# Patient Record
Sex: FEMALE | Race: ASIAN | NOT HISPANIC OR LATINO | URBAN - METROPOLITAN AREA
[De-identification: names, ages, dates, MRNs, and addresses within clinical notes are randomized per-mention and may not be internally consistent; named-entity substitution may affect disease eponyms.]

---

## 2021-01-01 ENCOUNTER — INPATIENT (INPATIENT)
Facility: HOSPITAL | Age: 61
LOS: 13 days | End: 2022-01-08
Attending: INTERNAL MEDICINE | Admitting: STUDENT IN AN ORGANIZED HEALTH CARE EDUCATION/TRAINING PROGRAM
Payer: MEDICAID

## 2021-01-01 VITALS
DIASTOLIC BLOOD PRESSURE: 92 MMHG | SYSTOLIC BLOOD PRESSURE: 141 MMHG | TEMPERATURE: 98 F | HEART RATE: 104 BPM | OXYGEN SATURATION: 93 % | RESPIRATION RATE: 26 BRPM

## 2021-01-01 DIAGNOSIS — J96.01 ACUTE RESPIRATORY FAILURE WITH HYPOXIA: ICD-10-CM

## 2021-01-01 DIAGNOSIS — Z79.899 OTHER LONG TERM (CURRENT) DRUG THERAPY: ICD-10-CM

## 2021-01-01 DIAGNOSIS — E11.9 TYPE 2 DIABETES MELLITUS WITHOUT COMPLICATIONS: ICD-10-CM

## 2021-01-01 DIAGNOSIS — A80.9 ACUTE POLIOMYELITIS, UNSPECIFIED: ICD-10-CM

## 2021-01-01 DIAGNOSIS — U07.1 COVID-19: ICD-10-CM

## 2021-01-01 DIAGNOSIS — E78.5 HYPERLIPIDEMIA, UNSPECIFIED: ICD-10-CM

## 2021-01-01 DIAGNOSIS — R33.9 RETENTION OF URINE, UNSPECIFIED: ICD-10-CM

## 2021-01-01 DIAGNOSIS — I10 ESSENTIAL (PRIMARY) HYPERTENSION: ICD-10-CM

## 2021-01-01 DIAGNOSIS — S73.005A UNSPECIFIED DISLOCATION OF LEFT HIP, INITIAL ENCOUNTER: ICD-10-CM

## 2021-01-01 DIAGNOSIS — R09.89 OTHER SPECIFIED SYMPTOMS AND SIGNS INVOLVING THE CIRCULATORY AND RESPIRATORY SYSTEMS: ICD-10-CM

## 2021-01-01 DIAGNOSIS — E87.0 HYPEROSMOLALITY AND HYPERNATREMIA: ICD-10-CM

## 2021-01-01 DIAGNOSIS — Z29.9 ENCOUNTER FOR PROPHYLACTIC MEASURES, UNSPECIFIED: ICD-10-CM

## 2021-01-01 LAB
A1C WITH ESTIMATED AVERAGE GLUCOSE RESULT: 7.5 % — HIGH (ref 4–5.6)
ALBUMIN SERPL ELPH-MCNC: 2.5 G/DL — LOW (ref 3.3–5)
ALBUMIN SERPL ELPH-MCNC: 2.5 G/DL — LOW (ref 3.3–5)
ALBUMIN SERPL ELPH-MCNC: 3 G/DL — LOW (ref 3.3–5)
ALBUMIN SERPL ELPH-MCNC: 3.4 G/DL — SIGNIFICANT CHANGE UP (ref 3.3–5)
ALBUMIN SERPL ELPH-MCNC: 3.4 G/DL — SIGNIFICANT CHANGE UP (ref 3.3–5)
ALBUMIN SERPL ELPH-MCNC: 3.5 G/DL — SIGNIFICANT CHANGE UP (ref 3.3–5)
ALBUMIN SERPL ELPH-MCNC: 3.6 G/DL — SIGNIFICANT CHANGE UP (ref 3.3–5)
ALBUMIN SERPL ELPH-MCNC: 3.8 G/DL — SIGNIFICANT CHANGE UP (ref 3.3–5)
ALP SERPL-CCNC: 102 U/L — SIGNIFICANT CHANGE UP (ref 40–120)
ALP SERPL-CCNC: 103 U/L — SIGNIFICANT CHANGE UP (ref 40–120)
ALP SERPL-CCNC: 107 U/L — SIGNIFICANT CHANGE UP (ref 40–120)
ALP SERPL-CCNC: 111 U/L — SIGNIFICANT CHANGE UP (ref 40–120)
ALP SERPL-CCNC: 118 U/L — SIGNIFICANT CHANGE UP (ref 40–120)
ALP SERPL-CCNC: 122 U/L — HIGH (ref 40–120)
ALP SERPL-CCNC: 95 U/L — SIGNIFICANT CHANGE UP (ref 40–120)
ALP SERPL-CCNC: 96 U/L — SIGNIFICANT CHANGE UP (ref 40–120)
ALP SERPL-CCNC: 97 U/L — SIGNIFICANT CHANGE UP (ref 40–120)
ALP SERPL-CCNC: 97 U/L — SIGNIFICANT CHANGE UP (ref 40–120)
ALP SERPL-CCNC: 98 U/L — SIGNIFICANT CHANGE UP (ref 40–120)
ALT FLD-CCNC: 33 U/L — SIGNIFICANT CHANGE UP (ref 4–33)
ALT FLD-CCNC: 41 U/L — HIGH (ref 4–33)
ALT FLD-CCNC: 48 U/L — HIGH (ref 4–33)
ALT FLD-CCNC: 53 U/L — HIGH (ref 4–33)
ALT FLD-CCNC: 54 U/L — HIGH (ref 4–33)
ALT FLD-CCNC: 54 U/L — HIGH (ref 4–33)
ALT FLD-CCNC: 55 U/L — HIGH (ref 4–33)
ALT FLD-CCNC: 58 U/L — HIGH (ref 4–33)
ALT FLD-CCNC: 63 U/L — HIGH (ref 4–33)
ALT FLD-CCNC: 68 U/L — HIGH (ref 4–33)
ALT FLD-CCNC: 69 U/L — HIGH (ref 4–33)
ANION GAP SERPL CALC-SCNC: 10 MMOL/L — SIGNIFICANT CHANGE UP (ref 7–14)
ANION GAP SERPL CALC-SCNC: 11 MMOL/L — SIGNIFICANT CHANGE UP (ref 7–14)
ANION GAP SERPL CALC-SCNC: 13 MMOL/L — SIGNIFICANT CHANGE UP (ref 7–14)
ANION GAP SERPL CALC-SCNC: 14 MMOL/L — SIGNIFICANT CHANGE UP (ref 7–14)
ANION GAP SERPL CALC-SCNC: 5 MMOL/L — LOW (ref 7–14)
ANION GAP SERPL CALC-SCNC: 7 MMOL/L — SIGNIFICANT CHANGE UP (ref 7–14)
ANION GAP SERPL CALC-SCNC: 9 MMOL/L — SIGNIFICANT CHANGE UP (ref 7–14)
APPEARANCE UR: CLEAR — SIGNIFICANT CHANGE UP
APTT BLD: 37 SEC — HIGH (ref 27–36.3)
AST SERPL-CCNC: 102 U/L — HIGH (ref 4–32)
AST SERPL-CCNC: 103 U/L — HIGH (ref 4–32)
AST SERPL-CCNC: 118 U/L — HIGH (ref 4–32)
AST SERPL-CCNC: 125 U/L — HIGH (ref 4–32)
AST SERPL-CCNC: 42 U/L — HIGH (ref 4–32)
AST SERPL-CCNC: 53 U/L — HIGH (ref 4–32)
AST SERPL-CCNC: 58 U/L — HIGH (ref 4–32)
AST SERPL-CCNC: 76 U/L — HIGH (ref 4–32)
AST SERPL-CCNC: 82 U/L — HIGH (ref 4–32)
AST SERPL-CCNC: 84 U/L — HIGH (ref 4–32)
AST SERPL-CCNC: 97 U/L — HIGH (ref 4–32)
B PERT DNA SPEC QL NAA+PROBE: SIGNIFICANT CHANGE UP
B PERT+PARAPERT DNA PNL SPEC NAA+PROBE: SIGNIFICANT CHANGE UP
BACTERIA # UR AUTO: ABNORMAL
BACTERIA # UR AUTO: NEGATIVE — SIGNIFICANT CHANGE UP
BACTERIA # UR AUTO: NEGATIVE — SIGNIFICANT CHANGE UP
BASE EXCESS BLDV CALC-SCNC: 3 MMOL/L — SIGNIFICANT CHANGE UP (ref -2–3)
BASE EXCESS BLDV CALC-SCNC: 7.1 MMOL/L — HIGH (ref -2–3)
BASOPHILS # BLD AUTO: 0.02 K/UL — SIGNIFICANT CHANGE UP (ref 0–0.2)
BASOPHILS # BLD AUTO: 0.04 K/UL — SIGNIFICANT CHANGE UP (ref 0–0.2)
BASOPHILS # BLD AUTO: 0.04 K/UL — SIGNIFICANT CHANGE UP (ref 0–0.2)
BASOPHILS # BLD AUTO: 0.05 K/UL — SIGNIFICANT CHANGE UP (ref 0–0.2)
BASOPHILS NFR BLD AUTO: 0.1 % — SIGNIFICANT CHANGE UP (ref 0–2)
BASOPHILS NFR BLD AUTO: 0.1 % — SIGNIFICANT CHANGE UP (ref 0–2)
BASOPHILS NFR BLD AUTO: 0.2 % — SIGNIFICANT CHANGE UP (ref 0–2)
BASOPHILS NFR BLD AUTO: 0.2 % — SIGNIFICANT CHANGE UP (ref 0–2)
BILIRUB DIRECT SERPL-MCNC: 0.2 MG/DL — SIGNIFICANT CHANGE UP (ref 0–0.3)
BILIRUB DIRECT SERPL-MCNC: 0.3 MG/DL — SIGNIFICANT CHANGE UP (ref 0–0.3)
BILIRUB DIRECT SERPL-MCNC: 0.6 MG/DL — HIGH (ref 0–0.3)
BILIRUB DIRECT SERPL-MCNC: <0.2 MG/DL — SIGNIFICANT CHANGE UP (ref 0–0.3)
BILIRUB INDIRECT FLD-MCNC: 0.2 MG/DL — SIGNIFICANT CHANGE UP (ref 0–1)
BILIRUB INDIRECT FLD-MCNC: 0.3 MG/DL — SIGNIFICANT CHANGE UP (ref 0–1)
BILIRUB INDIRECT FLD-MCNC: 0.3 MG/DL — SIGNIFICANT CHANGE UP (ref 0–1)
BILIRUB INDIRECT FLD-MCNC: 0.4 MG/DL — SIGNIFICANT CHANGE UP (ref 0–1)
BILIRUB INDIRECT FLD-MCNC: 0.4 MG/DL — SIGNIFICANT CHANGE UP (ref 0–1)
BILIRUB INDIRECT FLD-MCNC: >0.3 MG/DL — SIGNIFICANT CHANGE UP (ref 0–1)
BILIRUB SERPL-MCNC: 0.4 MG/DL — SIGNIFICANT CHANGE UP (ref 0.2–1.2)
BILIRUB SERPL-MCNC: 0.4 MG/DL — SIGNIFICANT CHANGE UP (ref 0.2–1.2)
BILIRUB SERPL-MCNC: 0.5 MG/DL — SIGNIFICANT CHANGE UP (ref 0.2–1.2)
BILIRUB SERPL-MCNC: 0.5 MG/DL — SIGNIFICANT CHANGE UP (ref 0.2–1.2)
BILIRUB SERPL-MCNC: 0.6 MG/DL — SIGNIFICANT CHANGE UP (ref 0.2–1.2)
BILIRUB SERPL-MCNC: 0.6 MG/DL — SIGNIFICANT CHANGE UP (ref 0.2–1.2)
BILIRUB SERPL-MCNC: 0.7 MG/DL — SIGNIFICANT CHANGE UP (ref 0.2–1.2)
BILIRUB SERPL-MCNC: 0.8 MG/DL — SIGNIFICANT CHANGE UP (ref 0.2–1.2)
BILIRUB SERPL-MCNC: 0.9 MG/DL — SIGNIFICANT CHANGE UP (ref 0.2–1.2)
BILIRUB UR-MCNC: NEGATIVE — SIGNIFICANT CHANGE UP
BLOOD GAS ARTERIAL - LYTES,HGB,ICA,LACT RESULT: SIGNIFICANT CHANGE UP
BLOOD GAS ARTERIAL COMPREHENSIVE RESULT: SIGNIFICANT CHANGE UP
BLOOD GAS VENOUS COMPREHENSIVE RESULT: SIGNIFICANT CHANGE UP
BLOOD GAS VENOUS COMPREHENSIVE RESULT: SIGNIFICANT CHANGE UP
BORDETELLA PARAPERTUSSIS (RAPRVP): SIGNIFICANT CHANGE UP
BUN SERPL-MCNC: 25 MG/DL — HIGH (ref 7–23)
BUN SERPL-MCNC: 25 MG/DL — HIGH (ref 7–23)
BUN SERPL-MCNC: 27 MG/DL — HIGH (ref 7–23)
BUN SERPL-MCNC: 28 MG/DL — HIGH (ref 7–23)
BUN SERPL-MCNC: 31 MG/DL — HIGH (ref 7–23)
BUN SERPL-MCNC: 31 MG/DL — HIGH (ref 7–23)
BUN SERPL-MCNC: 37 MG/DL — HIGH (ref 7–23)
BUN SERPL-MCNC: 39 MG/DL — HIGH (ref 7–23)
BUN SERPL-MCNC: 42 MG/DL — HIGH (ref 7–23)
BUN SERPL-MCNC: 43 MG/DL — HIGH (ref 7–23)
BUN SERPL-MCNC: 44 MG/DL — HIGH (ref 7–23)
BUN SERPL-MCNC: 45 MG/DL — HIGH (ref 7–23)
C PNEUM DNA SPEC QL NAA+PROBE: SIGNIFICANT CHANGE UP
CALCIUM SERPL-MCNC: 7.7 MG/DL — LOW (ref 8.4–10.5)
CALCIUM SERPL-MCNC: 7.9 MG/DL — LOW (ref 8.4–10.5)
CALCIUM SERPL-MCNC: 7.9 MG/DL — LOW (ref 8.4–10.5)
CALCIUM SERPL-MCNC: 8.2 MG/DL — LOW (ref 8.4–10.5)
CALCIUM SERPL-MCNC: 8.2 MG/DL — LOW (ref 8.4–10.5)
CALCIUM SERPL-MCNC: 8.3 MG/DL — LOW (ref 8.4–10.5)
CALCIUM SERPL-MCNC: 8.3 MG/DL — LOW (ref 8.4–10.5)
CALCIUM SERPL-MCNC: 8.5 MG/DL — SIGNIFICANT CHANGE UP (ref 8.4–10.5)
CALCIUM SERPL-MCNC: 8.5 MG/DL — SIGNIFICANT CHANGE UP (ref 8.4–10.5)
CALCIUM SERPL-MCNC: 8.6 MG/DL — SIGNIFICANT CHANGE UP (ref 8.4–10.5)
CALCIUM SERPL-MCNC: 8.7 MG/DL — SIGNIFICANT CHANGE UP (ref 8.4–10.5)
CALCIUM SERPL-MCNC: 8.9 MG/DL — SIGNIFICANT CHANGE UP (ref 8.4–10.5)
CHLORIDE BLDV-SCNC: 115 MMOL/L — HIGH (ref 96–108)
CHLORIDE BLDV-SCNC: 116 MMOL/L — HIGH (ref 96–108)
CHLORIDE SERPL-SCNC: 113 MMOL/L — HIGH (ref 98–107)
CHLORIDE SERPL-SCNC: 113 MMOL/L — HIGH (ref 98–107)
CHLORIDE SERPL-SCNC: 114 MMOL/L — HIGH (ref 98–107)
CHLORIDE SERPL-SCNC: 115 MMOL/L — HIGH (ref 98–107)
CHLORIDE SERPL-SCNC: 116 MMOL/L — HIGH (ref 98–107)
CHLORIDE SERPL-SCNC: 120 MMOL/L — HIGH (ref 98–107)
CHLORIDE SERPL-SCNC: 120 MMOL/L — HIGH (ref 98–107)
CHOLEST SERPL-MCNC: 137 MG/DL — SIGNIFICANT CHANGE UP
CO2 BLDV-SCNC: 30.5 MMOL/L — HIGH (ref 22–26)
CO2 BLDV-SCNC: 35.4 MMOL/L — HIGH (ref 22–26)
CO2 SERPL-SCNC: 24 MMOL/L — SIGNIFICANT CHANGE UP (ref 22–31)
CO2 SERPL-SCNC: 25 MMOL/L — SIGNIFICANT CHANGE UP (ref 22–31)
CO2 SERPL-SCNC: 26 MMOL/L — SIGNIFICANT CHANGE UP (ref 22–31)
CO2 SERPL-SCNC: 27 MMOL/L — SIGNIFICANT CHANGE UP (ref 22–31)
CO2 SERPL-SCNC: 30 MMOL/L — SIGNIFICANT CHANGE UP (ref 22–31)
CO2 SERPL-SCNC: 31 MMOL/L — SIGNIFICANT CHANGE UP (ref 22–31)
COLOR SPEC: SIGNIFICANT CHANGE UP
COLOR SPEC: YELLOW — SIGNIFICANT CHANGE UP
CREAT SERPL-MCNC: 0.65 MG/DL — SIGNIFICANT CHANGE UP (ref 0.5–1.3)
CREAT SERPL-MCNC: 0.65 MG/DL — SIGNIFICANT CHANGE UP (ref 0.5–1.3)
CREAT SERPL-MCNC: 0.67 MG/DL — SIGNIFICANT CHANGE UP (ref 0.5–1.3)
CREAT SERPL-MCNC: 0.67 MG/DL — SIGNIFICANT CHANGE UP (ref 0.5–1.3)
CREAT SERPL-MCNC: 0.68 MG/DL — SIGNIFICANT CHANGE UP (ref 0.5–1.3)
CREAT SERPL-MCNC: 0.68 MG/DL — SIGNIFICANT CHANGE UP (ref 0.5–1.3)
CREAT SERPL-MCNC: 0.69 MG/DL — SIGNIFICANT CHANGE UP (ref 0.5–1.3)
CREAT SERPL-MCNC: 0.69 MG/DL — SIGNIFICANT CHANGE UP (ref 0.5–1.3)
CREAT SERPL-MCNC: 0.72 MG/DL — SIGNIFICANT CHANGE UP (ref 0.5–1.3)
CREAT SERPL-MCNC: 0.72 MG/DL — SIGNIFICANT CHANGE UP (ref 0.5–1.3)
CREAT SERPL-MCNC: 0.75 MG/DL — SIGNIFICANT CHANGE UP (ref 0.5–1.3)
CREAT SERPL-MCNC: 0.75 MG/DL — SIGNIFICANT CHANGE UP (ref 0.5–1.3)
CREAT SERPL-MCNC: 0.79 MG/DL — SIGNIFICANT CHANGE UP (ref 0.5–1.3)
CREAT SERPL-MCNC: 0.79 MG/DL — SIGNIFICANT CHANGE UP (ref 0.5–1.3)
CREAT SERPL-MCNC: 0.8 MG/DL — SIGNIFICANT CHANGE UP (ref 0.5–1.3)
CREAT SERPL-MCNC: 0.84 MG/DL — SIGNIFICANT CHANGE UP (ref 0.5–1.3)
CREAT SERPL-MCNC: 0.87 MG/DL — SIGNIFICANT CHANGE UP (ref 0.5–1.3)
CREAT SERPL-MCNC: 0.87 MG/DL — SIGNIFICANT CHANGE UP (ref 0.5–1.3)
CRP SERPL-MCNC: 119 MG/L — HIGH
CULTURE RESULTS: SIGNIFICANT CHANGE UP
CULTURE RESULTS: SIGNIFICANT CHANGE UP
D DIMER BLD IA.RAPID-MCNC: 618 NG/ML DDU — HIGH
D DIMER BLD IA.RAPID-MCNC: 807 NG/ML DDU — HIGH
DIFF PNL FLD: ABNORMAL
EOSINOPHIL # BLD AUTO: 0 K/UL — SIGNIFICANT CHANGE UP (ref 0–0.5)
EOSINOPHIL NFR BLD AUTO: 0 % — SIGNIFICANT CHANGE UP (ref 0–6)
EPI CELLS # UR: 2 /HPF — SIGNIFICANT CHANGE UP (ref 0–5)
EPI CELLS # UR: 3 /HPF — SIGNIFICANT CHANGE UP (ref 0–5)
EPI CELLS # UR: 4 /HPF — SIGNIFICANT CHANGE UP (ref 0–5)
ESTIMATED AVERAGE GLUCOSE: 169 — SIGNIFICANT CHANGE UP
FERRITIN SERPL-MCNC: 855 NG/ML — HIGH (ref 15–150)
FLUAV SUBTYP SPEC NAA+PROBE: SIGNIFICANT CHANGE UP
FLUBV RNA SPEC QL NAA+PROBE: SIGNIFICANT CHANGE UP
GAS PNL BLDV: 152 MMOL/L — HIGH (ref 136–145)
GAS PNL BLDV: 152 MMOL/L — HIGH (ref 136–145)
GAS PNL BLDV: SIGNIFICANT CHANGE UP
GLUCOSE BLDC GLUCOMTR-MCNC: 149 MG/DL — HIGH (ref 70–99)
GLUCOSE BLDC GLUCOMTR-MCNC: 169 MG/DL — HIGH (ref 70–99)
GLUCOSE BLDC GLUCOMTR-MCNC: 185 MG/DL — HIGH (ref 70–99)
GLUCOSE BLDC GLUCOMTR-MCNC: 191 MG/DL — HIGH (ref 70–99)
GLUCOSE BLDC GLUCOMTR-MCNC: 194 MG/DL — HIGH (ref 70–99)
GLUCOSE BLDC GLUCOMTR-MCNC: 194 MG/DL — HIGH (ref 70–99)
GLUCOSE BLDC GLUCOMTR-MCNC: 198 MG/DL — HIGH (ref 70–99)
GLUCOSE BLDC GLUCOMTR-MCNC: 207 MG/DL — HIGH (ref 70–99)
GLUCOSE BLDC GLUCOMTR-MCNC: 215 MG/DL — HIGH (ref 70–99)
GLUCOSE BLDC GLUCOMTR-MCNC: 223 MG/DL — HIGH (ref 70–99)
GLUCOSE BLDC GLUCOMTR-MCNC: 288 MG/DL — HIGH (ref 70–99)
GLUCOSE BLDC GLUCOMTR-MCNC: 289 MG/DL — HIGH (ref 70–99)
GLUCOSE BLDC GLUCOMTR-MCNC: 303 MG/DL — HIGH (ref 70–99)
GLUCOSE BLDC GLUCOMTR-MCNC: 315 MG/DL — HIGH (ref 70–99)
GLUCOSE BLDC GLUCOMTR-MCNC: 321 MG/DL — HIGH (ref 70–99)
GLUCOSE BLDC GLUCOMTR-MCNC: 326 MG/DL — HIGH (ref 70–99)
GLUCOSE BLDC GLUCOMTR-MCNC: 331 MG/DL — HIGH (ref 70–99)
GLUCOSE BLDC GLUCOMTR-MCNC: 335 MG/DL — HIGH (ref 70–99)
GLUCOSE BLDC GLUCOMTR-MCNC: 337 MG/DL — HIGH (ref 70–99)
GLUCOSE BLDC GLUCOMTR-MCNC: 350 MG/DL — HIGH (ref 70–99)
GLUCOSE BLDC GLUCOMTR-MCNC: 373 MG/DL — HIGH (ref 70–99)
GLUCOSE BLDC GLUCOMTR-MCNC: 379 MG/DL — HIGH (ref 70–99)
GLUCOSE BLDC GLUCOMTR-MCNC: 389 MG/DL — HIGH (ref 70–99)
GLUCOSE BLDC GLUCOMTR-MCNC: 404 MG/DL — HIGH (ref 70–99)
GLUCOSE BLDC GLUCOMTR-MCNC: 406 MG/DL — HIGH (ref 70–99)
GLUCOSE BLDC GLUCOMTR-MCNC: 408 MG/DL — HIGH (ref 70–99)
GLUCOSE BLDC GLUCOMTR-MCNC: 484 MG/DL — CRITICAL HIGH (ref 70–99)
GLUCOSE BLDV-MCNC: 177 MG/DL — HIGH (ref 70–99)
GLUCOSE BLDV-MCNC: 223 MG/DL — HIGH (ref 70–99)
GLUCOSE SERPL-MCNC: 175 MG/DL — HIGH (ref 70–99)
GLUCOSE SERPL-MCNC: 179 MG/DL — HIGH (ref 70–99)
GLUCOSE SERPL-MCNC: 221 MG/DL — HIGH (ref 70–99)
GLUCOSE SERPL-MCNC: 227 MG/DL — HIGH (ref 70–99)
GLUCOSE SERPL-MCNC: 251 MG/DL — HIGH (ref 70–99)
GLUCOSE SERPL-MCNC: 253 MG/DL — HIGH (ref 70–99)
GLUCOSE SERPL-MCNC: 332 MG/DL — HIGH (ref 70–99)
GLUCOSE SERPL-MCNC: 346 MG/DL — HIGH (ref 70–99)
GLUCOSE SERPL-MCNC: 365 MG/DL — HIGH (ref 70–99)
GLUCOSE SERPL-MCNC: 374 MG/DL — HIGH (ref 70–99)
GLUCOSE SERPL-MCNC: 410 MG/DL — HIGH (ref 70–99)
GLUCOSE SERPL-MCNC: 591 MG/DL — CRITICAL HIGH (ref 70–99)
GLUCOSE UR QL: ABNORMAL
GLUCOSE UR QL: NEGATIVE — SIGNIFICANT CHANGE UP
HADV DNA SPEC QL NAA+PROBE: SIGNIFICANT CHANGE UP
HCO3 BLDV-SCNC: 29 MMOL/L — SIGNIFICANT CHANGE UP (ref 22–29)
HCO3 BLDV-SCNC: 34 MMOL/L — HIGH (ref 22–29)
HCOV 229E RNA SPEC QL NAA+PROBE: SIGNIFICANT CHANGE UP
HCOV HKU1 RNA SPEC QL NAA+PROBE: SIGNIFICANT CHANGE UP
HCOV NL63 RNA SPEC QL NAA+PROBE: SIGNIFICANT CHANGE UP
HCOV OC43 RNA SPEC QL NAA+PROBE: SIGNIFICANT CHANGE UP
HCT VFR BLD CALC: 29 % — LOW (ref 34.5–45)
HCT VFR BLD CALC: 30.6 % — LOW (ref 34.5–45)
HCT VFR BLD CALC: 33.1 % — LOW (ref 34.5–45)
HCT VFR BLD CALC: 33.4 % — LOW (ref 34.5–45)
HCT VFR BLD CALC: 37.1 % — SIGNIFICANT CHANGE UP (ref 34.5–45)
HCT VFR BLD CALC: 38.9 % — SIGNIFICANT CHANGE UP (ref 34.5–45)
HCT VFR BLD CALC: 39.5 % — SIGNIFICANT CHANGE UP (ref 34.5–45)
HCT VFR BLD CALC: 39.6 % — SIGNIFICANT CHANGE UP (ref 34.5–45)
HCT VFR BLDA CALC: 35 % — SIGNIFICANT CHANGE UP (ref 34.5–46.5)
HCT VFR BLDA CALC: 39 % — SIGNIFICANT CHANGE UP (ref 34.5–46.5)
HCV AB S/CO SERPL IA: 0.14 S/CO — SIGNIFICANT CHANGE UP (ref 0–0.99)
HCV AB SERPL-IMP: SIGNIFICANT CHANGE UP
HDLC SERPL-MCNC: 42 MG/DL — LOW
HGB BLD CALC-MCNC: 11.5 G/DL — SIGNIFICANT CHANGE UP (ref 11.5–15.5)
HGB BLD CALC-MCNC: 13.1 G/DL — SIGNIFICANT CHANGE UP (ref 11.5–15.5)
HGB BLD-MCNC: 10.4 G/DL — LOW (ref 11.5–15.5)
HGB BLD-MCNC: 10.5 G/DL — LOW (ref 11.5–15.5)
HGB BLD-MCNC: 11.6 G/DL — SIGNIFICANT CHANGE UP (ref 11.5–15.5)
HGB BLD-MCNC: 12.4 G/DL — SIGNIFICANT CHANGE UP (ref 11.5–15.5)
HGB BLD-MCNC: 12.5 G/DL — SIGNIFICANT CHANGE UP (ref 11.5–15.5)
HGB BLD-MCNC: 12.6 G/DL — SIGNIFICANT CHANGE UP (ref 11.5–15.5)
HGB BLD-MCNC: 9.2 G/DL — LOW (ref 11.5–15.5)
HGB BLD-MCNC: 9.9 G/DL — LOW (ref 11.5–15.5)
HMPV RNA SPEC QL NAA+PROBE: SIGNIFICANT CHANGE UP
HPIV1 RNA SPEC QL NAA+PROBE: SIGNIFICANT CHANGE UP
HPIV2 RNA SPEC QL NAA+PROBE: SIGNIFICANT CHANGE UP
HPIV3 RNA SPEC QL NAA+PROBE: SIGNIFICANT CHANGE UP
HPIV4 RNA SPEC QL NAA+PROBE: SIGNIFICANT CHANGE UP
HYALINE CASTS # UR AUTO: 14 /LPF — HIGH (ref 0–7)
HYALINE CASTS # UR AUTO: 2 /LPF — SIGNIFICANT CHANGE UP (ref 0–7)
HYALINE CASTS # UR AUTO: 4 /LPF — SIGNIFICANT CHANGE UP (ref 0–7)
IANC: 19.57 K/UL — HIGH (ref 1.5–8.5)
IANC: 22.07 K/UL — HIGH (ref 1.5–8.5)
IANC: 24.12 K/UL — HIGH (ref 1.5–8.5)
IANC: 26.08 K/UL — HIGH (ref 1.5–8.5)
IMM GRANULOCYTES NFR BLD AUTO: 1 % — SIGNIFICANT CHANGE UP (ref 0–1.5)
IMM GRANULOCYTES NFR BLD AUTO: 1.1 % — SIGNIFICANT CHANGE UP (ref 0–1.5)
IMM GRANULOCYTES NFR BLD AUTO: 1.1 % — SIGNIFICANT CHANGE UP (ref 0–1.5)
IMM GRANULOCYTES NFR BLD AUTO: 1.6 % — HIGH (ref 0–1.5)
INR BLD: 0.99 RATIO — SIGNIFICANT CHANGE UP (ref 0.88–1.16)
INR BLD: 1.04 RATIO — SIGNIFICANT CHANGE UP (ref 0.88–1.16)
INR BLD: 1.25 RATIO — HIGH (ref 0.88–1.16)
INR BLD: 1.28 RATIO — HIGH (ref 0.88–1.16)
INR BLD: 1.34 RATIO — HIGH (ref 0.88–1.16)
KETONES UR-MCNC: NEGATIVE — SIGNIFICANT CHANGE UP
LACTATE BLDV-MCNC: 1.9 MMOL/L — SIGNIFICANT CHANGE UP (ref 0.5–2)
LACTATE BLDV-MCNC: 2.7 MMOL/L — HIGH (ref 0.5–2)
LEGIONELLA AG UR QL: NEGATIVE — SIGNIFICANT CHANGE UP
LEUKOCYTE ESTERASE UR-ACNC: ABNORMAL
LEUKOCYTE ESTERASE UR-ACNC: NEGATIVE — SIGNIFICANT CHANGE UP
LIPID PNL WITH DIRECT LDL SERPL: 66 MG/DL — SIGNIFICANT CHANGE UP
LYMPHOCYTES # BLD AUTO: 0.64 K/UL — LOW (ref 1–3.3)
LYMPHOCYTES # BLD AUTO: 0.75 K/UL — LOW (ref 1–3.3)
LYMPHOCYTES # BLD AUTO: 0.99 K/UL — LOW (ref 1–3.3)
LYMPHOCYTES # BLD AUTO: 1.14 K/UL — SIGNIFICANT CHANGE UP (ref 1–3.3)
LYMPHOCYTES # BLD AUTO: 2.3 % — LOW (ref 13–44)
LYMPHOCYTES # BLD AUTO: 3.5 % — LOW (ref 13–44)
LYMPHOCYTES # BLD AUTO: 4 % — LOW (ref 13–44)
LYMPHOCYTES # BLD AUTO: 4.3 % — LOW (ref 13–44)
M PNEUMO DNA SPEC QL NAA+PROBE: SIGNIFICANT CHANGE UP
MAGNESIUM SERPL-MCNC: 2.4 MG/DL — SIGNIFICANT CHANGE UP (ref 1.6–2.6)
MAGNESIUM SERPL-MCNC: 2.5 MG/DL — SIGNIFICANT CHANGE UP (ref 1.6–2.6)
MAGNESIUM SERPL-MCNC: 2.6 MG/DL — SIGNIFICANT CHANGE UP (ref 1.6–2.6)
MAGNESIUM SERPL-MCNC: 2.7 MG/DL — HIGH (ref 1.6–2.6)
MAGNESIUM SERPL-MCNC: 2.7 MG/DL — HIGH (ref 1.6–2.6)
MAGNESIUM SERPL-MCNC: 2.8 MG/DL — HIGH (ref 1.6–2.6)
MAGNESIUM SERPL-MCNC: 2.8 MG/DL — HIGH (ref 1.6–2.6)
MCHC RBC-ENTMCNC: 27.6 PG — SIGNIFICANT CHANGE UP (ref 27–34)
MCHC RBC-ENTMCNC: 27.8 PG — SIGNIFICANT CHANGE UP (ref 27–34)
MCHC RBC-ENTMCNC: 27.8 PG — SIGNIFICANT CHANGE UP (ref 27–34)
MCHC RBC-ENTMCNC: 28 PG — SIGNIFICANT CHANGE UP (ref 27–34)
MCHC RBC-ENTMCNC: 28 PG — SIGNIFICANT CHANGE UP (ref 27–34)
MCHC RBC-ENTMCNC: 28.1 PG — SIGNIFICANT CHANGE UP (ref 27–34)
MCHC RBC-ENTMCNC: 28.9 PG — SIGNIFICANT CHANGE UP (ref 27–34)
MCHC RBC-ENTMCNC: 29.8 PG — SIGNIFICANT CHANGE UP (ref 27–34)
MCHC RBC-ENTMCNC: 31.3 GM/DL — LOW (ref 32–36)
MCHC RBC-ENTMCNC: 31.4 GM/DL — LOW (ref 32–36)
MCHC RBC-ENTMCNC: 31.7 GM/DL — LOW (ref 32–36)
MCHC RBC-ENTMCNC: 31.8 GM/DL — LOW (ref 32–36)
MCHC RBC-ENTMCNC: 32.1 GM/DL — SIGNIFICANT CHANGE UP (ref 32–36)
MCHC RBC-ENTMCNC: 32.4 GM/DL — SIGNIFICANT CHANGE UP (ref 32–36)
MCV RBC AUTO: 86.4 FL — SIGNIFICANT CHANGE UP (ref 80–100)
MCV RBC AUTO: 87.7 FL — SIGNIFICANT CHANGE UP (ref 80–100)
MCV RBC AUTO: 88.4 FL — SIGNIFICANT CHANGE UP (ref 80–100)
MCV RBC AUTO: 89 FL — SIGNIFICANT CHANGE UP (ref 80–100)
MCV RBC AUTO: 89 FL — SIGNIFICANT CHANGE UP (ref 80–100)
MCV RBC AUTO: 89.2 FL — SIGNIFICANT CHANGE UP (ref 80–100)
MCV RBC AUTO: 89.5 FL — SIGNIFICANT CHANGE UP (ref 80–100)
MCV RBC AUTO: 93.9 FL — SIGNIFICANT CHANGE UP (ref 80–100)
MONOCYTES # BLD AUTO: 0.71 K/UL — SIGNIFICANT CHANGE UP (ref 0–0.9)
MONOCYTES # BLD AUTO: 0.73 K/UL — SIGNIFICANT CHANGE UP (ref 0–0.9)
MONOCYTES # BLD AUTO: 0.98 K/UL — HIGH (ref 0–0.9)
MONOCYTES # BLD AUTO: 1.12 K/UL — HIGH (ref 0–0.9)
MONOCYTES NFR BLD AUTO: 2.5 % — SIGNIFICANT CHANGE UP (ref 2–14)
MONOCYTES NFR BLD AUTO: 3.4 % — SIGNIFICANT CHANGE UP (ref 2–14)
MONOCYTES NFR BLD AUTO: 3.7 % — SIGNIFICANT CHANGE UP (ref 2–14)
MONOCYTES NFR BLD AUTO: 4.6 % — SIGNIFICANT CHANGE UP (ref 2–14)
MRSA PCR RESULT.: SIGNIFICANT CHANGE UP
MRSA PCR RESULT.: SIGNIFICANT CHANGE UP
NEUTROPHILS # BLD AUTO: 19.57 K/UL — HIGH (ref 1.8–7.4)
NEUTROPHILS # BLD AUTO: 22.07 K/UL — HIGH (ref 1.8–7.4)
NEUTROPHILS # BLD AUTO: 24.12 K/UL — HIGH (ref 1.8–7.4)
NEUTROPHILS # BLD AUTO: 26.08 K/UL — HIGH (ref 1.8–7.4)
NEUTROPHILS NFR BLD AUTO: 90.1 % — HIGH (ref 43–77)
NEUTROPHILS NFR BLD AUTO: 90.8 % — HIGH (ref 43–77)
NEUTROPHILS NFR BLD AUTO: 91.9 % — HIGH (ref 43–77)
NEUTROPHILS NFR BLD AUTO: 93.5 % — HIGH (ref 43–77)
NITRITE UR-MCNC: NEGATIVE — SIGNIFICANT CHANGE UP
NON HDL CHOLESTEROL: 95 MG/DL — SIGNIFICANT CHANGE UP
NRBC # BLD: 0 /100 WBCS — SIGNIFICANT CHANGE UP
NRBC # FLD: 0 K/UL — SIGNIFICANT CHANGE UP
NT-PROBNP SERPL-SCNC: 36 PG/ML — SIGNIFICANT CHANGE UP
PCO2 BLDV: 49 MMHG — HIGH (ref 39–42)
PCO2 BLDV: 57 MMHG — HIGH (ref 39–42)
PH BLDV: 7.38 — SIGNIFICANT CHANGE UP (ref 7.32–7.43)
PH BLDV: 7.38 — SIGNIFICANT CHANGE UP (ref 7.32–7.43)
PH UR: 6 — SIGNIFICANT CHANGE UP (ref 5–8)
PH UR: 6.5 — SIGNIFICANT CHANGE UP (ref 5–8)
PHOSPHATE SERPL-MCNC: 1.4 MG/DL — LOW (ref 2.5–4.5)
PHOSPHATE SERPL-MCNC: 2.3 MG/DL — LOW (ref 2.5–4.5)
PHOSPHATE SERPL-MCNC: 2.6 MG/DL — SIGNIFICANT CHANGE UP (ref 2.5–4.5)
PHOSPHATE SERPL-MCNC: 3.3 MG/DL — SIGNIFICANT CHANGE UP (ref 2.5–4.5)
PHOSPHATE SERPL-MCNC: 3.5 MG/DL — SIGNIFICANT CHANGE UP (ref 2.5–4.5)
PHOSPHATE SERPL-MCNC: 3.6 MG/DL — SIGNIFICANT CHANGE UP (ref 2.5–4.5)
PHOSPHATE SERPL-MCNC: 3.8 MG/DL — SIGNIFICANT CHANGE UP (ref 2.5–4.5)
PHOSPHATE SERPL-MCNC: 4 MG/DL — SIGNIFICANT CHANGE UP (ref 2.5–4.5)
PHOSPHATE SERPL-MCNC: 4.3 MG/DL — SIGNIFICANT CHANGE UP (ref 2.5–4.5)
PLATELET # BLD AUTO: 132 K/UL — LOW (ref 150–400)
PLATELET # BLD AUTO: 173 K/UL — SIGNIFICANT CHANGE UP (ref 150–400)
PLATELET # BLD AUTO: 190 K/UL — SIGNIFICANT CHANGE UP (ref 150–400)
PLATELET # BLD AUTO: 212 K/UL — SIGNIFICANT CHANGE UP (ref 150–400)
PLATELET # BLD AUTO: 247 K/UL — SIGNIFICANT CHANGE UP (ref 150–400)
PLATELET # BLD AUTO: 271 K/UL — SIGNIFICANT CHANGE UP (ref 150–400)
PLATELET # BLD AUTO: 303 K/UL — SIGNIFICANT CHANGE UP (ref 150–400)
PLATELET # BLD AUTO: 324 K/UL — SIGNIFICANT CHANGE UP (ref 150–400)
PO2 BLDV: 37 MMHG — SIGNIFICANT CHANGE UP
PO2 BLDV: 47 MMHG — SIGNIFICANT CHANGE UP
POTASSIUM BLDV-SCNC: 4.3 MMOL/L — SIGNIFICANT CHANGE UP (ref 3.5–5.1)
POTASSIUM BLDV-SCNC: 4.4 MMOL/L — SIGNIFICANT CHANGE UP (ref 3.5–5.1)
POTASSIUM SERPL-MCNC: 3.6 MMOL/L — SIGNIFICANT CHANGE UP (ref 3.5–5.3)
POTASSIUM SERPL-MCNC: 3.7 MMOL/L — SIGNIFICANT CHANGE UP (ref 3.5–5.3)
POTASSIUM SERPL-MCNC: 3.7 MMOL/L — SIGNIFICANT CHANGE UP (ref 3.5–5.3)
POTASSIUM SERPL-MCNC: 4 MMOL/L — SIGNIFICANT CHANGE UP (ref 3.5–5.3)
POTASSIUM SERPL-MCNC: 4.1 MMOL/L — SIGNIFICANT CHANGE UP (ref 3.5–5.3)
POTASSIUM SERPL-MCNC: 4.2 MMOL/L — SIGNIFICANT CHANGE UP (ref 3.5–5.3)
POTASSIUM SERPL-MCNC: 4.2 MMOL/L — SIGNIFICANT CHANGE UP (ref 3.5–5.3)
POTASSIUM SERPL-MCNC: 4.5 MMOL/L — SIGNIFICANT CHANGE UP (ref 3.5–5.3)
POTASSIUM SERPL-MCNC: 4.6 MMOL/L — SIGNIFICANT CHANGE UP (ref 3.5–5.3)
POTASSIUM SERPL-MCNC: 4.6 MMOL/L — SIGNIFICANT CHANGE UP (ref 3.5–5.3)
POTASSIUM SERPL-MCNC: 4.9 MMOL/L — SIGNIFICANT CHANGE UP (ref 3.5–5.3)
POTASSIUM SERPL-MCNC: 5.5 MMOL/L — HIGH (ref 3.5–5.3)
POTASSIUM SERPL-SCNC: 3.6 MMOL/L — SIGNIFICANT CHANGE UP (ref 3.5–5.3)
POTASSIUM SERPL-SCNC: 3.7 MMOL/L — SIGNIFICANT CHANGE UP (ref 3.5–5.3)
POTASSIUM SERPL-SCNC: 3.7 MMOL/L — SIGNIFICANT CHANGE UP (ref 3.5–5.3)
POTASSIUM SERPL-SCNC: 4 MMOL/L — SIGNIFICANT CHANGE UP (ref 3.5–5.3)
POTASSIUM SERPL-SCNC: 4.1 MMOL/L — SIGNIFICANT CHANGE UP (ref 3.5–5.3)
POTASSIUM SERPL-SCNC: 4.2 MMOL/L — SIGNIFICANT CHANGE UP (ref 3.5–5.3)
POTASSIUM SERPL-SCNC: 4.2 MMOL/L — SIGNIFICANT CHANGE UP (ref 3.5–5.3)
POTASSIUM SERPL-SCNC: 4.5 MMOL/L — SIGNIFICANT CHANGE UP (ref 3.5–5.3)
POTASSIUM SERPL-SCNC: 4.6 MMOL/L — SIGNIFICANT CHANGE UP (ref 3.5–5.3)
POTASSIUM SERPL-SCNC: 4.6 MMOL/L — SIGNIFICANT CHANGE UP (ref 3.5–5.3)
POTASSIUM SERPL-SCNC: 4.9 MMOL/L — SIGNIFICANT CHANGE UP (ref 3.5–5.3)
POTASSIUM SERPL-SCNC: 5.5 MMOL/L — HIGH (ref 3.5–5.3)
PROCALCITONIN SERPL-MCNC: 0.18 NG/ML — HIGH (ref 0.02–0.1)
PROCALCITONIN SERPL-MCNC: 0.26 NG/ML — HIGH (ref 0.02–0.1)
PROT SERPL-MCNC: 5.5 G/DL — LOW (ref 6–8.3)
PROT SERPL-MCNC: 5.5 G/DL — LOW (ref 6–8.3)
PROT SERPL-MCNC: 5.9 G/DL — LOW (ref 6–8.3)
PROT SERPL-MCNC: 6.1 G/DL — SIGNIFICANT CHANGE UP (ref 6–8.3)
PROT SERPL-MCNC: 6.4 G/DL — SIGNIFICANT CHANGE UP (ref 6–8.3)
PROT SERPL-MCNC: 6.6 G/DL — SIGNIFICANT CHANGE UP (ref 6–8.3)
PROT SERPL-MCNC: 6.7 G/DL — SIGNIFICANT CHANGE UP (ref 6–8.3)
PROT SERPL-MCNC: 6.7 G/DL — SIGNIFICANT CHANGE UP (ref 6–8.3)
PROT SERPL-MCNC: 6.9 G/DL — SIGNIFICANT CHANGE UP (ref 6–8.3)
PROT SERPL-MCNC: 7 G/DL — SIGNIFICANT CHANGE UP (ref 6–8.3)
PROT SERPL-MCNC: 7.3 G/DL — SIGNIFICANT CHANGE UP (ref 6–8.3)
PROT UR-MCNC: ABNORMAL
PROTHROM AB SERPL-ACNC: 11.3 SEC — SIGNIFICANT CHANGE UP (ref 10.6–13.6)
PROTHROM AB SERPL-ACNC: 11.9 SEC — SIGNIFICANT CHANGE UP (ref 10.6–13.6)
PROTHROM AB SERPL-ACNC: 14.2 SEC — HIGH (ref 10.6–13.6)
PROTHROM AB SERPL-ACNC: 14.5 SEC — HIGH (ref 10.6–13.6)
PROTHROM AB SERPL-ACNC: 15.1 SEC — HIGH (ref 10.6–13.6)
RAPID RVP RESULT: DETECTED
RBC # BLD: 3.09 M/UL — LOW (ref 3.8–5.2)
RBC # BLD: 3.42 M/UL — LOW (ref 3.8–5.2)
RBC # BLD: 3.72 M/UL — LOW (ref 3.8–5.2)
RBC # BLD: 3.81 M/UL — SIGNIFICANT CHANGE UP (ref 3.8–5.2)
RBC # BLD: 4.17 M/UL — SIGNIFICANT CHANGE UP (ref 3.8–5.2)
RBC # BLD: 4.43 M/UL — SIGNIFICANT CHANGE UP (ref 3.8–5.2)
RBC # BLD: 4.48 M/UL — SIGNIFICANT CHANGE UP (ref 3.8–5.2)
RBC # BLD: 4.5 M/UL — SIGNIFICANT CHANGE UP (ref 3.8–5.2)
RBC # FLD: 13.1 % — SIGNIFICANT CHANGE UP (ref 10.3–14.5)
RBC # FLD: 13.2 % — SIGNIFICANT CHANGE UP (ref 10.3–14.5)
RBC # FLD: 13.3 % — SIGNIFICANT CHANGE UP (ref 10.3–14.5)
RBC # FLD: 13.5 % — SIGNIFICANT CHANGE UP (ref 10.3–14.5)
RBC CASTS # UR COMP ASSIST: 2 /HPF — SIGNIFICANT CHANGE UP (ref 0–4)
RBC CASTS # UR COMP ASSIST: 3 /HPF — SIGNIFICANT CHANGE UP (ref 0–4)
RBC CASTS # UR COMP ASSIST: 4 /HPF — SIGNIFICANT CHANGE UP (ref 0–4)
RSV RNA SPEC QL NAA+PROBE: SIGNIFICANT CHANGE UP
RV+EV RNA SPEC QL NAA+PROBE: SIGNIFICANT CHANGE UP
S AUREUS DNA NOSE QL NAA+PROBE: DETECTED
S AUREUS DNA NOSE QL NAA+PROBE: DETECTED
SAO2 % BLDV: 59 % — SIGNIFICANT CHANGE UP
SAO2 % BLDV: 74.3 % — SIGNIFICANT CHANGE UP
SARS-COV-2 RNA SPEC QL NAA+PROBE: DETECTED
SODIUM SERPL-SCNC: 149 MMOL/L — HIGH (ref 135–145)
SODIUM SERPL-SCNC: 150 MMOL/L — HIGH (ref 135–145)
SODIUM SERPL-SCNC: 153 MMOL/L — HIGH (ref 135–145)
SODIUM SERPL-SCNC: 154 MMOL/L — HIGH (ref 135–145)
SODIUM SERPL-SCNC: 155 MMOL/L — HIGH (ref 135–145)
SP GR SPEC: 1.02 — SIGNIFICANT CHANGE UP (ref 1–1.05)
SP GR SPEC: 1.05 — SIGNIFICANT CHANGE UP (ref 1–1.05)
SPECIMEN SOURCE: SIGNIFICANT CHANGE UP
SPECIMEN SOURCE: SIGNIFICANT CHANGE UP
TRIGL SERPL-MCNC: 145 MG/DL — SIGNIFICANT CHANGE UP
TROPONIN T, HIGH SENSITIVITY RESULT: 9 NG/L — SIGNIFICANT CHANGE UP
UROBILINOGEN FLD QL: SIGNIFICANT CHANGE UP
WBC # BLD: 21.31 K/UL — HIGH (ref 3.8–10.5)
WBC # BLD: 23.59 K/UL — HIGH (ref 3.8–10.5)
WBC # BLD: 24.49 K/UL — HIGH (ref 3.8–10.5)
WBC # BLD: 25.47 K/UL — HIGH (ref 3.8–10.5)
WBC # BLD: 26.55 K/UL — HIGH (ref 3.8–10.5)
WBC # BLD: 27.93 K/UL — HIGH (ref 3.8–10.5)
WBC # BLD: 6.71 K/UL — SIGNIFICANT CHANGE UP (ref 3.8–10.5)
WBC # BLD: 8 K/UL — SIGNIFICANT CHANGE UP (ref 3.8–10.5)
WBC # FLD AUTO: 21.31 K/UL — HIGH (ref 3.8–10.5)
WBC # FLD AUTO: 23.59 K/UL — HIGH (ref 3.8–10.5)
WBC # FLD AUTO: 24.49 K/UL — HIGH (ref 3.8–10.5)
WBC # FLD AUTO: 25.47 K/UL — HIGH (ref 3.8–10.5)
WBC # FLD AUTO: 26.55 K/UL — HIGH (ref 3.8–10.5)
WBC # FLD AUTO: 27.93 K/UL — HIGH (ref 3.8–10.5)
WBC # FLD AUTO: 6.71 K/UL — SIGNIFICANT CHANGE UP (ref 3.8–10.5)
WBC # FLD AUTO: 8 K/UL — SIGNIFICANT CHANGE UP (ref 3.8–10.5)
WBC UR QL: 1 /HPF — SIGNIFICANT CHANGE UP (ref 0–5)
WBC UR QL: 2 /HPF — SIGNIFICANT CHANGE UP (ref 0–5)
WBC UR QL: 7 /HPF — HIGH (ref 0–5)

## 2021-01-01 PROCEDURE — 73552 X-RAY EXAM OF FEMUR 2/>: CPT | Mod: 26,50

## 2021-01-01 PROCEDURE — 71275 CT ANGIOGRAPHY CHEST: CPT | Mod: 26,MA

## 2021-01-01 PROCEDURE — 99497 ADVNCD CARE PLAN 30 MIN: CPT

## 2021-01-01 PROCEDURE — 99233 SBSQ HOSP IP/OBS HIGH 50: CPT

## 2021-01-01 PROCEDURE — 99255 IP/OBS CONSLTJ NEW/EST HI 80: CPT

## 2021-01-01 PROCEDURE — 99285 EMERGENCY DEPT VISIT HI MDM: CPT | Mod: 25

## 2021-01-01 PROCEDURE — 71045 X-RAY EXAM CHEST 1 VIEW: CPT | Mod: 26

## 2021-01-01 PROCEDURE — 99291 CRITICAL CARE FIRST HOUR: CPT

## 2021-01-01 PROCEDURE — 93970 EXTREMITY STUDY: CPT | Mod: 26

## 2021-01-01 PROCEDURE — 72192 CT PELVIS W/O DYE: CPT | Mod: 26

## 2021-01-01 PROCEDURE — 76770 US EXAM ABDO BACK WALL COMP: CPT | Mod: 26

## 2021-01-01 PROCEDURE — 99358 PROLONG SERVICE W/O CONTACT: CPT

## 2021-01-01 PROCEDURE — 99223 1ST HOSP IP/OBS HIGH 75: CPT

## 2021-01-01 PROCEDURE — 73522 X-RAY EXAM HIPS BI 3-4 VIEWS: CPT | Mod: 26

## 2021-01-01 PROCEDURE — 99221 1ST HOSP IP/OBS SF/LOW 40: CPT

## 2021-01-01 PROCEDURE — 93010 ELECTROCARDIOGRAM REPORT: CPT

## 2021-01-01 RX ORDER — HUMAN INSULIN 100 [IU]/ML
5 INJECTION, SUSPENSION SUBCUTANEOUS ONCE
Refills: 0 | Status: COMPLETED | OUTPATIENT
Start: 2021-01-01 | End: 2021-01-01

## 2021-01-01 RX ORDER — REMDESIVIR 5 MG/ML
200 INJECTION INTRAVENOUS EVERY 24 HOURS
Refills: 0 | Status: COMPLETED | OUTPATIENT
Start: 2021-01-01 | End: 2021-01-01

## 2021-01-01 RX ORDER — NOREPINEPHRINE BITARTRATE/D5W 8 MG/250ML
0.05 PLASTIC BAG, INJECTION (ML) INTRAVENOUS
Qty: 8 | Refills: 0 | Status: DISCONTINUED | OUTPATIENT
Start: 2021-01-01 | End: 2022-01-01

## 2021-01-01 RX ORDER — SODIUM CHLORIDE 9 MG/ML
1000 INJECTION, SOLUTION INTRAVENOUS
Refills: 0 | Status: DISCONTINUED | OUTPATIENT
Start: 2021-01-01 | End: 2021-01-01

## 2021-01-01 RX ORDER — DEXTROSE 50 % IN WATER 50 %
25 SYRINGE (ML) INTRAVENOUS ONCE
Refills: 0 | Status: DISCONTINUED | OUTPATIENT
Start: 2021-01-01 | End: 2022-01-01

## 2021-01-01 RX ORDER — FENTANYL CITRATE 50 UG/ML
0.5 INJECTION INTRAVENOUS
Qty: 5000 | Refills: 0 | Status: DISCONTINUED | OUTPATIENT
Start: 2021-01-01 | End: 2021-01-01

## 2021-01-01 RX ORDER — PROPOFOL 10 MG/ML
30 INJECTION, EMULSION INTRAVENOUS
Qty: 1000 | Refills: 0 | Status: DISCONTINUED | OUTPATIENT
Start: 2021-01-01 | End: 2021-01-01

## 2021-01-01 RX ORDER — SODIUM CHLORIDE 9 MG/ML
1000 INJECTION, SOLUTION INTRAVENOUS
Refills: 0 | Status: DISCONTINUED | OUTPATIENT
Start: 2021-01-01 | End: 2022-01-01

## 2021-01-01 RX ORDER — HUMAN INSULIN 100 [IU]/ML
9 INJECTION, SUSPENSION SUBCUTANEOUS EVERY 6 HOURS
Refills: 0 | Status: DISCONTINUED | OUTPATIENT
Start: 2021-01-01 | End: 2021-01-01

## 2021-01-01 RX ORDER — SENNA PLUS 8.6 MG/1
10 TABLET ORAL DAILY
Refills: 0 | Status: DISCONTINUED | OUTPATIENT
Start: 2021-01-01 | End: 2022-01-01

## 2021-01-01 RX ORDER — POTASSIUM CHLORIDE 20 MEQ
40 PACKET (EA) ORAL ONCE
Refills: 0 | Status: COMPLETED | OUTPATIENT
Start: 2021-01-01 | End: 2021-01-01

## 2021-01-01 RX ORDER — CEFTRIAXONE 500 MG/1
INJECTION, POWDER, FOR SOLUTION INTRAMUSCULAR; INTRAVENOUS
Refills: 0 | Status: DISCONTINUED | OUTPATIENT
Start: 2021-01-01 | End: 2021-01-01

## 2021-01-01 RX ORDER — MUPIROCIN 20 MG/G
1 OINTMENT TOPICAL
Refills: 0 | Status: COMPLETED | OUTPATIENT
Start: 2021-01-01 | End: 2022-01-01

## 2021-01-01 RX ORDER — ENOXAPARIN SODIUM 100 MG/ML
40 INJECTION SUBCUTANEOUS EVERY 24 HOURS
Refills: 0 | Status: DISCONTINUED | OUTPATIENT
Start: 2021-01-01 | End: 2021-01-01

## 2021-01-01 RX ORDER — POTASSIUM PHOSPHATE, MONOBASIC POTASSIUM PHOSPHATE, DIBASIC 236; 224 MG/ML; MG/ML
15 INJECTION, SOLUTION INTRAVENOUS ONCE
Refills: 0 | Status: COMPLETED | OUTPATIENT
Start: 2021-01-01 | End: 2021-01-01

## 2021-01-01 RX ORDER — HUMAN INSULIN 100 [IU]/ML
5 INJECTION, SUSPENSION SUBCUTANEOUS ONCE
Refills: 0 | Status: DISCONTINUED | OUTPATIENT
Start: 2021-01-01 | End: 2021-01-01

## 2021-01-01 RX ORDER — DEXTROSE 50 % IN WATER 50 %
15 SYRINGE (ML) INTRAVENOUS ONCE
Refills: 0 | Status: DISCONTINUED | OUTPATIENT
Start: 2021-01-01 | End: 2022-01-01

## 2021-01-01 RX ORDER — REMDESIVIR 5 MG/ML
INJECTION INTRAVENOUS
Refills: 0 | Status: DISCONTINUED | OUTPATIENT
Start: 2021-01-01 | End: 2021-01-01

## 2021-01-01 RX ORDER — GLUCAGON INJECTION, SOLUTION 0.5 MG/.1ML
1 INJECTION, SOLUTION SUBCUTANEOUS ONCE
Refills: 0 | Status: DISCONTINUED | OUTPATIENT
Start: 2021-01-01 | End: 2022-01-01

## 2021-01-01 RX ORDER — LIDOCAINE 4 G/100G
1 CREAM TOPICAL DAILY
Refills: 0 | Status: DISCONTINUED | OUTPATIENT
Start: 2021-01-01 | End: 2022-01-01

## 2021-01-01 RX ORDER — AZITHROMYCIN 500 MG/1
500 TABLET, FILM COATED ORAL ONCE
Refills: 0 | Status: COMPLETED | OUTPATIENT
Start: 2021-01-01 | End: 2021-01-01

## 2021-01-01 RX ORDER — DEXTROSE 50 % IN WATER 50 %
12.5 SYRINGE (ML) INTRAVENOUS ONCE
Refills: 0 | Status: DISCONTINUED | OUTPATIENT
Start: 2021-01-01 | End: 2021-01-01

## 2021-01-01 RX ORDER — INSULIN LISPRO 100/ML
VIAL (ML) SUBCUTANEOUS EVERY 6 HOURS
Refills: 0 | Status: DISCONTINUED | OUTPATIENT
Start: 2021-01-01 | End: 2022-01-01

## 2021-01-01 RX ORDER — HUMAN INSULIN 100 [IU]/ML
8 INJECTION, SUSPENSION SUBCUTANEOUS ONCE
Refills: 0 | Status: COMPLETED | OUTPATIENT
Start: 2021-01-01 | End: 2021-01-01

## 2021-01-01 RX ORDER — MELOXICAM 15 MG/1
1 TABLET ORAL
Qty: 0 | Refills: 0 | DISCHARGE

## 2021-01-01 RX ORDER — CEFTRIAXONE 500 MG/1
1000 INJECTION, POWDER, FOR SOLUTION INTRAMUSCULAR; INTRAVENOUS EVERY 24 HOURS
Refills: 0 | Status: DISCONTINUED | OUTPATIENT
Start: 2021-01-01 | End: 2021-01-01

## 2021-01-01 RX ORDER — PIPERACILLIN AND TAZOBACTAM 4; .5 G/20ML; G/20ML
3.38 INJECTION, POWDER, LYOPHILIZED, FOR SOLUTION INTRAVENOUS ONCE
Refills: 0 | Status: COMPLETED | OUTPATIENT
Start: 2021-01-01 | End: 2021-01-01

## 2021-01-01 RX ORDER — CHLORHEXIDINE GLUCONATE 213 G/1000ML
1 SOLUTION TOPICAL
Qty: 30 | Refills: 0
Start: 2021-01-01 | End: 2022-01-24

## 2021-01-01 RX ORDER — HUMAN INSULIN 100 [IU]/ML
14 INJECTION, SUSPENSION SUBCUTANEOUS EVERY 6 HOURS
Refills: 0 | Status: DISCONTINUED | OUTPATIENT
Start: 2021-01-01 | End: 2021-01-01

## 2021-01-01 RX ORDER — ACETAMINOPHEN 500 MG
650 TABLET ORAL EVERY 6 HOURS
Refills: 0 | Status: DISCONTINUED | OUTPATIENT
Start: 2021-01-01 | End: 2021-01-01

## 2021-01-01 RX ORDER — REMDESIVIR 5 MG/ML
100 INJECTION INTRAVENOUS EVERY 24 HOURS
Refills: 0 | Status: DISCONTINUED | OUTPATIENT
Start: 2021-01-01 | End: 2021-01-01

## 2021-01-01 RX ORDER — SODIUM,POTASSIUM PHOSPHATES 278-250MG
1 POWDER IN PACKET (EA) ORAL ONCE
Refills: 0 | Status: COMPLETED | OUTPATIENT
Start: 2021-01-01 | End: 2021-01-01

## 2021-01-01 RX ORDER — CHLORHEXIDINE GLUCONATE 213 G/1000ML
1 SOLUTION TOPICAL
Refills: 0 | Status: DISCONTINUED | OUTPATIENT
Start: 2021-01-01 | End: 2022-01-01

## 2021-01-01 RX ORDER — INSULIN LISPRO 100/ML
VIAL (ML) SUBCUTANEOUS EVERY 6 HOURS
Refills: 0 | Status: DISCONTINUED | OUTPATIENT
Start: 2021-01-01 | End: 2021-01-01

## 2021-01-01 RX ORDER — ATORVASTATIN CALCIUM 80 MG/1
1 TABLET, FILM COATED ORAL
Qty: 0 | Refills: 0 | DISCHARGE

## 2021-01-01 RX ORDER — ACETAMINOPHEN 500 MG
650 TABLET ORAL EVERY 4 HOURS
Refills: 0 | Status: DISCONTINUED | OUTPATIENT
Start: 2021-01-01 | End: 2021-01-01

## 2021-01-01 RX ORDER — HUMAN INSULIN 100 [IU]/ML
30 INJECTION, SUSPENSION SUBCUTANEOUS EVERY 6 HOURS
Refills: 0 | Status: DISCONTINUED | OUTPATIENT
Start: 2021-01-01 | End: 2022-01-01

## 2021-01-01 RX ORDER — HUMAN INSULIN 100 [IU]/ML
4 INJECTION, SUSPENSION SUBCUTANEOUS EVERY 8 HOURS
Refills: 0 | Status: DISCONTINUED | OUTPATIENT
Start: 2021-01-01 | End: 2021-01-01

## 2021-01-01 RX ORDER — ENOXAPARIN SODIUM 100 MG/ML
40 INJECTION SUBCUTANEOUS EVERY 12 HOURS
Refills: 0 | Status: DISCONTINUED | OUTPATIENT
Start: 2021-01-01 | End: 2021-01-01

## 2021-01-01 RX ORDER — CEFTRIAXONE 500 MG/1
1000 INJECTION, POWDER, FOR SOLUTION INTRAMUSCULAR; INTRAVENOUS ONCE
Refills: 0 | Status: COMPLETED | OUTPATIENT
Start: 2021-01-01 | End: 2021-01-01

## 2021-01-01 RX ORDER — DEXAMETHASONE 0.5 MG/5ML
6 ELIXIR ORAL DAILY
Refills: 0 | Status: COMPLETED | OUTPATIENT
Start: 2021-01-01 | End: 2022-01-01

## 2021-01-01 RX ORDER — AZITHROMYCIN 500 MG/1
500 TABLET, FILM COATED ORAL EVERY 24 HOURS
Refills: 0 | Status: DISCONTINUED | OUTPATIENT
Start: 2021-01-01 | End: 2021-01-01

## 2021-01-01 RX ORDER — ENOXAPARIN SODIUM 100 MG/ML
40 INJECTION SUBCUTANEOUS DAILY
Refills: 0 | Status: DISCONTINUED | OUTPATIENT
Start: 2021-01-01 | End: 2021-01-01

## 2021-01-01 RX ORDER — FENTANYL CITRATE 50 UG/ML
2 INJECTION INTRAVENOUS
Qty: 2500 | Refills: 0 | Status: DISCONTINUED | OUTPATIENT
Start: 2021-01-01 | End: 2022-01-01

## 2021-01-01 RX ORDER — ENOXAPARIN SODIUM 100 MG/ML
40 INJECTION SUBCUTANEOUS EVERY 24 HOURS
Refills: 0 | Status: DISCONTINUED | OUTPATIENT
Start: 2021-01-01 | End: 2022-01-01

## 2021-01-01 RX ORDER — DEXTROSE 50 % IN WATER 50 %
15 SYRINGE (ML) INTRAVENOUS ONCE
Refills: 0 | Status: DISCONTINUED | OUTPATIENT
Start: 2021-01-01 | End: 2021-01-01

## 2021-01-01 RX ORDER — ALBUTEROL 90 UG/1
2 AEROSOL, METERED ORAL
Qty: 0 | Refills: 0 | DISCHARGE

## 2021-01-01 RX ORDER — ALBUTEROL 90 UG/1
2 AEROSOL, METERED ORAL EVERY 4 HOURS
Refills: 0 | Status: DISCONTINUED | OUTPATIENT
Start: 2021-01-01 | End: 2022-01-01

## 2021-01-01 RX ORDER — METFORMIN HYDROCHLORIDE 850 MG/1
1 TABLET ORAL
Qty: 0 | Refills: 0 | DISCHARGE

## 2021-01-01 RX ORDER — ALBUMIN HUMAN 25 %
250 VIAL (ML) INTRAVENOUS ONCE
Refills: 0 | Status: COMPLETED | OUTPATIENT
Start: 2021-01-01 | End: 2021-01-01

## 2021-01-01 RX ORDER — PROPOFOL 10 MG/ML
30 INJECTION, EMULSION INTRAVENOUS
Qty: 1000 | Refills: 0 | Status: DISCONTINUED | OUTPATIENT
Start: 2021-01-01 | End: 2022-01-01

## 2021-01-01 RX ORDER — MIDAZOLAM HYDROCHLORIDE 1 MG/ML
0.02 INJECTION, SOLUTION INTRAMUSCULAR; INTRAVENOUS
Qty: 100 | Refills: 0 | Status: DISCONTINUED | OUTPATIENT
Start: 2021-01-01 | End: 2022-01-01

## 2021-01-01 RX ORDER — AZITHROMYCIN 500 MG/1
TABLET, FILM COATED ORAL
Refills: 0 | Status: DISCONTINUED | OUTPATIENT
Start: 2021-01-01 | End: 2021-01-01

## 2021-01-01 RX ORDER — ONDANSETRON 8 MG/1
4 TABLET, FILM COATED ORAL EVERY 8 HOURS
Refills: 0 | Status: DISCONTINUED | OUTPATIENT
Start: 2021-01-01 | End: 2021-01-01

## 2021-01-01 RX ORDER — PIPERACILLIN AND TAZOBACTAM 4; .5 G/20ML; G/20ML
3.38 INJECTION, POWDER, LYOPHILIZED, FOR SOLUTION INTRAVENOUS EVERY 8 HOURS
Refills: 0 | Status: DISCONTINUED | OUTPATIENT
Start: 2021-01-01 | End: 2022-01-01

## 2021-01-01 RX ORDER — DEXTROSE 50 % IN WATER 50 %
25 SYRINGE (ML) INTRAVENOUS ONCE
Refills: 0 | Status: DISCONTINUED | OUTPATIENT
Start: 2021-01-01 | End: 2021-01-01

## 2021-01-01 RX ORDER — HUMAN INSULIN 100 [IU]/ML
4 INJECTION, SUSPENSION SUBCUTANEOUS EVERY 6 HOURS
Refills: 0 | Status: DISCONTINUED | OUTPATIENT
Start: 2021-01-01 | End: 2021-01-01

## 2021-01-01 RX ORDER — ACETAMINOPHEN 500 MG
650 TABLET ORAL EVERY 6 HOURS
Refills: 0 | Status: DISCONTINUED | OUTPATIENT
Start: 2021-01-01 | End: 2022-01-01

## 2021-01-01 RX ORDER — DEXTROSE 50 % IN WATER 50 %
12.5 SYRINGE (ML) INTRAVENOUS ONCE
Refills: 0 | Status: DISCONTINUED | OUTPATIENT
Start: 2021-01-01 | End: 2022-01-01

## 2021-01-01 RX ORDER — CHLORHEXIDINE GLUCONATE 213 G/1000ML
15 SOLUTION TOPICAL EVERY 12 HOURS
Refills: 0 | Status: DISCONTINUED | OUTPATIENT
Start: 2021-01-01 | End: 2022-01-01

## 2021-01-01 RX ORDER — HUMAN INSULIN 100 [IU]/ML
22 INJECTION, SUSPENSION SUBCUTANEOUS EVERY 6 HOURS
Refills: 0 | Status: DISCONTINUED | OUTPATIENT
Start: 2021-01-01 | End: 2021-01-01

## 2021-01-01 RX ORDER — DEXAMETHASONE 0.5 MG/5ML
6 ELIXIR ORAL ONCE
Refills: 0 | Status: COMPLETED | OUTPATIENT
Start: 2021-01-01 | End: 2021-01-01

## 2021-01-01 RX ORDER — CHLORHEXIDINE GLUCONATE 213 G/1000ML
1 SOLUTION TOPICAL DAILY
Refills: 0 | Status: DISCONTINUED | OUTPATIENT
Start: 2021-01-01 | End: 2022-01-01

## 2021-01-01 RX ORDER — POLYETHYLENE GLYCOL 3350 17 G/17G
17 POWDER, FOR SOLUTION ORAL DAILY
Refills: 0 | Status: DISCONTINUED | OUTPATIENT
Start: 2021-01-01 | End: 2022-01-01

## 2021-01-01 RX ADMIN — POTASSIUM PHOSPHATE, MONOBASIC POTASSIUM PHOSPHATE, DIBASIC 62.5 MILLIMOLE(S): 236; 224 INJECTION, SOLUTION INTRAVENOUS at 18:10

## 2021-01-01 RX ADMIN — Medication 3.92 MICROGRAM(S)/KG/MIN: at 20:03

## 2021-01-01 RX ADMIN — FENTANYL CITRATE 8.36 MICROGRAM(S)/KG/HR: 50 INJECTION INTRAVENOUS at 08:05

## 2021-01-01 RX ADMIN — MIDAZOLAM HYDROCHLORIDE 0.84 MG/KG/HR: 1 INJECTION, SOLUTION INTRAMUSCULAR; INTRAVENOUS at 07:19

## 2021-01-01 RX ADMIN — Medication 1 APPLICATION(S): at 18:25

## 2021-01-01 RX ADMIN — Medication 12: at 17:58

## 2021-01-01 RX ADMIN — Medication 1 APPLICATION(S): at 17:59

## 2021-01-01 RX ADMIN — LIDOCAINE 1 PATCH: 4 CREAM TOPICAL at 23:24

## 2021-01-01 RX ADMIN — CHLORHEXIDINE GLUCONATE 1 APPLICATION(S): 213 SOLUTION TOPICAL at 11:07

## 2021-01-01 RX ADMIN — REMDESIVIR 500 MILLIGRAM(S): 5 INJECTION INTRAVENOUS at 11:08

## 2021-01-01 RX ADMIN — MIDAZOLAM HYDROCHLORIDE 0.84 MG/KG/HR: 1 INJECTION, SOLUTION INTRAMUSCULAR; INTRAVENOUS at 20:03

## 2021-01-01 RX ADMIN — HUMAN INSULIN 8 UNIT(S): 100 INJECTION, SUSPENSION SUBCUTANEOUS at 17:46

## 2021-01-01 RX ADMIN — MIDAZOLAM HYDROCHLORIDE 0.84 MG/KG/HR: 1 INJECTION, SOLUTION INTRAMUSCULAR; INTRAVENOUS at 11:33

## 2021-01-01 RX ADMIN — AZITHROMYCIN 255 MILLIGRAM(S): 500 TABLET, FILM COATED ORAL at 14:36

## 2021-01-01 RX ADMIN — MIDAZOLAM HYDROCHLORIDE 0.84 MG/KG/HR: 1 INJECTION, SOLUTION INTRAMUSCULAR; INTRAVENOUS at 08:31

## 2021-01-01 RX ADMIN — FENTANYL CITRATE 8.36 MICROGRAM(S)/KG/HR: 50 INJECTION INTRAVENOUS at 21:05

## 2021-01-01 RX ADMIN — Medication 6 MILLIGRAM(S): at 06:05

## 2021-01-01 RX ADMIN — LIDOCAINE 1 PATCH: 4 CREAM TOPICAL at 00:53

## 2021-01-01 RX ADMIN — Medication 500 MILLILITER(S): at 22:14

## 2021-01-01 RX ADMIN — HUMAN INSULIN 22 UNIT(S): 100 INJECTION, SUSPENSION SUBCUTANEOUS at 06:55

## 2021-01-01 RX ADMIN — ENOXAPARIN SODIUM 40 MILLIGRAM(S): 100 INJECTION SUBCUTANEOUS at 17:22

## 2021-01-01 RX ADMIN — LIDOCAINE 1 PATCH: 4 CREAM TOPICAL at 11:09

## 2021-01-01 RX ADMIN — MIDAZOLAM HYDROCHLORIDE 0.84 MG/KG/HR: 1 INJECTION, SOLUTION INTRAMUSCULAR; INTRAVENOUS at 20:48

## 2021-01-01 RX ADMIN — CHLORHEXIDINE GLUCONATE 1 APPLICATION(S): 213 SOLUTION TOPICAL at 11:56

## 2021-01-01 RX ADMIN — Medication 1 PACKET(S): at 14:51

## 2021-01-01 RX ADMIN — Medication 6 MILLIGRAM(S): at 05:09

## 2021-01-01 RX ADMIN — ENOXAPARIN SODIUM 40 MILLIGRAM(S): 100 INJECTION SUBCUTANEOUS at 18:08

## 2021-01-01 RX ADMIN — FENTANYL CITRATE 2.09 MICROGRAM(S)/KG/HR: 50 INJECTION INTRAVENOUS at 01:38

## 2021-01-01 RX ADMIN — FENTANYL CITRATE 8.36 MICROGRAM(S)/KG/HR: 50 INJECTION INTRAVENOUS at 20:47

## 2021-01-01 RX ADMIN — MIDAZOLAM HYDROCHLORIDE 0.84 MG/KG/HR: 1 INJECTION, SOLUTION INTRAMUSCULAR; INTRAVENOUS at 23:31

## 2021-01-01 RX ADMIN — Medication 8: at 06:55

## 2021-01-01 RX ADMIN — HUMAN INSULIN 30 UNIT(S): 100 INJECTION, SUSPENSION SUBCUTANEOUS at 17:35

## 2021-01-01 RX ADMIN — ENOXAPARIN SODIUM 40 MILLIGRAM(S): 100 INJECTION SUBCUTANEOUS at 17:10

## 2021-01-01 RX ADMIN — CHLORHEXIDINE GLUCONATE 15 MILLILITER(S): 213 SOLUTION TOPICAL at 17:13

## 2021-01-01 RX ADMIN — Medication 3.92 MICROGRAM(S)/KG/MIN: at 20:47

## 2021-01-01 RX ADMIN — MUPIROCIN 1 APPLICATION(S): 20 OINTMENT TOPICAL at 17:58

## 2021-01-01 RX ADMIN — HUMAN INSULIN 9 UNIT(S): 100 INJECTION, SUSPENSION SUBCUTANEOUS at 05:13

## 2021-01-01 RX ADMIN — LIDOCAINE 1 PATCH: 4 CREAM TOPICAL at 19:40

## 2021-01-01 RX ADMIN — Medication 4: at 12:58

## 2021-01-01 RX ADMIN — SODIUM CHLORIDE 100 MILLILITER(S): 9 INJECTION, SOLUTION INTRAVENOUS at 16:13

## 2021-01-01 RX ADMIN — Medication 3.92 MICROGRAM(S)/KG/MIN: at 08:04

## 2021-01-01 RX ADMIN — CEFTRIAXONE 100 MILLIGRAM(S): 500 INJECTION, POWDER, FOR SOLUTION INTRAMUSCULAR; INTRAVENOUS at 12:54

## 2021-01-01 RX ADMIN — AZITHROMYCIN 255 MILLIGRAM(S): 500 TABLET, FILM COATED ORAL at 11:33

## 2021-01-01 RX ADMIN — MUPIROCIN 1 APPLICATION(S): 20 OINTMENT TOPICAL at 06:59

## 2021-01-01 RX ADMIN — ENOXAPARIN SODIUM 40 MILLIGRAM(S): 100 INJECTION SUBCUTANEOUS at 05:36

## 2021-01-01 RX ADMIN — CHLORHEXIDINE GLUCONATE 1 APPLICATION(S): 213 SOLUTION TOPICAL at 06:06

## 2021-01-01 RX ADMIN — CHLORHEXIDINE GLUCONATE 15 MILLILITER(S): 213 SOLUTION TOPICAL at 17:09

## 2021-01-01 RX ADMIN — SENNA PLUS 10 MILLILITER(S): 8.6 TABLET ORAL at 11:23

## 2021-01-01 RX ADMIN — ENOXAPARIN SODIUM 40 MILLIGRAM(S): 100 INJECTION SUBCUTANEOUS at 18:10

## 2021-01-01 RX ADMIN — SODIUM CHLORIDE 100 MILLILITER(S): 9 INJECTION, SOLUTION INTRAVENOUS at 22:15

## 2021-01-01 RX ADMIN — ENOXAPARIN SODIUM 40 MILLIGRAM(S): 100 INJECTION SUBCUTANEOUS at 17:34

## 2021-01-01 RX ADMIN — HUMAN INSULIN 4 UNIT(S): 100 INJECTION, SUSPENSION SUBCUTANEOUS at 11:29

## 2021-01-01 RX ADMIN — POLYETHYLENE GLYCOL 3350 17 GRAM(S): 17 POWDER, FOR SOLUTION ORAL at 11:57

## 2021-01-01 RX ADMIN — FENTANYL CITRATE 8.36 MICROGRAM(S)/KG/HR: 50 INJECTION INTRAVENOUS at 11:33

## 2021-01-01 RX ADMIN — Medication 3.92 MICROGRAM(S)/KG/MIN: at 23:31

## 2021-01-01 RX ADMIN — Medication 650 MILLIGRAM(S): at 10:03

## 2021-01-01 RX ADMIN — MIDAZOLAM HYDROCHLORIDE 0.84 MG/KG/HR: 1 INJECTION, SOLUTION INTRAMUSCULAR; INTRAVENOUS at 21:05

## 2021-01-01 RX ADMIN — SODIUM CHLORIDE 75 MILLILITER(S): 9 INJECTION, SOLUTION INTRAVENOUS at 19:08

## 2021-01-01 RX ADMIN — Medication 3.92 MICROGRAM(S)/KG/MIN: at 11:33

## 2021-01-01 RX ADMIN — Medication 2: at 12:56

## 2021-01-01 RX ADMIN — CHLORHEXIDINE GLUCONATE 15 MILLILITER(S): 213 SOLUTION TOPICAL at 06:08

## 2021-01-01 RX ADMIN — MUPIROCIN 1 APPLICATION(S): 20 OINTMENT TOPICAL at 05:16

## 2021-01-01 RX ADMIN — FENTANYL CITRATE 8.36 MICROGRAM(S)/KG/HR: 50 INJECTION INTRAVENOUS at 07:19

## 2021-01-01 RX ADMIN — Medication 650 MILLIGRAM(S): at 11:00

## 2021-01-01 RX ADMIN — CHLORHEXIDINE GLUCONATE 15 MILLILITER(S): 213 SOLUTION TOPICAL at 06:58

## 2021-01-01 RX ADMIN — MUPIROCIN 1 APPLICATION(S): 20 OINTMENT TOPICAL at 17:34

## 2021-01-01 RX ADMIN — HUMAN INSULIN 14 UNIT(S): 100 INJECTION, SUSPENSION SUBCUTANEOUS at 11:55

## 2021-01-01 RX ADMIN — Medication 1 APPLICATION(S): at 17:58

## 2021-01-01 RX ADMIN — HUMAN INSULIN 5 UNIT(S): 100 INJECTION, SUSPENSION SUBCUTANEOUS at 18:00

## 2021-01-01 RX ADMIN — Medication 6 MILLIGRAM(S): at 05:55

## 2021-01-01 RX ADMIN — Medication 6 MILLIGRAM(S): at 05:36

## 2021-01-01 RX ADMIN — PROPOFOL 7.52 MICROGRAM(S)/KG/MIN: 10 INJECTION, EMULSION INTRAVENOUS at 20:47

## 2021-01-01 RX ADMIN — Medication 650 MILLIGRAM(S): at 00:00

## 2021-01-01 RX ADMIN — PIPERACILLIN AND TAZOBACTAM 25 GRAM(S): 4; .5 INJECTION, POWDER, LYOPHILIZED, FOR SOLUTION INTRAVENOUS at 10:15

## 2021-01-01 RX ADMIN — CHLORHEXIDINE GLUCONATE 1 APPLICATION(S): 213 SOLUTION TOPICAL at 11:09

## 2021-01-01 RX ADMIN — PIPERACILLIN AND TAZOBACTAM 25 GRAM(S): 4; .5 INJECTION, POWDER, LYOPHILIZED, FOR SOLUTION INTRAVENOUS at 02:14

## 2021-01-01 RX ADMIN — FENTANYL CITRATE 8.36 MICROGRAM(S)/KG/HR: 50 INJECTION INTRAVENOUS at 23:31

## 2021-01-01 RX ADMIN — MUPIROCIN 1 APPLICATION(S): 20 OINTMENT TOPICAL at 17:12

## 2021-01-01 RX ADMIN — FENTANYL CITRATE 2.09 MICROGRAM(S)/KG/HR: 50 INJECTION INTRAVENOUS at 08:32

## 2021-01-01 RX ADMIN — HUMAN INSULIN 4 UNIT(S): 100 INJECTION, SUSPENSION SUBCUTANEOUS at 17:59

## 2021-01-01 RX ADMIN — CEFTRIAXONE 100 MILLIGRAM(S): 500 INJECTION, POWDER, FOR SOLUTION INTRAMUSCULAR; INTRAVENOUS at 17:32

## 2021-01-01 RX ADMIN — LIDOCAINE 1 PATCH: 4 CREAM TOPICAL at 23:16

## 2021-01-01 RX ADMIN — Medication 1: at 17:22

## 2021-01-01 RX ADMIN — Medication 8: at 05:12

## 2021-01-01 RX ADMIN — Medication 12: at 17:14

## 2021-01-01 RX ADMIN — HUMAN INSULIN 14 UNIT(S): 100 INJECTION, SUSPENSION SUBCUTANEOUS at 17:13

## 2021-01-01 RX ADMIN — Medication 6: at 11:24

## 2021-01-01 RX ADMIN — SODIUM CHLORIDE 75 MILLILITER(S): 9 INJECTION, SOLUTION INTRAVENOUS at 01:43

## 2021-01-01 RX ADMIN — Medication 1: at 06:16

## 2021-01-01 RX ADMIN — LIDOCAINE 1 PATCH: 4 CREAM TOPICAL at 11:30

## 2021-01-01 RX ADMIN — CHLORHEXIDINE GLUCONATE 1 APPLICATION(S): 213 SOLUTION TOPICAL at 08:35

## 2021-01-01 RX ADMIN — Medication 10: at 12:30

## 2021-01-01 RX ADMIN — REMDESIVIR 500 MILLIGRAM(S): 5 INJECTION INTRAVENOUS at 11:07

## 2021-01-01 RX ADMIN — Medication 10: at 23:42

## 2021-01-01 RX ADMIN — CEFTRIAXONE 100 MILLIGRAM(S): 500 INJECTION, POWDER, FOR SOLUTION INTRAMUSCULAR; INTRAVENOUS at 14:36

## 2021-01-01 RX ADMIN — HUMAN INSULIN 22 UNIT(S): 100 INJECTION, SUSPENSION SUBCUTANEOUS at 12:30

## 2021-01-01 RX ADMIN — Medication 10: at 11:55

## 2021-01-01 RX ADMIN — PROPOFOL 7.52 MICROGRAM(S)/KG/MIN: 10 INJECTION, EMULSION INTRAVENOUS at 11:57

## 2021-01-01 RX ADMIN — Medication 1 APPLICATION(S): at 06:59

## 2021-01-01 RX ADMIN — CHLORHEXIDINE GLUCONATE 15 MILLILITER(S): 213 SOLUTION TOPICAL at 06:05

## 2021-01-01 RX ADMIN — FENTANYL CITRATE 8.36 MICROGRAM(S)/KG/HR: 50 INJECTION INTRAVENOUS at 20:03

## 2021-01-01 RX ADMIN — POLYETHYLENE GLYCOL 3350 17 GRAM(S): 17 POWDER, FOR SOLUTION ORAL at 11:23

## 2021-01-01 RX ADMIN — Medication 3.92 MICROGRAM(S)/KG/MIN: at 07:19

## 2021-01-01 RX ADMIN — Medication 6 MILLIGRAM(S): at 06:59

## 2021-01-01 RX ADMIN — Medication 650 MILLIGRAM(S): at 13:25

## 2021-01-01 RX ADMIN — Medication 1: at 01:37

## 2021-01-01 RX ADMIN — LIDOCAINE 1 PATCH: 4 CREAM TOPICAL at 21:30

## 2021-01-01 RX ADMIN — CHLORHEXIDINE GLUCONATE 1 APPLICATION(S): 213 SOLUTION TOPICAL at 12:42

## 2021-01-01 RX ADMIN — PROPOFOL 7.52 MICROGRAM(S)/KG/MIN: 10 INJECTION, EMULSION INTRAVENOUS at 08:32

## 2021-01-01 RX ADMIN — Medication 1 APPLICATION(S): at 17:12

## 2021-01-01 RX ADMIN — SENNA PLUS 10 MILLILITER(S): 8.6 TABLET ORAL at 11:56

## 2021-01-01 RX ADMIN — SODIUM CHLORIDE 100 MILLILITER(S): 9 INJECTION, SOLUTION INTRAVENOUS at 08:49

## 2021-01-01 RX ADMIN — Medication 1: at 23:52

## 2021-01-01 RX ADMIN — PIPERACILLIN AND TAZOBACTAM 25 GRAM(S): 4; .5 INJECTION, POWDER, LYOPHILIZED, FOR SOLUTION INTRAVENOUS at 17:34

## 2021-01-01 RX ADMIN — HUMAN INSULIN 4 UNIT(S): 100 INJECTION, SUSPENSION SUBCUTANEOUS at 07:37

## 2021-01-01 RX ADMIN — LIDOCAINE 1 PATCH: 4 CREAM TOPICAL at 23:36

## 2021-01-01 RX ADMIN — HUMAN INSULIN 22 UNIT(S): 100 INJECTION, SUSPENSION SUBCUTANEOUS at 23:28

## 2021-01-01 RX ADMIN — Medication 8: at 17:36

## 2021-01-01 RX ADMIN — Medication 1 APPLICATION(S): at 06:37

## 2021-01-01 RX ADMIN — LIDOCAINE 1 PATCH: 4 CREAM TOPICAL at 11:15

## 2021-01-01 RX ADMIN — Medication 12: at 11:29

## 2021-01-01 RX ADMIN — CHLORHEXIDINE GLUCONATE 1 APPLICATION(S): 213 SOLUTION TOPICAL at 06:37

## 2021-01-01 RX ADMIN — Medication 1 APPLICATION(S): at 05:16

## 2021-01-01 RX ADMIN — Medication 1 TABLET(S): at 12:22

## 2021-01-01 RX ADMIN — MUPIROCIN 1 APPLICATION(S): 20 OINTMENT TOPICAL at 06:06

## 2021-01-01 RX ADMIN — SODIUM CHLORIDE 100 MILLILITER(S): 9 INJECTION, SOLUTION INTRAVENOUS at 06:16

## 2021-01-01 RX ADMIN — MIDAZOLAM HYDROCHLORIDE 0.84 MG/KG/HR: 1 INJECTION, SOLUTION INTRAMUSCULAR; INTRAVENOUS at 08:04

## 2021-01-01 RX ADMIN — CHLORHEXIDINE GLUCONATE 15 MILLILITER(S): 213 SOLUTION TOPICAL at 17:34

## 2021-01-01 RX ADMIN — Medication 40 MILLIEQUIVALENT(S): at 17:10

## 2021-01-01 RX ADMIN — Medication 6 MILLIGRAM(S): at 19:08

## 2021-01-01 RX ADMIN — CHLORHEXIDINE GLUCONATE 1 APPLICATION(S): 213 SOLUTION TOPICAL at 12:00

## 2021-01-01 RX ADMIN — Medication 650 MILLIGRAM(S): at 11:56

## 2021-01-01 RX ADMIN — Medication 3.92 MICROGRAM(S)/KG/MIN: at 21:05

## 2021-01-01 RX ADMIN — MUPIROCIN 1 APPLICATION(S): 20 OINTMENT TOPICAL at 17:09

## 2021-01-01 RX ADMIN — PROPOFOL 7.52 MICROGRAM(S)/KG/MIN: 10 INJECTION, EMULSION INTRAVENOUS at 08:35

## 2021-01-01 RX ADMIN — Medication 3: at 00:29

## 2021-01-01 RX ADMIN — CHLORHEXIDINE GLUCONATE 15 MILLILITER(S): 213 SOLUTION TOPICAL at 18:05

## 2021-01-01 RX ADMIN — Medication 4: at 06:43

## 2021-01-01 RX ADMIN — PIPERACILLIN AND TAZOBACTAM 200 GRAM(S): 4; .5 INJECTION, POWDER, LYOPHILIZED, FOR SOLUTION INTRAVENOUS at 10:03

## 2021-01-01 RX ADMIN — Medication 1 TABLET(S): at 12:32

## 2021-01-01 RX ADMIN — PROPOFOL 7.52 MICROGRAM(S)/KG/MIN: 10 INJECTION, EMULSION INTRAVENOUS at 21:05

## 2021-01-01 RX ADMIN — Medication 4: at 17:23

## 2021-01-01 RX ADMIN — MUPIROCIN 1 APPLICATION(S): 20 OINTMENT TOPICAL at 06:08

## 2021-01-01 RX ADMIN — Medication 6 MILLIGRAM(S): at 06:36

## 2021-01-01 RX ADMIN — Medication 4: at 06:09

## 2021-01-01 RX ADMIN — PIPERACILLIN AND TAZOBACTAM 25 GRAM(S): 4; .5 INJECTION, POWDER, LYOPHILIZED, FOR SOLUTION INTRAVENOUS at 17:12

## 2021-01-01 RX ADMIN — CHLORHEXIDINE GLUCONATE 15 MILLILITER(S): 213 SOLUTION TOPICAL at 05:54

## 2021-01-01 RX ADMIN — Medication 2: at 18:07

## 2021-01-01 RX ADMIN — Medication 12: at 23:28

## 2021-01-01 RX ADMIN — HUMAN INSULIN 9 UNIT(S): 100 INJECTION, SUSPENSION SUBCUTANEOUS at 23:42

## 2021-01-01 RX ADMIN — Medication 3.92 MICROGRAM(S)/KG/MIN: at 08:31

## 2021-01-01 RX ADMIN — Medication 2: at 06:35

## 2021-01-01 RX ADMIN — Medication 650 MILLIGRAM(S): at 00:17

## 2021-01-01 RX ADMIN — FENTANYL CITRATE 8.36 MICROGRAM(S)/KG/HR: 50 INJECTION INTRAVENOUS at 08:31

## 2021-01-01 RX ADMIN — ENOXAPARIN SODIUM 40 MILLIGRAM(S): 100 INJECTION SUBCUTANEOUS at 17:11

## 2021-12-25 NOTE — ED ADULT NURSE NOTE - OBJECTIVE STATEMENT
Break coverage RN: 60y/o female non-verbal, non-ambulatory at baseline c/o nonproductive cough x1 week. Pt family at bedside states pt crawls around at home and fell on her L hip c/o pain. Pt found hypoxic to 80s on room air. Pt placed on NRB, O2 improved. Vital signs as noted. 20g in RAC; labs collected and sent as per MD order. Pt placed on cardiac monitor.

## 2021-12-25 NOTE — ED PROVIDER NOTE - PHYSICAL EXAMINATION
PHYSICAL EXAM:  GENERAL: NAD, thin appearing, contracted female  HEAD: Atraumatic, Normocephalic  EYES: +cataracts, PERRL, conjunctiva and sclera clear  ENMT: Dry mucous membranes  NECK: Supple, No JVD, Normal Thyroid  CHEST/LUNG: Course breath sounds b/l  HEART: Regular rate and rhythm; S1 and S2;  ABDOMEN: Soft, Nontender, Nondistended  SKIN: No rashes or lesions  MSK: thin 2/2 atrophy b/l   NEURO: Alert, non verbal, ANDINO, follows commands, non focal

## 2021-12-25 NOTE — ED PROVIDER NOTE - ATTENDING CONTRIBUTION TO CARE
Attending Statement: I have personally seen and examined this patient. I have fully participated in the care of this patient. I have reviewed all pertinent clinical information, including history physical exam, plan and the Resident's note and agree except as noted  61 F with PMHx polio (bedbound), HTN, HLD, T2DM from home w brother at bedside co coughing, decrease po intake and left hip swelling for one week. Pt nonverbal at baseline, unable to give hx. Brother states she has had nonproductive cough x one week. Decrease po intake kraig the last two days. no vomiting or diarrhea. no fever.  Per family pt "crawls  around the house" "uses the bathroom and dresses herself" ?fall while crawling at home. Family noticed swelling of the left thigh. pt is not vaccinated for covid.   Vital signs noted. pt sitting up, hypoxic pulse ox  69-77RA good wave form, placed on NRB pulse ox increased to 94-96 +tachycardic +tachypneic min inspiratory effort, ronchi noted. soft no grimace palpation of abdomen. no ecchymosis noted on back/abdomen/bl arms.  stable pelvis, pt grimace to touch of bl hips. atrophy bl , knees held in flexed position, unable to straighten lower ext. no lac or abrasion noted  plan labs, ua, cxr, pelvis xray, left hip/femur xray, tele monitor, ct chest r/o PE, tba

## 2021-12-25 NOTE — ED PROVIDER NOTE - OBJECTIVE STATEMENT
Patient is 61 F with PMHx polio (bedbound), HTN, HLD, T2DM presents with left hip pain and non-productive cough for 1 week. Patient crawls around home, non verbal, able to do ADLs per family independently. Patient reportedly slipped while crawling in home and fell onto left hip. Patient also reportedly having non-productive cough with increased work of breathing for 1 week. In EMS, hypoxic to 93% on 3L NC. When off O2 in room, hypoxic to 70s, placed on NRB. Patient is 61 F with PMHx polio (bedbound), HTN, HLD, T2DM presents with left hip pain and non-productive cough for 1 week. Patient crawls around home, non verbal, able to do ADLs per family independently. Patient reportedly slipped while crawling in home and fell onto left hip. Patient also reportedly having non-productive cough with increased work of breathing for 1 week. In EMS, hypoxic to 93% on 3L NC. When off O2 in room, hypoxic to 70s, placed on NRB. Not vaccinated against COVID. Patient is 61 F with PMHx polio (bedbound), HTN, HLD, T2DM presents with left hip pain and non-productive cough for 1 week. Patient crawls around home, non verbal, able to do ADLs per family independently. Patient reportedly slipped while crawling in home and fell onto left hip. Patient also reportedly having non-productive cough with increased work of breathing for 1 week. In EMS, hypoxic to 93% on 3L NC. When off O2 in room, hypoxic to 70s, placed on NRB. Not vaccinated against COVID. FULL CODE

## 2021-12-25 NOTE — ED ADULT TRIAGE NOTE - CHIEF COMPLAINT QUOTE
hx of polio pt is usually not ambulatory and is bed ridden and non verbal normally ,currently as per brother pt can usually communicate better for 4 days

## 2021-12-25 NOTE — ED ADULT NURSE REASSESSMENT NOTE - NS ED NURSE REASSESS COMMENT FT1
Pt at baseline mental status, responsive to voice with purposeful movements, nonverbal.    Pt requiring NRB to maintain spo2 94% and greater.   Respirations even and unlabored.  IV site unremarkable.    IVF started and decadron given.    Rectal temp as charted.    Pt sterile straight cath for urine.    Awaiting inpatient bed.

## 2021-12-25 NOTE — CONSULT NOTE ADULT - ATTENDING COMMENTS
Agree with above. 61F w/ hx of htn, hld, dm, polio, non-verbal, baseline contactures w/ crawling, presents w/ L hip pain and SOB. Imaging shows a chronically dislocated L hip. No acute findings.    No orthopaedic surgical intervention. WBAT.  FU PRN.

## 2021-12-25 NOTE — ED PROVIDER NOTE - PROGRESS NOTE DETAILS
pulse ox improved while on NRB. pending labs/ cxr, covid, ct angio chest to be done. will need admission. sign out to night team COVID pos, given decadron, admit to medicine on tele XR showing superior L hip dislocation with questionable R hip dislocation. Ortho consulted. Discussion about possible in ED reduction but high risk for procedural dislocation given hypoxic resp failure. To be seen by ortho LYDIA:  Patient signed out to me by Dr. Cueva pending CTA and x-rays.  Patient found to be covid-19 positive, on nrb mask due to hypoxemic respiratory failure with sats 100% but desaturations to high 70s on room air.  No increased work of breathing requiring NIPPV.   Hip and pelvis x-rays showing left sided superior hip dislocation, possible right sided hip dislocation as well.  Per sign out, patient with possible fall onto left hip one day ago.  Orthopedic surgery consulted given dislocation findings, who recommend procedural sedation with attempted reduction in ED.  Given patient's hypoxemic respiratory failure, risk of sedation in ED outweigh benefits as patient may experience worsening respiratory failure, hypoxemia, possible death.  Will admit patient to medicine service.

## 2021-12-25 NOTE — PATIENT PROFILE ADULT - FALL HARM RISK - HARM RISK INTERVENTIONS

## 2021-12-25 NOTE — ED ADULT NURSE NOTE - NSIMPLEMENTINTERV_GEN_ALL_ED
Implemented All Fall Risk Interventions:  [place] to call system. Call bell, personal items and telephone within reach. Instruct patient to call for assistance. Room bathroom lighting operational. Non-slip footwear when patient is off stretcher. Physically safe environment: no spills, clutter or unnecessary equipment. Stretcher in lowest position, wheels locked, appropriate side rails in place. Provide visual cue, wrist band, yellow gown, etc. Monitor gait and stability. Monitor for mental status changes and reorient to person, place, and time. Review medications for side effects contributing to fall risk. Reinforce activity limits and safety measures with patient and family.

## 2021-12-25 NOTE — PATIENT PROFILE ADULT - NSPROHMSYMPCOND_GEN_A_NUR
KATIA sheikh    Was told in report that the patient crawls around at home and has a history of falls/neurologic

## 2021-12-25 NOTE — ED PROVIDER NOTE - CLINICAL SUMMARY MEDICAL DECISION MAKING FREE TEXT BOX
61 F with PMHx polio (bedbound), HTN, HLD, T2DM presents with left hip pain and non-productive cough for 1 week. In EMS, hypoxic to 93% on 3L NC. When off O2 in room, hypoxic to 70s, placed on NRB. DDx COVID, PE, PNA. Also DDx hip fracture vs dislocation. Currently on NRB. CBC, CMP, RVP, VBG, trop, BNP, EKG, XR of pelvis/hip/fracture, Will reassess. Pain control.

## 2021-12-25 NOTE — ED PROVIDER NOTE - NSICDXPASTMEDICALHX_GEN_ALL_CORE_FT
PAST MEDICAL HISTORY:  HTN (hypertension)     Hyperlipidemia     Polio     Type 2 diabetes mellitus

## 2021-12-26 NOTE — H&P ADULT - PROBLEM SELECTOR PLAN 10
- DVT PPx w/ Lovenox 40mg, SQ, daily - DVT PPx w/ Lovenox 1mg/kg BID given her elevated d-dimer and COVID19+ results

## 2021-12-26 NOTE — H&P ADULT - RS GEN PE MLT RESP DETAILS PC
normal/airway patent/breath sounds equal/good air movement/respirations non-labored/clear to auscultation bilaterally/no chest wall tenderness/no intercostal retractions/no rales/no rhonchi/no wheezes airway patent/breath sounds equal/good air movement/respirations non-labored/no intercostal retractions/rales

## 2021-12-26 NOTE — PHYSICAL THERAPY INITIAL EVALUATION ADULT - ADDITIONAL COMMENTS
patient is nonverbal, unable to provide prior level of function. Given hip flexion & knee flexion contractures, patient likely wheelchair bound or bedbound. Patient appears at functional baseline

## 2021-12-26 NOTE — H&P ADULT - MUSCULOSKELETAL COMMENTS
Contracted, clubfoot deformity of right foot, TTP over lateral left hip, hip joint visible enlarged, unable to assess ROM d/t contractures

## 2021-12-26 NOTE — PROGRESS NOTE ADULT - SUBJECTIVE AND OBJECTIVE BOX
Patient is a 61y old  Female who presents with a chief complaint of Covid (26 Dec 2021 02:28)      SUBJECTIVE / OVERNIGHT EVENTS:    MEDICATIONS  (STANDING):  chlorhexidine 2% Cloths 1 Application(s) Topical daily  dexAMETHasone  Injectable 6 milliGRAM(s) IV Push daily  dextrose 40% Gel 15 Gram(s) Oral once  dextrose 5%. 1000 milliLiter(s) (50 mL/Hr) IV Continuous <Continuous>  dextrose 5%. 1000 milliLiter(s) (100 mL/Hr) IV Continuous <Continuous>  dextrose 50% Injectable 25 Gram(s) IV Push once  dextrose 50% Injectable 12.5 Gram(s) IV Push once  dextrose 50% Injectable 25 Gram(s) IV Push once  enoxaparin Injectable 40 milliGRAM(s) SubCutaneous every 12 hours  glucagon  Injectable 1 milliGRAM(s) IntraMuscular once  insulin lispro (ADMELOG) corrective regimen sliding scale   SubCutaneous every 6 hours  remdesivir  IVPB   IV Intermittent   remdesivir  IVPB 200 milliGRAM(s) IV Intermittent every 24 hours  sodium chloride 0.45%. 1000 milliLiter(s) (100 mL/Hr) IV Continuous <Continuous>    MEDICATIONS  (PRN):  acetaminophen  Suppository .. 650 milliGRAM(s) Rectal every 4 hours PRN Temp greater or equal to 38.5C (101.3F)  ALBUTerol    90 MICROgram(s) HFA Inhaler 2 Puff(s) Inhalation every 4 hours PRN Shortness of Breath and/or Wheezing  ondansetron Injectable 4 milliGRAM(s) IV Push every 8 hours PRN Nausea and/or Vomiting      Vital Signs Last 24 Hrs  T(C): 36.9 (26 Dec 2021 05:00), Max: 38 (25 Dec 2021 18:44)  T(F): 98.4 (26 Dec 2021 05:00), Max: 100.4 (25 Dec 2021 18:44)  HR: 82 (26 Dec 2021 05:00) (82 - 105)  BP: 131/87 (26 Dec 2021 05:00) (131/87 - 152/92)  BP(mean): --  RR: 18 (26 Dec 2021 05:00) (16 - 26)  SpO2: 95% (26 Dec 2021 05:00) (92% - 99%)  CAPILLARY BLOOD GLUCOSE      POCT Blood Glucose.: 321 mg/dL (26 Dec 2021 06:42)  POCT Blood Glucose.: 289 mg/dL (26 Dec 2021 02:22)  POCT Blood Glucose.: 191 mg/dL (25 Dec 2021 20:36)    I&O's Summary      PHYSICAL EXAM:  GENERAL: NAD, well-developed  HEAD:  Atraumatic, Normocephalic  EYES: EOMI, PERRLA, conjunctiva and sclera clear  NECK: Supple, No JVD  CHEST/LUNG: Clear to auscultation bilaterally; No wheeze  HEART: Regular rate and rhythm; No murmurs, rubs, or gallops  ABDOMEN: Soft, Nontender, Nondistended; Bowel sounds present  EXTREMITIES:  2+ Peripheral Pulses, No clubbing, cyanosis, or edema  PSYCH: AAOx3  NEUROLOGY: non-focal  SKIN: No rashes or lesions    LABS:                        11.6   6.71  )-----------( 212      ( 26 Dec 2021 04:09 )             37.1     12-26    x   |  x   |  x   ----------------------------<  x   x    |  x   |  0.87    Ca    8.6      26 Dec 2021 04:09  Phos  3.6     12-25  Mg     2.40     12-25    TPro  6.7  /  Alb  3.5  /  TBili  0.5  /  DBili  0.2  /  AST  76<H>  /  ALT  55<H>  /  AlkPhos  96  12-26    PT/INR - ( 26 Dec 2021 07:17 )   PT: 11.3 sec;   INR: 0.99 ratio               Urinalysis Basic - ( 25 Dec 2021 18:48 )    Color: Yellow / Appearance: Clear / S.046 / pH: x  Gluc: x / Ketone: Negative  / Bili: Negative / Urobili: <2 mg/dL   Blood: x / Protein: 30 mg/dL / Nitrite: Negative   Leuk Esterase: Small / RBC: 3 /HPF / WBC 7 /HPF   Sq Epi: x / Non Sq Epi: 3 /HPF / Bacteria: Occasional        RADIOLOGY & ADDITIONAL TESTS:    Imaging Personally Reviewed:    Consultant(s) Notes Reviewed:      Care Discussed with Consultants/Other Providers:   Patient is a 61y old  Female who presents with a chief complaint of Covid (26 Dec 2021 02:28)      SUBJECTIVE / OVERNIGHT EVENTS: patient seen and examined by bedside, no acute distress noted        MEDICATIONS  (STANDING):  chlorhexidine 2% Cloths 1 Application(s) Topical daily  dexAMETHasone  Injectable 6 milliGRAM(s) IV Push daily  dextrose 40% Gel 15 Gram(s) Oral once  dextrose 5%. 1000 milliLiter(s) (50 mL/Hr) IV Continuous <Continuous>  dextrose 5%. 1000 milliLiter(s) (100 mL/Hr) IV Continuous <Continuous>  dextrose 50% Injectable 25 Gram(s) IV Push once  dextrose 50% Injectable 12.5 Gram(s) IV Push once  dextrose 50% Injectable 25 Gram(s) IV Push once  enoxaparin Injectable 40 milliGRAM(s) SubCutaneous every 12 hours  glucagon  Injectable 1 milliGRAM(s) IntraMuscular once  insulin lispro (ADMELOG) corrective regimen sliding scale   SubCutaneous every 6 hours  remdesivir  IVPB   IV Intermittent   remdesivir  IVPB 200 milliGRAM(s) IV Intermittent every 24 hours  sodium chloride 0.45%. 1000 milliLiter(s) (100 mL/Hr) IV Continuous <Continuous>    MEDICATIONS  (PRN):  acetaminophen  Suppository .. 650 milliGRAM(s) Rectal every 4 hours PRN Temp greater or equal to 38.5C (101.3F)  ALBUTerol    90 MICROgram(s) HFA Inhaler 2 Puff(s) Inhalation every 4 hours PRN Shortness of Breath and/or Wheezing  ondansetron Injectable 4 milliGRAM(s) IV Push every 8 hours PRN Nausea and/or Vomiting      Vital Signs Last 24 Hrs  T(C): 36.9 (26 Dec 2021 05:00), Max: 38 (25 Dec 2021 18:44)  T(F): 98.4 (26 Dec 2021 05:00), Max: 100.4 (25 Dec 2021 18:44)  HR: 82 (26 Dec 2021 05:00) (82 - 105)  BP: 131/87 (26 Dec 2021 05:00) (131/87 - 152/92)  BP(mean): --  RR: 18 (26 Dec 2021 05:00) (16 - 26)  SpO2: 95% (26 Dec 2021 05:00) (92% - 99%)  CAPILLARY BLOOD GLUCOSE      POCT Blood Glucose.: 321 mg/dL (26 Dec 2021 06:42)  POCT Blood Glucose.: 289 mg/dL (26 Dec 2021 02:22)  POCT Blood Glucose.: 191 mg/dL (25 Dec 2021 20:36)    I&O's Summary      PHYSICAL EXAM:  GENERAL: NAD, cachectic   HEAD:  Atraumatic, Normocephalic  CHEST/LUNG: scattered rales b/L  No wheeze  HEART: Regular rate and rhythm; No murmurs, rubs, or gallops  ABDOMEN: Soft, Nontender, Nondistended; Bowel sounds present  EXTREMITIES:  contracted LE   PSYCH: calm   NEUROLOGY: non-verbal      LABS:                        11.6   6.71  )-----------( 212      ( 26 Dec 2021 04:09 )             37.1         x   |  x   |  x   ----------------------------<  x   x    |  x   |  0.87    Ca    8.6      26 Dec 2021 04:09  Phos  3.6       Mg     2.40         TPro  6.7  /  Alb  3.5  /  TBili  0.5  /  DBili  0.2  /  AST  76<H>  /  ALT  55<H>  /  AlkPhos  96      PT/INR - ( 26 Dec 2021 07:17 )   PT: 11.3 sec;   INR: 0.99 ratio               Urinalysis Basic - ( 25 Dec 2021 18:48 )    Color: Yellow / Appearance: Clear / S.046 / pH: x  Gluc: x / Ketone: Negative  / Bili: Negative / Urobili: <2 mg/dL   Blood: x / Protein: 30 mg/dL / Nitrite: Negative   Leuk Esterase: Small / RBC: 3 /HPF / WBC 7 /HPF   Sq Epi: x / Non Sq Epi: 3 /HPF / Bacteria: Occasional        RADIOLOGY & ADDITIONAL TESTS:    Imaging Personally Reviewed:    Consultant(s) Notes Reviewed:  ortho     Care Discussed with Consultants/Other Providers:

## 2021-12-26 NOTE — H&P ADULT - BACK EXAM
cannot attain upright position/ROM limited No spinal TTP noted on exam/cannot attain upright position/ROM limited

## 2021-12-26 NOTE — PROGRESS NOTE ADULT - PROBLEM SELECTOR PLAN 4
- Na 155, has trended to 153 while on D5+1/2 NS @ 75 cc/hr  - Likely 2/2 poor PO intake  - Monitor Na, avoid correction of >8 in 24 hours    Addendum:  Patient becoming more hyperglycemic, will change fluids to 1/2NS at 75 cc/hr for 10 hrs, monitor Na - Na 155, has trended to 153 while on D5+1/2 NS @ 75 cc/hr  - Likely 2/2 poor PO intake  - Monitor Na, avoid correction of >8 in 24 hours

## 2021-12-26 NOTE — H&P ADULT - NSHPPHYSICALEXAM_GEN_ALL_CORE
Vital Signs Last 24 Hrs  T(C): 36.9 (26 Dec 2021 05:00), Max: 38 (25 Dec 2021 18:44)  T(F): 98.4 (26 Dec 2021 05:00), Max: 100.4 (25 Dec 2021 18:44)  HR: 82 (26 Dec 2021 05:00) (82 - 105)  BP: 131/87 (26 Dec 2021 05:00) (131/87 - 152/92)  BP(mean): --  RR: 18 (26 Dec 2021 05:00) (16 - 26)  SpO2: 95% (26 Dec 2021 05:00) (92% - 99%)

## 2021-12-26 NOTE — H&P ADULT - NSHPSOURCEINFOTX_GEN_ALL_CORE
Emergency contact is brother - Elizabeth Kushal: [phone] - phone number goes straight to voicemail, unable to reach

## 2021-12-26 NOTE — PHYSICAL THERAPY INITIAL EVALUATION ADULT - RANGE OF MOTION EXAMINATION, REHAB EVAL
+hip flexion and knee flexion contractures/bilateral upper extremity ROM was WFL (within functional limits)

## 2021-12-26 NOTE — H&P ADULT - HISTORY OF PRESENT ILLNESS
60 y/o F, PMH of Polio (bedbound at baseline w/ chronic contractures), HTN, HLD, Type 2 DM (unknown if on insulin), presented to ED w/ c/o left hip pain and dry cough x 1 week.  Pt is non-verbal, unable to provide history of events surrounding her symptoms, attempted to pt's brother, Elizabeth Fatima @ [phone] for collateral information regarding the pt.  The phone goes straight to voicemail.  Chart checked, no medication lists, diet info, or supplemental historical documents are provided.  No medication rx's listed in outpatient medication record or in Surescripts.    All documentation here on will be referenced from ED provider note obtained during pt's ED visit.  Per ED note, pt's family reports she does her ADLs independently, pt reportedly slipped while crawling around the home causing a fall onto her left hip.  Pt also had non-productive cough and increased work of breathing for one week.   60 y/o F, PMH of Polio (bedbound at baseline w/ chronic contractures), HTN, HLD, Type 2 DM (unknown if on insulin), presented to ED w/ c/o left hip pain and dry cough x 1 week.  Pt is non-verbal, unable to provide history of events surrounding her symptoms, attempted to pt's brother, Elizabeth Fatima @ [phone] for collateral information regarding the pt.  The phone goes straight to voicemail.  Chart checked, no medication lists, diet info, or supplemental historical documents are provided.  No medication rx's listed in outpatient medication record or in Surescripts.    All documentation here on will be referenced from ED provider note obtained during pt's ED visit.  Per ED note, pt's family reports she does her ADLs independently, pt reportedly slipped while crawling around the home causing a fall onto her left hip.  Pt also had non-productive cough and increased work of breathing for one week.  Noted to be satting 93% on 3L NC in ED while in EMS area, was hypoxic to 70s, and placed on NRB in ED.  She is not vaccinated against Covid.  End of referenced documentation.    Pt noted to have L hip dislocation on X-Ray in ED w/ possible R hip dislocation, ortho was called by ED.  Ortho called writer stating this is a surgical emergency requiring urgent CT scan of the bony pelvis, expedited CT scan of the bony pelvis.  Prelim results d/w ortho, they said there is no indication for acute surgical intervention at this time as the dislocations are likely chronic based on the findings. 60 y/o F, PMH of Polio (bedbound at baseline w/ chronic contractures), HTN, HLD, Type 2 DM (unknown if on insulin), presented to ED w/ c/o left hip pain and dry cough x 1 week.  Pt is non-verbal, unable to provide history of events surrounding her symptoms, attempted to pt's brother, Elizabeth Fatima @ [phone] for collateral information regarding the pt.  The phone goes straight to voicemail.  Chart checked, no medication lists, diet info, or supplemental historical documents are provided.  No medication rx's listed in outpatient medication record or in Surescripts.    All documentation here on will be referenced from ED provider note obtained during pt's ED visit.  Per ED note, pt's family reports she does her ADLs independently, pt reportedly slipped while crawling around the home causing a fall onto her left hip.  Pt also had non-productive cough and increased work of breathing for one week.  Noted to be satting 93% on 3L NC in ED while in EMS area, was hypoxic to 70s, and placed on NRB in ED.  She is not vaccinated against Covid.  End of referenced documentation.    Pt noted to have L hip dislocation on X-Ray in ED w/ possible R hip dislocation, ortho was called by ED.  Ortho called writer stating this is a surgical emergency requiring urgent CT scan of the bony pelvis, expedited CT scan of the bony pelvis.  Prelim results d/w ortho, they said there is no indication for acute surgical intervention at this time as the dislocations are likely chronic based on the findings.    While in ED, her vitals were T 97.8 (Tmax 100.4 rectal), P 104, /92, RR 26, O2 sat 93% NC 3L. Her lab work showed hypoernatremia with elevated BUN/Cr ratio, her lactate was wnl, her UA showed some pyuria/hematuria with occ bacteria, and her RVP was positive for COVID19. Her CTA Chest showed no PE but showed patchy airspace opacities within all lobes of the lungs. Her CT Pelvis showed a chronic deformity of the L femoral head/acetabulum and pseudoarthrosis of the R lesser troch and acetabulum. She was given dexamethasone 6mg IVP and was admitted to medicine.

## 2021-12-26 NOTE — H&P ADULT - PROBLEM SELECTOR PLAN 4
- Na 155, has trended to 153 while on D5+1/2 NS @ 75 cc/hr  - Likely 2/2 poor PO intake  - Monitor Na, avoid correction of >8 in 24 hours - Na 155, has trended to 153 while on D5+1/2 NS @ 75 cc/hr  - Likely 2/2 poor PO intake  - Monitor Na, avoid correction of >8 in 24 hours    Addendum:  Patient becoming more hyperglycemic, will change fluids to 1/2NS at 75 cc/hr for 10 hrs, monitor Na

## 2021-12-26 NOTE — H&P ADULT - MOTOR
moving b/l arms without significant limitation but unable to move legs, suspect likely chronic 2/2 her polio

## 2021-12-26 NOTE — H&P ADULT - PROBLEM SELECTOR PLAN 3
- Likely chronic per CT scan and Orthopedic recs given h/o polio and chronic contractures  - No acute surgical intervention is needed per d/w Orthopedics team, recs appreciated  - WBAT BLE  - Pain control  - Need collateral information on events surrounding pt's hip pain at home  - PT Eval  - Social Work needed - Likely chronic per CT scan and Orthopedic recs given h/o polio and chronic contractures  - No acute surgical intervention is needed per d/w Orthopedics team, recs appreciated  - WBAT BLE  - Pain control as needed  - Need collateral information on events surrounding pt's hip pain at home  - PT Eval  - Social Work needed

## 2021-12-26 NOTE — H&P ADULT - PROBLEM SELECTOR PLAN 8
- Hold oral DM meds while inpatient   - A1C = 7.5%  - ISS - on q6h while NPO  - monitor FS  - Will need diabetic diet once recommendations determined from [facility]

## 2021-12-26 NOTE — PROGRESS NOTE ADULT - PROBLEM SELECTOR PLAN 1
- Pt currently requiring supplemental oxygen via NRB, titrated down to 6L NC overnight, now satting 95%  - c/w Dexamethasone and Remdesivir   - Inflammatory markers q72hrs   - Monitor SP02 on continuous pulse ox  - CTA Chest completed, negative for PE  - Check US venous duplex b/l LE given her elevated d-dimer  - D-Dimer > 2x ULN, will place on Lovenox 1mg/kg BID as per COVID protocol  - Has mild transaminitis, likely 2/2 COVID, would trend LFTs for now  - Prone positioning, Incentive spirometry and Chest PT PRN. - Pt currently requiring supplemental oxygen via NRB, titrated down to 6L NC overnight, now satting 95% on 6l NC   - c/w Dexamethasone and Remdesivir   - Inflammatory markers q72hrs   - Monitor SP02 on continuous pulse ox  - CTA Chest completed, negative for PE  - Check US venous duplex b/l LE given her elevated d-dimer  - D-Dimer > 2x ULN, will place on Lovenox 1mg/kg BID as per COVID protocol  - Has mild transaminitis, likely 2/2 COVID, would trend LFTs for now  - Prone positioning, Incentive spirometry and Chest PT PRN.

## 2021-12-26 NOTE — PROGRESS NOTE ADULT - ASSESSMENT
62 y/o F, PMH of Polio (bedbound at baseline w/ chronic contractures), HTN, HLD, Type 2 DM (unknown if on insulin), presented to ED w/ c/o left hip pain and dry cough x 1 week, found to be Covid+ and have L hip dislocation for which ortho was consulted.

## 2021-12-26 NOTE — H&P ADULT - PROBLEM SELECTOR PLAN 9
- Email med rec, unknown what medications pt takes and unable to reach family for collateral
Kem Cabezas

## 2021-12-26 NOTE — H&P ADULT - ASSESSMENT
60 y/o F, PMH of Polio (bedbound at baseline w/ chronic contractures), HTN, HLD, Type 2 DM (unknown if on insulin), presented to ED w/ c/o left hip pain and dry cough x 1 week, found to be Covid+ and have L hip dislocation for which ortho was consulted.

## 2021-12-26 NOTE — H&P ADULT - PROBLEM SELECTOR PLAN 1
- Pt currently requiring supplemental oxygen via NRB, titrated down to 6L NC overnight, now satting 95%  - c/w Dexamethasone and Remdesivir   - Inflammatory markers q72hrs   - Monitor SP02 on continuous pulse ox  - CTA Chest completed, negative for PE  - Prone positioning, Incentive spirometry and Chest PT PRN. - Pt currently requiring supplemental oxygen via NRB, titrated down to 6L NC overnight, now satting 95%  - c/w Dexamethasone and Remdesivir   - Inflammatory markers q72hrs   - Monitor SP02 on continuous pulse ox  - CTA Chest completed, negative for PE  - Check US venous duplex b/l LE given her elevated d-dimer  - D-Dimer > 2x ULN, will place on Lovenox 1mg/kg BID as per COVID protocol  - Has mild transaminitis, likely 2/2 COVID, would trend LFTs for now  - Prone positioning, Incentive spirometry and Chest PT PRN.

## 2021-12-26 NOTE — H&P ADULT - PROBLEM SELECTOR PLAN 6
- Med rec needed, no medication h/o provided in ED, no paperwork left by family  - monitor BP   - NPO pending S+S

## 2021-12-26 NOTE — CHART NOTE - NSCHARTNOTEFT_GEN_A_CORE
Attempted several times to reach patient's emergency contact Elizabeth Fatima, patient's brother. There was no answer and call went directly to general voice mail. Called patient's home pharmacy [facility] at [phone], in an attempt to obtain patient's medication history, but there was no answer. Attending physician updated, will continue to try and reach family.

## 2021-12-26 NOTE — PHYSICAL THERAPY INITIAL EVALUATION ADULT - PERTINENT HX OF CURRENT PROBLEM, REHAB EVAL
patient is a 61 year old female admitted for medical management of COVID. Xrays in ED found evidence of bilateral hip dislocations, as per Orthopedics note- no surgical intervention given dislocations are likely chronic considering patient's PMH of aguilar

## 2021-12-27 NOTE — PROGRESS NOTE ADULT - SUBJECTIVE AND OBJECTIVE BOX
Hospitalist Progress Note  Denise Joya MD Pager # [phone]    OVERNIGHT EVENTS: JESUS    SUBJECTIVE / INTERVAL HPI: Patient seen and examined at bedside. Pt is non-verbal 2/2 medical conditions. She appears uncomfortable and is writhing in bed. She cannot communicate what is bothering her. ROS not obtained.     VITAL SIGNS:  Vital Signs Last 24 Hrs  T(C): 36.5 (27 Dec 2021 12:44), Max: 36.5 (27 Dec 2021 12:44)  T(F): 97.7 (27 Dec 2021 12:44), Max: 97.7 (27 Dec 2021 12:44)  HR: 98 (27 Dec 2021 12:44) (74 - 98)  BP: 150/82 (27 Dec 2021 12:54) (140/74 - 157/92)  BP(mean): --  RR: 16 (27 Dec 2021 12:54) (16 - 18)  SpO2: 97% (27 Dec 2021 12:54) (95% - 100%)    PHYSICAL EXAM:    General: WDWN female - uncomfortable appearing.   HEENT: NC/AT; PERRL, anicteric sclera; MMM  Neck: supple  Cardiovascular: +S1/S2; RRR  Respiratory: Tachypneic with diffuse bilateral rhonchi in all lung fields. Not in respiratory distress.   Gastrointestinal: soft, NT/ND; +BSx4  Extremities: WWP; no edema, clubbing or cyanosis. LE muscle wasting and contracted 2/2 polio.  Vascular: 2+ radial, DP/PT pulses B/L  Neurological: AAOx3; no focal deficits    MEDICATIONS:  MEDICATIONS  (STANDING):  azithromycin  IVPB      azithromycin  IVPB 500 milliGRAM(s) IV Intermittent once  cefTRIAXone   IVPB 1000 milliGRAM(s) IV Intermittent once  cefTRIAXone   IVPB      chlorhexidine 2% Cloths 1 Application(s) Topical daily  dexAMETHasone  Injectable 6 milliGRAM(s) IV Push daily  dextrose 40% Gel 15 Gram(s) Oral once  dextrose 5%. 1000 milliLiter(s) (50 mL/Hr) IV Continuous <Continuous>  dextrose 5%. 1000 milliLiter(s) (100 mL/Hr) IV Continuous <Continuous>  dextrose 50% Injectable 25 Gram(s) IV Push once  dextrose 50% Injectable 12.5 Gram(s) IV Push once  dextrose 50% Injectable 25 Gram(s) IV Push once  enoxaparin Injectable 40 milliGRAM(s) SubCutaneous every 12 hours  glucagon  Injectable 1 milliGRAM(s) IntraMuscular once  insulin lispro (ADMELOG) corrective regimen sliding scale   SubCutaneous every 6 hours  lidocaine   4% Patch 1 Patch Transdermal daily  mupirocin 2% Ointment 1 Application(s) Topical two times a day  remdesivir  IVPB 100 milliGRAM(s) IV Intermittent every 24 hours  remdesivir  IVPB   IV Intermittent   sodium chloride 0.45%. 1000 milliLiter(s) (100 mL/Hr) IV Continuous <Continuous>    MEDICATIONS  (PRN):  acetaminophen  Suppository .. 650 milliGRAM(s) Rectal every 6 hours PRN Temp greater or equal to 38.5C (101.3F), Mild Pain (1 - 3), Moderate Pain (4 - 6), Severe Pain (7 - 10)  ALBUTerol    90 MICROgram(s) HFA Inhaler 2 Puff(s) Inhalation every 4 hours PRN Shortness of Breath and/or Wheezing  ondansetron Injectable 4 milliGRAM(s) IV Push every 8 hours PRN Nausea and/or Vomiting      ALLERGIES:  Allergies    No Known Allergies    Intolerances        LABS:                        11.6   6.71  )-----------( 212      ( 26 Dec 2021 04:09 )             37.1     12-    153<H>  |  113<H>  |  37<H>  ----------------------------<  175<H>  3.7   |  27  |  0.65    Ca    8.5      27 Dec 2021 07:23  Phos  3.6     12-  Mg     2.40     12-    TPro  7.0  /  Alb  3.5  /  TBili  0.6  /  DBili  0.2  /  AST  84<H>  /  ALT  58<H>  /  AlkPhos  107  12-    PT/INR - ( 27 Dec 2021 07:20 )   PT: 11.9 sec;   INR: 1.04 ratio           Urinalysis Basic - ( 25 Dec 2021 18:48 )    Color: Yellow / Appearance: Clear / S.046 / pH: x  Gluc: x / Ketone: Negative  / Bili: Negative / Urobili: <2 mg/dL   Blood: x / Protein: 30 mg/dL / Nitrite: Negative   Leuk Esterase: Small / RBC: 3 /HPF / WBC 7 /HPF   Sq Epi: x / Non Sq Epi: 3 /HPF / Bacteria: Occasional      CAPILLARY BLOOD GLUCOSE      POCT Blood Glucose.: 215 mg/dL (27 Dec 2021 12:26)      RADIOLOGY & ADDITIONAL TESTS: Reviewed.    ASSESSMENT:    PLAN:

## 2021-12-27 NOTE — CHART NOTE - NSCHARTNOTEFT_GEN_A_CORE
1630-Patient awake, nonverbal at baseline, slightly restless, with O2 on via 6 L NC. Continuous pulse ox in place, O2 sat dropped into 70's. Patient repositioned with pulse Ox readjusted, with slight improvement. O2 NC changed to 100% NRB, with improved O2 sat 88-91%, within minutes O2 sat back into the low 80's. Patient placed on hiflow nasal O2 with FIo2 100%, with little improvement, O2 100% NRB also in place, with O2 sat slowly improving, back up into the high 80's. Stat chest XR ordered and VBG obtained. Patient placed on tele with heart rate 130-150, patient decompensating with O2 sat back into the 70's, now lethargic. Attending Physician Dr Joya notified. Rapid Response called. Brother Elizabeth Fatima, emergency contact  called and notified, unable to determine on patient's GOC. Further discussion with MAR, with decision to keep patient a full code at this time. Patient will be intubated on Vent.

## 2021-12-27 NOTE — PROGRESS NOTE ADULT - PROBLEM SELECTOR PLAN 9
- Hold oral DM meds while inpatient   - A1C = 7.5%  - ISS - on q6h while NPO  - monitor FS  - Will need diabetic diet once recommendations determined from S+S
- Email med rec, unknown what medications pt takes and unable to reach family for collateral

## 2021-12-27 NOTE — CHART NOTE - NSCHARTNOTEFT_GEN_A_CORE
MICU Transfer Accept Note    Transfer from: Floors  Transfer to:  MICU      HOSPITAL COURSE:    62 y/o F, PMH of Polio (bedbound at baseline w/ chronic contractures), HTN, HLD, Type 2 DM (unknown if on insulin), presented to ED w/ c/o left hip pain and dry cough x 1 week.  Pt is non-verbal, unable to provide history of events surrounding her symptoms, attempted to pt's brother, Elizabeth Fatima @ [phone] for collateral information regarding the pt.  The phone goes straight to voicemail.  Chart checked, no medication lists, diet info, or supplemental historical documents are provided.  No medication rx's listed in outpatient medication record or in Surescripts.    All documentation here on will be referenced from ED provider note obtained during pt's ED visit.  Per ED note, pt's family reports she does her ADLs independently, pt reportedly slipped while crawling around the home causing a fall onto her left hip.  Pt also had non-productive cough and increased work of breathing for one week.  Noted to be satting 93% on 3L NC in ED while in EMS area, was hypoxic to 70s, and placed on NRB in ED.  She is not vaccinated against Covid.  End of referenced documentation.    Pt noted to have L hip dislocation on X-Ray in ED w/ possible R hip dislocation, ortho was called by ED.  Ortho called writer stating this is a surgical emergency requiring urgent CT scan of the bony pelvis, expedited CT scan of the bony pelvis.  Prelim results d/w ortho, they said there is no indication for acute surgical intervention at this time as the dislocations are likely chronic based on the findings.    While in ED, her vitals were T 97.8 (Tmax 100.4 rectal), P 104, /92, RR 26, O2 sat 93% NC 3L. Her lab work showed hypernatremia with elevated BUN/Cr ratio, her lactate was wnl, her UA showed some pyuria/hematuria with occ bacteria, and her RVP was positive for COVID19. Her CTA Chest showed no PE but showed patchy airspace opacities within all lobes of the lungs. Her CT Pelvis showed a chronic deformity of the L femoral head/acetabulum and pseudoarthrosis of the R lesser troch and acetabulum. She was given dexamethasone 6mg IVP and was admitted to medicine.     RRT was called due to patient's persistent hypoxia. She was placed on high flow nasal cannula with overlayed nonrebreather mask with saturations going up to 90%. Decision was made to intubate the patient 2/2 hypoxic respiratory failure.       OBJECTIVE:    Vital Signs Last 24 Hrs  T(C): 36.5 (27 Dec 2021 12:44), Max: 36.5 (27 Dec 2021 12:44)  T(F): 97.7 (27 Dec 2021 12:44), Max: 97.7 (27 Dec 2021 12:44)  HR: 105 (27 Dec 2021 17:00) (74 - 105)  BP: 150/82 (27 Dec 2021 12:54) (140/74 - 157/92)  BP(mean): --  RR: 20 (27 Dec 2021 17:00) (16 - 20)  SpO2: 90% (27 Dec 2021 17:00) (90% - 100%)  I&O's Summary    26 Dec 2021 07:01  -  27 Dec 2021 07:00  --------------------------------------------------------  IN: 1200 mL / OUT: 800 mL / NET: 400 mL    27 Dec 2021 07:01  -  27 Dec 2021 18:51  --------------------------------------------------------  IN: 0 mL / OUT: 400 mL / NET: -400 mL        PHYSICAL EXAM:  General: NAD, resting comfortably  HEENT: NC/AT, PERRLA, EOMI, oropharnyx clear, no LAD  CV: RRR, normal S1,S2; no murmurs; no peripheral edema   Pulm: lungs CTAB, no crackles, rhonchi, or wheezes  Abd: soft, non-tender, non-distended, normal bowel sounds  Psych/Neuro: A&Ox3, nonfocal, strength grossly intact, normal affect  Skin: warm, dry    MEDICATIONS  (STANDING):  azithromycin  IVPB      azithromycin  IVPB 500 milliGRAM(s) IV Intermittent once  cefTRIAXone   IVPB      chlorhexidine 2% Cloths 1 Application(s) Topical daily  dexAMETHasone  Injectable 6 milliGRAM(s) IV Push daily  dextrose 40% Gel 15 Gram(s) Oral once  dextrose 5%. 1000 milliLiter(s) (50 mL/Hr) IV Continuous <Continuous>  dextrose 5%. 1000 milliLiter(s) (100 mL/Hr) IV Continuous <Continuous>  dextrose 50% Injectable 25 Gram(s) IV Push once  dextrose 50% Injectable 12.5 Gram(s) IV Push once  dextrose 50% Injectable 25 Gram(s) IV Push once  enoxaparin Injectable 40 milliGRAM(s) SubCutaneous every 12 hours  glucagon  Injectable 1 milliGRAM(s) IntraMuscular once  insulin lispro (ADMELOG) corrective regimen sliding scale   SubCutaneous every 6 hours  lidocaine   4% Patch 1 Patch Transdermal daily  mupirocin 2% Ointment 1 Application(s) Topical two times a day  remdesivir  IVPB   IV Intermittent   remdesivir  IVPB 100 milliGRAM(s) IV Intermittent every 24 hours  sodium chloride 0.45%. 1000 milliLiter(s) (100 mL/Hr) IV Continuous <Continuous>    MEDICATIONS  (PRN):  acetaminophen  Suppository .. 650 milliGRAM(s) Rectal every 6 hours PRN Temp greater or equal to 38.5C (101.3F), Mild Pain (1 - 3), Moderate Pain (4 - 6), Severe Pain (7 - 10)  ALBUTerol    90 MICROgram(s) HFA Inhaler 2 Puff(s) Inhalation every 4 hours PRN Shortness of Breath and/or Wheezing  ondansetron Injectable 4 milliGRAM(s) IV Push every 8 hours PRN Nausea and/or Vomiting        LABS                              153    |  113    |  37                  Calcium: 8.5   / iCa: x      (12-27 @ 07:23)    ----------------------------<  175       Magnesium: x                                3.7     |  27     |  0.65             Phosphorous: x        TPro  7.0    /  Alb  3.5    /  TBili  0.6    /  DBili  0.2    /  AST  84     /  ALT  58     /  AlkPhos  107    27 Dec 2021 07:20    ( 12-27 @ 07:20 )   PT: 11.9 sec;   INR: 1.04 ratio  aPTT: x            ASSESSMENT & PLAN:  62 y/o F, PMH of Polio (bedbound at baseline w/ chronic contractures), HTN, HLD, Type 2 DM (unknown if on insulin), presented to ED w/ c/o left hip pain and dry cough x 1 week, found to be Covid+ and have L hip dislocation for which ortho was consulted for which it was determined she needed no acute surgical intervention. Course complicated by persistent hypoxia in the setting of multifocal pneumonia 2/2 COVID and/or aspiration.     Neuro:  Baseline nonverbal but responds to commands and appears alert. Currently intubated and sedated.     Respiratory:   #Acute hypoxic respiratory failure-   - s/p intubation, mechanical ventilation, follow up post tube ABG, CXR  -COVID +, was being treated with dexamethasone and remdesvir. Will continue in ICU  -Empirically tx for CAP w/ Azithromycin and ceftriaxone.   -Previous CT negative for PE, on Lovenox BID    Cardiac:   #HTN- Continue meds as tolerated  #Hyperlipidemia- Continue statin      GI:  No active issues. NPO for now, will start tube feeds in AM.     /Renal:    #Hypernatremia- Previously on D5NS, will reassess  #T2DM- Continue SSI in ICU    MSK:    #Polio- Chronic contractures. No intervention at this time. Follow-up nutrition recommendations.   #Chronic Hip dislocations- Ortho following. No acute surgical intervention needed. Will ctm.       Prophylaxis:  #Lovenox BID     GOC:   Will attempt to contact family and obtain code status. MICU Transfer Accept Note    Transfer from: Floors  Transfer to:  MICU      HOSPITAL COURSE:    62 y/o F, PMH of Polio (bedbound at baseline w/ chronic contractures), HTN, HLD, Type 2 DM (unknown if on insulin), presented to ED w/ c/o left hip pain and dry cough x 1 week.  Pt is non-verbal, unable to provide history of events surrounding her symptoms, attempted to pt's brother, Elizabeth Fatima @ [phone] for collateral information regarding the pt.  The phone goes straight to voicemail.  Chart checked, no medication lists, diet info, or supplemental historical documents are provided.  No medication rx's listed in outpatient medication record or in Surescripts.    All documentation here on will be referenced from ED provider note obtained during pt's ED visit.  Per ED note, pt's family reports she does her ADLs independently, pt reportedly slipped while crawling around the home causing a fall onto her left hip.  Pt also had non-productive cough and increased work of breathing for one week.  Noted to be satting 93% on 3L NC in ED while in EMS area, was hypoxic to 70s, and placed on NRB in ED.  She is not vaccinated against Covid.  End of referenced documentation.    Pt noted to have L hip dislocation on X-Ray in ED w/ possible R hip dislocation, ortho was called by ED.  Ortho called writer stating this is a surgical emergency requiring urgent CT scan of the bony pelvis, expedited CT scan of the bony pelvis.  Prelim results d/w ortho, they said there is no indication for acute surgical intervention at this time as the dislocations are likely chronic based on the findings.    While in ED, her vitals were T 97.8 (Tmax 100.4 rectal), P 104, /92, RR 26, O2 sat 93% NC 3L. Her lab work showed hypernatremia with elevated BUN/Cr ratio, her lactate was wnl, her UA showed some pyuria/hematuria with occ bacteria, and her RVP was positive for COVID19. Her CTA Chest showed no PE but showed patchy airspace opacities within all lobes of the lungs. Her CT Pelvis showed a chronic deformity of the L femoral head/acetabulum and pseudoarthrosis of the R lesser troch and acetabulum. She was given dexamethasone 6mg IVP and was admitted to medicine.     RRT was called due to patient's persistent hypoxia. She was placed on high flow nasal cannula with overlayed nonrebreather mask with saturations going up to 90% but subsequently desatted further to mid 70s. ABG showed P/F of 47. Decision was made to intubate the patient 2/2 ARDS and hypoxic respiratory failure.       OBJECTIVE:    Vital Signs Last 24 Hrs  T(C): 36.5 (27 Dec 2021 12:44), Max: 36.5 (27 Dec 2021 12:44)  T(F): 97.7 (27 Dec 2021 12:44), Max: 97.7 (27 Dec 2021 12:44)  HR: 105 (27 Dec 2021 17:00) (74 - 105)  BP: 150/82 (27 Dec 2021 12:54) (140/74 - 157/92)  BP(mean): --  RR: 20 (27 Dec 2021 17:00) (16 - 20)  SpO2: 90% (27 Dec 2021 17:00) (90% - 100%)  I&O's Summary    26 Dec 2021 07:01  -  27 Dec 2021 07:00  --------------------------------------------------------  IN: 1200 mL / OUT: 800 mL / NET: 400 mL    27 Dec 2021 07:01  -  27 Dec 2021 18:51  --------------------------------------------------------  IN: 0 mL / OUT: 400 mL / NET: -400 mL        PHYSICAL EXAM:  General: NAD, resting comfortably  HEENT: NC/AT, PERRLA, EOMI, oropharnyx clear, no LAD  CV: RRR, normal S1,S2; no murmurs; no peripheral edema   Pulm: lungs CTAB, no crackles, rhonchi, or wheezes  Abd: soft, non-tender, non-distended, normal bowel sounds  Psych/Neuro: A&Ox3, nonfocal, strength grossly intact, normal affect  Skin: warm, dry    MEDICATIONS  (STANDING):  azithromycin  IVPB      azithromycin  IVPB 500 milliGRAM(s) IV Intermittent once  cefTRIAXone   IVPB      chlorhexidine 2% Cloths 1 Application(s) Topical daily  dexAMETHasone  Injectable 6 milliGRAM(s) IV Push daily  dextrose 40% Gel 15 Gram(s) Oral once  dextrose 5%. 1000 milliLiter(s) (50 mL/Hr) IV Continuous <Continuous>  dextrose 5%. 1000 milliLiter(s) (100 mL/Hr) IV Continuous <Continuous>  dextrose 50% Injectable 25 Gram(s) IV Push once  dextrose 50% Injectable 12.5 Gram(s) IV Push once  dextrose 50% Injectable 25 Gram(s) IV Push once  enoxaparin Injectable 40 milliGRAM(s) SubCutaneous every 12 hours  glucagon  Injectable 1 milliGRAM(s) IntraMuscular once  insulin lispro (ADMELOG) corrective regimen sliding scale   SubCutaneous every 6 hours  lidocaine   4% Patch 1 Patch Transdermal daily  mupirocin 2% Ointment 1 Application(s) Topical two times a day  remdesivir  IVPB   IV Intermittent   remdesivir  IVPB 100 milliGRAM(s) IV Intermittent every 24 hours  sodium chloride 0.45%. 1000 milliLiter(s) (100 mL/Hr) IV Continuous <Continuous>    MEDICATIONS  (PRN):  acetaminophen  Suppository .. 650 milliGRAM(s) Rectal every 6 hours PRN Temp greater or equal to 38.5C (101.3F), Mild Pain (1 - 3), Moderate Pain (4 - 6), Severe Pain (7 - 10)  ALBUTerol    90 MICROgram(s) HFA Inhaler 2 Puff(s) Inhalation every 4 hours PRN Shortness of Breath and/or Wheezing  ondansetron Injectable 4 milliGRAM(s) IV Push every 8 hours PRN Nausea and/or Vomiting        LABS                              153    |  113    |  37                  Calcium: 8.5   / iCa: x      (12-27 @ 07:23)    ----------------------------<  175       Magnesium: x                                3.7     |  27     |  0.65             Phosphorous: x        TPro  7.0    /  Alb  3.5    /  TBili  0.6    /  DBili  0.2    /  AST  84     /  ALT  58     /  AlkPhos  107    27 Dec 2021 07:20    ( 12-27 @ 07:20 )   PT: 11.9 sec;   INR: 1.04 ratio  aPTT: x            ASSESSMENT & PLAN:  62 y/o F, PMH of Polio (bedbound at baseline w/ chronic contractures), HTN, HLD, Type 2 DM (unknown if on insulin), presented to ED w/ c/o left hip pain and dry cough x 1 week, found to be Covid+ and have L hip dislocation for which ortho was consulted for which it was determined she needed no acute surgical intervention. Course complicated by persistent hypoxia in the setting of multifocal pneumonia 2/2 COVID and/or aspiration.     Neuro:  Baseline nonverbal but responds to commands and appears alert. Currently intubated and sedated.     Respiratory:   #Acute hypoxic respiratory failure-   -P/F 47 pre-intubation. Severe ARDS.   - s/p intubation, mechanical ventilation, follow up post tube ABG, CXR  -COVID +, was being treated with dexamethasone and remdesvir. Will continue in ICU  -Empirically tx for CAP w/ Azithromycin and ceftriaxone.   -Previous CT negative for PE, on Lovenox BID    Cardiac:   #HTN- Continue meds as tolerated  #Hyperlipidemia- Continue statin      GI:  No active issues. NPO for now, will start tube feeds in AM.     /Renal:    #Hypernatremia- Previously on D5NS, will reassess  #T2DM- Continue SSI in ICU    MSK:    #Polio- Chronic contractures. No intervention at this time. Follow-up nutrition recommendations.   #Chronic Hip dislocations- Ortho following. No acute surgical intervention needed. Will ctm.       Prophylaxis:  #Lovenox BID     GOC:   Will attempt to contact family and obtain code status. MICU Transfer Accept Note    Transfer from: Floors  Transfer to:  MICU      HOSPITAL COURSE:    60 y/o F, PMH of Polio (bedbound at baseline w/ chronic contractures), HTN, HLD, Type 2 DM (unknown if on insulin), presented to ED w/ c/o left hip pain and dry cough x 1 week.  Pt is non-verbal, unable to provide history of events surrounding her symptoms, attempted to pt's brother, Elizabeth Fatima @ [phone] for collateral information regarding the pt.  The phone goes straight to voicemail.  Chart checked, no medication lists, diet info, or supplemental historical documents are provided.  No medication rx's listed in outpatient medication record or in Surescripts.    All documentation here on will be referenced from ED provider note obtained during pt's ED visit.  Per ED note, pt's family reports she does her ADLs independently, pt reportedly slipped while crawling around the home causing a fall onto her left hip.  Pt also had non-productive cough and increased work of breathing for one week.  Noted to be satting 93% on 3L NC in ED while in EMS area, was hypoxic to 70s, and placed on NRB in ED.  She is not vaccinated against Covid.  End of referenced documentation.    Pt noted to have L hip dislocation on X-Ray in ED w/ possible R hip dislocation, ortho was called by ED.  Ortho called writer stating this is a surgical emergency requiring urgent CT scan of the bony pelvis, expedited CT scan of the bony pelvis.  Prelim results d/w ortho, they said there is no indication for acute surgical intervention at this time as the dislocations are likely chronic based on the findings.    While in ED, her vitals were T 97.8 (Tmax 100.4 rectal), P 104, /92, RR 26, O2 sat 93% NC 3L. Her lab work showed hypernatremia with elevated BUN/Cr ratio, her lactate was wnl, her UA showed some pyuria/hematuria with occ bacteria, and her RVP was positive for COVID19. Her CTA Chest showed no PE but showed patchy airspace opacities within all lobes of the lungs. Her CT Pelvis showed a chronic deformity of the L femoral head/acetabulum and pseudoarthrosis of the R lesser troch and acetabulum. She was given dexamethasone 6mg IVP and was admitted to medicine.     RRT was called due to patient's persistent hypoxia. She was placed on high flow nasal cannula with overlayed nonrebreather mask with saturations going up to 90% but subsequently desatted further to mid 70s. ABG showed P/F of 47. Decision was made to intubate the patient 2/2 ARDS and hypoxic respiratory failure.       OBJECTIVE:    Vital Signs Last 24 Hrs  T(C): 36.5 (27 Dec 2021 12:44), Max: 36.5 (27 Dec 2021 12:44)  T(F): 97.7 (27 Dec 2021 12:44), Max: 97.7 (27 Dec 2021 12:44)  HR: 105 (27 Dec 2021 17:00) (74 - 105)  BP: 150/82 (27 Dec 2021 12:54) (140/74 - 157/92)  BP(mean): --  RR: 20 (27 Dec 2021 17:00) (16 - 20)  SpO2: 90% (27 Dec 2021 17:00) (90% - 100%)  I&O's Summary    26 Dec 2021 07:01  -  27 Dec 2021 07:00  --------------------------------------------------------  IN: 1200 mL / OUT: 800 mL / NET: 400 mL    27 Dec 2021 07:01  -  27 Dec 2021 18:51  --------------------------------------------------------  IN: 0 mL / OUT: 400 mL / NET: -400 mL        PHYSICAL EXAM:  General: NAD, resting comfortably  HEENT: NC/AT, PERRLA, EOMI, oropharnyx clear, no LAD  CV: RRR, normal S1,S2; no murmurs; no peripheral edema   Pulm: lungs CTAB, no crackles, rhonchi, or wheezes  Abd: soft, non-tender, non-distended, normal bowel sounds  Psych/Neuro: A&Ox3, nonfocal, strength grossly intact, normal affect  Skin: warm, dry    MEDICATIONS  (STANDING):  azithromycin  IVPB      azithromycin  IVPB 500 milliGRAM(s) IV Intermittent once  cefTRIAXone   IVPB      chlorhexidine 2% Cloths 1 Application(s) Topical daily  dexAMETHasone  Injectable 6 milliGRAM(s) IV Push daily  dextrose 40% Gel 15 Gram(s) Oral once  dextrose 5%. 1000 milliLiter(s) (50 mL/Hr) IV Continuous <Continuous>  dextrose 5%. 1000 milliLiter(s) (100 mL/Hr) IV Continuous <Continuous>  dextrose 50% Injectable 25 Gram(s) IV Push once  dextrose 50% Injectable 12.5 Gram(s) IV Push once  dextrose 50% Injectable 25 Gram(s) IV Push once  enoxaparin Injectable 40 milliGRAM(s) SubCutaneous every 12 hours  glucagon  Injectable 1 milliGRAM(s) IntraMuscular once  insulin lispro (ADMELOG) corrective regimen sliding scale   SubCutaneous every 6 hours  lidocaine   4% Patch 1 Patch Transdermal daily  mupirocin 2% Ointment 1 Application(s) Topical two times a day  remdesivir  IVPB   IV Intermittent   remdesivir  IVPB 100 milliGRAM(s) IV Intermittent every 24 hours  sodium chloride 0.45%. 1000 milliLiter(s) (100 mL/Hr) IV Continuous <Continuous>    MEDICATIONS  (PRN):  acetaminophen  Suppository .. 650 milliGRAM(s) Rectal every 6 hours PRN Temp greater or equal to 38.5C (101.3F), Mild Pain (1 - 3), Moderate Pain (4 - 6), Severe Pain (7 - 10)  ALBUTerol    90 MICROgram(s) HFA Inhaler 2 Puff(s) Inhalation every 4 hours PRN Shortness of Breath and/or Wheezing  ondansetron Injectable 4 milliGRAM(s) IV Push every 8 hours PRN Nausea and/or Vomiting        LABS                              153    |  113    |  37                  Calcium: 8.5   / iCa: x      (12-27 @ 07:23)    ----------------------------<  175       Magnesium: x                                3.7     |  27     |  0.65             Phosphorous: x        TPro  7.0    /  Alb  3.5    /  TBili  0.6    /  DBili  0.2    /  AST  84     /  ALT  58     /  AlkPhos  107    27 Dec 2021 07:20    ( 12-27 @ 07:20 )   PT: 11.9 sec;   INR: 1.04 ratio  aPTT: x            ASSESSMENT & PLAN:  60 y/o F, PMH of Polio (bedbound at baseline w/ chronic contractures), HTN, HLD, Type 2 DM (unknown if on insulin), presented to ED w/ c/o left hip pain and dry cough x 1 week, found to be Covid+ and have L hip dislocation for which ortho was consulted for which it was determined she needed no acute surgical intervention. Course complicated by persistent hypoxia in the setting of multifocal pneumonia 2/2 COVID and/or aspiration.     Neuro:  Baseline nonverbal but responds to commands and appears alert. Currently intubated and sedated.     Respiratory:   #Acute hypoxic respiratory failure-   -P/F 47 pre-intubation. Severe ARDS.   - s/p intubation, mechanical ventilation, follow up post tube ABG, CXR  -COVID +, was being treated with dexamethasone and remdesvir. Will continue in ICU  -Empirically tx for CAP w/ Azithromycin.  -Previous CT negative for PE, on Lovenox BID    Cardiac:   #HTN- Continue meds as tolerated  #Hyperlipidemia- Continue statin      GI:  No active issues. NPO for now, will start tube feeds in AM.     /Renal:    #Urinary Retention- Positive UA, started on ceftriaxone. Follow up urine cx.   #Hypernatremia- Previously on D5NS, will reassess  #T2DM- Continue SSI in ICU    MSK:    #Polio- Chronic contractures. No intervention at this time. Follow-up nutrition recommendations.   #Chronic Hip dislocations- Ortho following. No acute surgical intervention needed. Will ctm.       Prophylaxis:  #Lovenox BID     GOC:   Will attempt to contact family and obtain code status. MICU Transfer Accept Note    Transfer from: Floors  Transfer to:  MICU      HOSPITAL COURSE:    60 y/o F, PMH of Polio (bedbound at baseline w/ chronic contractures), HTN, HLD, Type 2 DM (unknown if on insulin), presented to ED w/ c/o left hip pain and dry cough x 1 week.  Pt is non-verbal, unable to provide history of events surrounding her symptoms, attempted to pt's brother, Elizabeth Fatima @ [phone] for collateral information regarding the pt.  The phone goes straight to voicemail.  Chart checked, no medication lists, diet info, or supplemental historical documents are provided.  No medication rx's listed in outpatient medication record or in Surescripts.    All documentation here on will be referenced from ED provider note obtained during pt's ED visit.  Per ED note, pt's family reports she does her ADLs independently, pt reportedly slipped while crawling around the home causing a fall onto her left hip.  Pt also had non-productive cough and increased work of breathing for one week.  Noted to be satting 93% on 3L NC in ED while in EMS area, was hypoxic to 70s, and placed on NRB in ED.  She is not vaccinated against Covid.  End of referenced documentation.    Pt noted to have L hip dislocation on X-Ray in ED w/ possible R hip dislocation, ortho was called by ED.  Ortho called writer stating this is a surgical emergency requiring urgent CT scan of the bony pelvis, expedited CT scan of the bony pelvis.  Prelim results d/w ortho, they said there is no indication for acute surgical intervention at this time as the dislocations are likely chronic based on the findings.    While in ED, her vitals were T 97.8 (Tmax 100.4 rectal), P 104, /92, RR 26, O2 sat 93% NC 3L. Her lab work showed hypernatremia with elevated BUN/Cr ratio, her lactate was wnl, her UA showed some pyuria/hematuria with occ bacteria, and her RVP was positive for COVID19. Her CTA Chest showed no PE but showed patchy airspace opacities within all lobes of the lungs. Her CT Pelvis showed a chronic deformity of the L femoral head/acetabulum and pseudoarthrosis of the R lesser troch and acetabulum. She was given dexamethasone 6mg IVP and was admitted to medicine.     RRT was called due to patient's persistent hypoxia. She was placed on high flow nasal cannula with overlayed nonrebreather mask with saturations going up to 90% but subsequently desatted further to mid 70s. ABG showed P/F of 47. Decision was made to intubate the patient 2/2 ARDS and hypoxic respiratory failure.       OBJECTIVE:    Vital Signs Last 24 Hrs  T(C): 36.5 (27 Dec 2021 12:44), Max: 36.5 (27 Dec 2021 12:44)  T(F): 97.7 (27 Dec 2021 12:44), Max: 97.7 (27 Dec 2021 12:44)  HR: 105 (27 Dec 2021 17:00) (74 - 105)  BP: 150/82 (27 Dec 2021 12:54) (140/74 - 157/92)  BP(mean): --  RR: 20 (27 Dec 2021 17:00) (16 - 20)  SpO2: 90% (27 Dec 2021 17:00) (90% - 100%)  I&O's Summary    26 Dec 2021 07:01  -  27 Dec 2021 07:00  --------------------------------------------------------  IN: 1200 mL / OUT: 800 mL / NET: 400 mL    27 Dec 2021 07:01  -  27 Dec 2021 18:51  --------------------------------------------------------  IN: 0 mL / OUT: 400 mL / NET: -400 mL        PHYSICAL EXAM:  General: NAD, resting comfortably  HEENT: NC/AT, PERRLA, EOMI, oropharnyx clear, no LAD  CV: RRR, normal S1,S2; no murmurs; no peripheral edema   Pulm: lungs CTAB, no crackles, rhonchi, or wheezes  Abd: soft, non-tender, non-distended, normal bowel sounds  Psych/Neuro: A&Ox3, nonfocal, strength grossly intact, normal affect  Skin: warm, dry    MEDICATIONS  (STANDING):  azithromycin  IVPB      azithromycin  IVPB 500 milliGRAM(s) IV Intermittent once  cefTRIAXone   IVPB      chlorhexidine 2% Cloths 1 Application(s) Topical daily  dexAMETHasone  Injectable 6 milliGRAM(s) IV Push daily  dextrose 40% Gel 15 Gram(s) Oral once  dextrose 5%. 1000 milliLiter(s) (50 mL/Hr) IV Continuous <Continuous>  dextrose 5%. 1000 milliLiter(s) (100 mL/Hr) IV Continuous <Continuous>  dextrose 50% Injectable 25 Gram(s) IV Push once  dextrose 50% Injectable 12.5 Gram(s) IV Push once  dextrose 50% Injectable 25 Gram(s) IV Push once  enoxaparin Injectable 40 milliGRAM(s) SubCutaneous every 12 hours  glucagon  Injectable 1 milliGRAM(s) IntraMuscular once  insulin lispro (ADMELOG) corrective regimen sliding scale   SubCutaneous every 6 hours  lidocaine   4% Patch 1 Patch Transdermal daily  mupirocin 2% Ointment 1 Application(s) Topical two times a day  remdesivir  IVPB   IV Intermittent   remdesivir  IVPB 100 milliGRAM(s) IV Intermittent every 24 hours  sodium chloride 0.45%. 1000 milliLiter(s) (100 mL/Hr) IV Continuous <Continuous>    MEDICATIONS  (PRN):  acetaminophen  Suppository .. 650 milliGRAM(s) Rectal every 6 hours PRN Temp greater or equal to 38.5C (101.3F), Mild Pain (1 - 3), Moderate Pain (4 - 6), Severe Pain (7 - 10)  ALBUTerol    90 MICROgram(s) HFA Inhaler 2 Puff(s) Inhalation every 4 hours PRN Shortness of Breath and/or Wheezing  ondansetron Injectable 4 milliGRAM(s) IV Push every 8 hours PRN Nausea and/or Vomiting        LABS                              153    |  113    |  37                  Calcium: 8.5   / iCa: x      (12-27 @ 07:23)    ----------------------------<  175       Magnesium: x                                3.7     |  27     |  0.65             Phosphorous: x        TPro  7.0    /  Alb  3.5    /  TBili  0.6    /  DBili  0.2    /  AST  84     /  ALT  58     /  AlkPhos  107    27 Dec 2021 07:20    ( 12-27 @ 07:20 )   PT: 11.9 sec;   INR: 1.04 ratio  aPTT: x            ASSESSMENT & PLAN:  60 y/o F, PMH of Polio (bedbound at baseline w/ chronic contractures), HTN, HLD, Type 2 DM (unknown if on insulin), presented to ED w/ c/o left hip pain and dry cough x 1 week, found to be Covid+ and have L hip dislocation for which ortho was consulted for which it was determined she needed no acute surgical intervention. Course complicated by persistent hypoxia in the setting of multifocal pneumonia 2/2 COVID and/or aspiration.     Neuro:  Baseline nonverbal but responds to commands and appears alert. Currently intubated and sedated.     Respiratory:   #Acute hypoxic respiratory failure-   -P/F 47 pre-intubation. Severe ARDS.   - s/p intubation, mechanical ventilation, follow up post tube ABG, CXR  -COVID +, was being treated with dexamethasone and remdesvir. Will continue in ICU  -Empirically tx for CAP w/ Azithromycin.  -Previous CT negative for PE, on Lovenox BID    Cardiac:   #HTN- Continue meds as tolerated  #Hyperlipidemia- Continue statin      GI:  No active issues. NPO for now, will start tube feeds in AM.     /Renal:    #Urinary Retention- Positive UA, started on ceftriaxone. Follow up urine cx.   #Hypernatremia- Previously on D5NS, will reassess  #T2DM- Continue SSI in ICU    MSK:    #Polio- Chronic contractures. No intervention at this time. Follow-up nutrition recommendations.   #Chronic Hip dislocations- Ortho following. No acute surgical intervention needed. Will ctm.       Prophylaxis:  #Lovenox BID     GOC:   Will attempt to contact family and obtain code status.    Attending Attestation    60 yo woman with h/o polio, nonverbal and cannot walk but crawls at home and does own ADLs per family, unvaccinated against COVID, presents with COVID PNA positive test on 12/23.  Has received decadron and Remdesivir, progressed to intubation for hypoxic resp failure, now in MICU on 100% FiO2 and O2 sat 95%.  A/C 16/300/100/+5  Pplat 29  on no vasopressors and sedated with propofol.  CTA and LE DVT study negative for PE and DVTs but pt remained on full dose lovenox  Will continue lung protective vent settings, ABG, monitor lytes and glucose on steroid  Continue Remdes and decadron  Continue lovenox in ppx dose  Ongoing GOC discussions with family who were spoken to at length during RRT and wished pt to be full code  very guarded MICU Transfer Accept Note    Transfer from: Floors  Transfer to:  MICU      HOSPITAL COURSE:    60 y/o F, PMH of Polio (bedbound at baseline w/ chronic contractures), HTN, HLD, Type 2 DM (unknown if on insulin), presented to ED w/ c/o left hip pain and dry cough x 1 week.  Pt is non-verbal, unable to provide history of events surrounding her symptoms, attempted to pt's brother, Elizabeth Fatima @ [phone] for collateral information regarding the pt.  The phone goes straight to voicemail.  Chart checked, no medication lists, diet info, or supplemental historical documents are provided.  No medication rx's listed in outpatient medication record or in Surescripts.    All documentation here on will be referenced from ED provider note obtained during pt's ED visit.  Per ED note, pt's family reports she does her ADLs independently, pt reportedly slipped while crawling around the home causing a fall onto her left hip.  Pt also had non-productive cough and increased work of breathing for one week.  Noted to be satting 93% on 3L NC in ED while in EMS area, was hypoxic to 70s, and placed on NRB in ED.  She is not vaccinated against Covid.  End of referenced documentation.    Pt noted to have L hip dislocation on X-Ray in ED w/ possible R hip dislocation, ortho was called by ED.  Ortho called writer stating this is a surgical emergency requiring urgent CT scan of the bony pelvis, expedited CT scan of the bony pelvis.  Prelim results d/w ortho, they said there is no indication for acute surgical intervention at this time as the dislocations are likely chronic based on the findings.    While in ED, her vitals were T 97.8 (Tmax 100.4 rectal), P 104, /92, RR 26, O2 sat 93% NC 3L. Her lab work showed hypernatremia with elevated BUN/Cr ratio, her lactate was wnl, her UA showed some pyuria/hematuria with occ bacteria, and her RVP was positive for COVID19. Her CTA Chest showed no PE but showed patchy airspace opacities within all lobes of the lungs. Her CT Pelvis showed a chronic deformity of the L femoral head/acetabulum and pseudoarthrosis of the R lesser troch and acetabulum. She was given dexamethasone 6mg IVP and was admitted to medicine.     RRT was called due to patient's persistent hypoxia. She was placed on high flow nasal cannula with overlayed nonrebreather mask with saturations going up to 90% but subsequently desatted further to mid 70s. ABG showed P/F of 47. Decision was made to intubate the patient 2/2 ARDS and hypoxic respiratory failure.       OBJECTIVE:    Vital Signs Last 24 Hrs  T(C): 36.5 (27 Dec 2021 12:44), Max: 36.5 (27 Dec 2021 12:44)  T(F): 97.7 (27 Dec 2021 12:44), Max: 97.7 (27 Dec 2021 12:44)  HR: 105 (27 Dec 2021 17:00) (74 - 105)  BP: 150/82 (27 Dec 2021 12:54) (140/74 - 157/92)  BP(mean): --  RR: 20 (27 Dec 2021 17:00) (16 - 20)  SpO2: 90% (27 Dec 2021 17:00) (90% - 100%)  I&O's Summary    26 Dec 2021 07:01  -  27 Dec 2021 07:00  --------------------------------------------------------  IN: 1200 mL / OUT: 800 mL / NET: 400 mL    27 Dec 2021 07:01  -  27 Dec 2021 18:51  --------------------------------------------------------  IN: 0 mL / OUT: 400 mL / NET: -400 mL        PHYSICAL EXAM:  General: NAD, resting comfortably  HEENT: NC/AT, PERRLA, EOMI, oropharnyx clear, no LAD  CV: RRR, normal S1,S2; no murmurs; no peripheral edema   Pulm: lungs CTAB, no crackles, rhonchi, or wheezes  Abd: soft, non-tender, non-distended, normal bowel sounds  Psych/Neuro: A&Ox3, nonfocal, strength grossly intact, normal affect  Skin: warm, dry    MEDICATIONS  (STANDING):  azithromycin  IVPB      azithromycin  IVPB 500 milliGRAM(s) IV Intermittent once  cefTRIAXone   IVPB      chlorhexidine 2% Cloths 1 Application(s) Topical daily  dexAMETHasone  Injectable 6 milliGRAM(s) IV Push daily  dextrose 40% Gel 15 Gram(s) Oral once  dextrose 5%. 1000 milliLiter(s) (50 mL/Hr) IV Continuous <Continuous>  dextrose 5%. 1000 milliLiter(s) (100 mL/Hr) IV Continuous <Continuous>  dextrose 50% Injectable 25 Gram(s) IV Push once  dextrose 50% Injectable 12.5 Gram(s) IV Push once  dextrose 50% Injectable 25 Gram(s) IV Push once  enoxaparin Injectable 40 milliGRAM(s) SubCutaneous every 12 hours  glucagon  Injectable 1 milliGRAM(s) IntraMuscular once  insulin lispro (ADMELOG) corrective regimen sliding scale   SubCutaneous every 6 hours  lidocaine   4% Patch 1 Patch Transdermal daily  mupirocin 2% Ointment 1 Application(s) Topical two times a day  remdesivir  IVPB   IV Intermittent   remdesivir  IVPB 100 milliGRAM(s) IV Intermittent every 24 hours  sodium chloride 0.45%. 1000 milliLiter(s) (100 mL/Hr) IV Continuous <Continuous>    MEDICATIONS  (PRN):  acetaminophen  Suppository .. 650 milliGRAM(s) Rectal every 6 hours PRN Temp greater or equal to 38.5C (101.3F), Mild Pain (1 - 3), Moderate Pain (4 - 6), Severe Pain (7 - 10)  ALBUTerol    90 MICROgram(s) HFA Inhaler 2 Puff(s) Inhalation every 4 hours PRN Shortness of Breath and/or Wheezing  ondansetron Injectable 4 milliGRAM(s) IV Push every 8 hours PRN Nausea and/or Vomiting        LABS                              153    |  113    |  37                  Calcium: 8.5   / iCa: x      (12-27 @ 07:23)    ----------------------------<  175       Magnesium: x                                3.7     |  27     |  0.65             Phosphorous: x        TPro  7.0    /  Alb  3.5    /  TBili  0.6    /  DBili  0.2    /  AST  84     /  ALT  58     /  AlkPhos  107    27 Dec 2021 07:20    ( 12-27 @ 07:20 )   PT: 11.9 sec;   INR: 1.04 ratio  aPTT: x            ASSESSMENT & PLAN:  60 y/o F, PMH of Polio (bedbound at baseline w/ chronic contractures), HTN, HLD, Type 2 DM (unknown if on insulin), presented to ED w/ c/o left hip pain and dry cough x 1 week, found to be Covid+ and have L hip dislocation for which ortho was consulted for which it was determined she needed no acute surgical intervention. Course complicated by persistent hypoxia in the setting of multifocal pneumonia 2/2 COVID and/or aspiration.     Neuro:  Baseline nonverbal but responds to commands and appears alert. Currently intubated and sedated.     Respiratory:   #Acute hypoxic respiratory failure-   -P/F 47 pre-intubation. Severe ARDS.   - s/p intubation, mechanical ventilation, follow up post tube ABG, CXR  -COVID +, was being treated with dexamethasone and remdesvir. Will continue in ICU  -Empirically tx for CAP w/ Azithromycin.  -Previous CT negative for PE, on Lovenox BID    Cardiac:   #HTN- Continue meds as tolerated  #Hyperlipidemia- Continue statin      GI:  No active issues. NPO for now, will start tube feeds in AM.     /Renal:    #Urinary Retention- Positive UA, started on ceftriaxone. Follow up urine cx.   #Hypernatremia- Previously on D5NS, will reassess  #T2DM- Continue SSI in ICU    MSK:    #Polio- Chronic contractures. No intervention at this time. Follow-up nutrition recommendations.   #Chronic Hip dislocations- Ortho following. No acute surgical intervention needed. Will ctm.       Prophylaxis:  #Lovenox BID     GOC:   Will attempt to contact family and obtain code status.    Attending Attestation  I have personally examined pt and agree with above history PE assessment and plan except as noted below  62 yo woman with h/o polio, nonverbal and cannot walk but crawls at home and does own ADLs per family, unvaccinated against COVID, presents with COVID PNA positive test on 12/23.  Has received decadron and Remdesivir, progressed to intubation for hypoxic resp failure, now in MICU on 100% FiO2 and O2 sat 95%.  A/C 16/300/100/+5  Pplat 29  on no vasopressors and sedated with propofol.  CTA and LE DVT study negative for PE and DVTs but pt remained on full dose lovenox  Will continue lung protective vent settings, ABG, monitor lytes and glucose on steroid  Continue Remdes and decadron  Continue lovenox in ppx dose  Ongoing GOC discussions with family who were spoken to at length during RRT and wished pt to be full code  very guarded  Critical care services provided.     I have personally and independently provided 40 minutes of critical care services.  This excludes any time spent on separate procedures or teaching. MICU Transfer Accept Note    Transfer from: Floors  Transfer to:  MICU      HOSPITAL COURSE:    62 y/o F, PMH of Polio (bedbound at baseline w/ chronic contractures), HTN, HLD, Type 2 DM (unknown if on insulin), presented to ED w/ c/o left hip pain and dry cough x 1 week.  Pt is non-verbal, unable to provide history of events surrounding her symptoms, attempted to pt's brother, Elizabeth Fatima @ [phone] for collateral information regarding the pt.  The phone goes straight to voicemail.  Chart checked, no medication lists, diet info, or supplemental historical documents are provided.  No medication rx's listed in outpatient medication record or in Surescripts.    All documentation here on will be referenced from ED provider note obtained during pt's ED visit.  Per ED note, pt's family reports she does her ADLs independently, pt reportedly slipped while crawling around the home causing a fall onto her left hip.  Pt also had non-productive cough and increased work of breathing for one week.  Noted to be satting 93% on 3L NC in ED while in EMS area, was hypoxic to 70s, and placed on NRB in ED.  She is not vaccinated against Covid.  End of referenced documentation.    Pt noted to have L hip dislocation on X-Ray in ED w/ possible R hip dislocation, ortho was called by ED.  Ortho called writer stating this is a surgical emergency requiring urgent CT scan of the bony pelvis, expedited CT scan of the bony pelvis.  Prelim results d/w ortho, they said there is no indication for acute surgical intervention at this time as the dislocations are likely chronic based on the findings.    While in ED, her vitals were T 97.8 (Tmax 100.4 rectal), P 104, /92, RR 26, O2 sat 93% NC 3L. Her lab work showed hypernatremia with elevated BUN/Cr ratio, her lactate was wnl, her UA showed some pyuria/hematuria with occ bacteria, and her RVP was positive for COVID19. Her CTA Chest showed no PE but showed patchy airspace opacities within all lobes of the lungs. Her CT Pelvis showed a chronic deformity of the L femoral head/acetabulum and pseudoarthrosis of the R lesser troch and acetabulum. She was given dexamethasone 6mg IVP and was admitted to medicine.     RRT was called due to patient's persistent hypoxia. She was placed on high flow nasal cannula with overlayed nonrebreather mask with saturations going up to 90% but subsequently desatted further to mid 70s. ABG showed P/F of 47. Decision was made to intubate the patient 2/2 ARDS and hypoxic respiratory failure.       OBJECTIVE:    Vital Signs Last 24 Hrs  T(C): 36.5 (27 Dec 2021 12:44), Max: 36.5 (27 Dec 2021 12:44)  T(F): 97.7 (27 Dec 2021 12:44), Max: 97.7 (27 Dec 2021 12:44)  HR: 105 (27 Dec 2021 17:00) (74 - 105)  BP: 150/82 (27 Dec 2021 12:54) (140/74 - 157/92)  BP(mean): --  RR: 20 (27 Dec 2021 17:00) (16 - 20)  SpO2: 90% (27 Dec 2021 17:00) (90% - 100%)  I&O's Summary    26 Dec 2021 07:01  -  27 Dec 2021 07:00  --------------------------------------------------------  IN: 1200 mL / OUT: 800 mL / NET: 400 mL    27 Dec 2021 07:01  -  27 Dec 2021 18:51  --------------------------------------------------------  IN: 0 mL / OUT: 400 mL / NET: -400 mL        PHYSICAL EXAM:  General: NAD, resting comfortably  HEENT: NC/AT, PERRLA, EOMI, oropharnyx clear, no LAD  CV: RRR, normal S1,S2; no murmurs; no peripheral edema   Pulm: lungs CTAB, no crackles, rhonchi, or wheezes  Abd: soft, non-tender, non-distended, normal bowel sounds  Psych/Neuro: A&Ox3, nonfocal, strength grossly intact, normal affect  Skin: warm, dry    MEDICATIONS  (STANDING):  azithromycin  IVPB      azithromycin  IVPB 500 milliGRAM(s) IV Intermittent once  cefTRIAXone   IVPB      chlorhexidine 2% Cloths 1 Application(s) Topical daily  dexAMETHasone  Injectable 6 milliGRAM(s) IV Push daily  dextrose 40% Gel 15 Gram(s) Oral once  dextrose 5%. 1000 milliLiter(s) (50 mL/Hr) IV Continuous <Continuous>  dextrose 5%. 1000 milliLiter(s) (100 mL/Hr) IV Continuous <Continuous>  dextrose 50% Injectable 25 Gram(s) IV Push once  dextrose 50% Injectable 12.5 Gram(s) IV Push once  dextrose 50% Injectable 25 Gram(s) IV Push once  enoxaparin Injectable 40 milliGRAM(s) SubCutaneous every 12 hours  glucagon  Injectable 1 milliGRAM(s) IntraMuscular once  insulin lispro (ADMELOG) corrective regimen sliding scale   SubCutaneous every 6 hours  lidocaine   4% Patch 1 Patch Transdermal daily  mupirocin 2% Ointment 1 Application(s) Topical two times a day  remdesivir  IVPB   IV Intermittent   remdesivir  IVPB 100 milliGRAM(s) IV Intermittent every 24 hours  sodium chloride 0.45%. 1000 milliLiter(s) (100 mL/Hr) IV Continuous <Continuous>    MEDICATIONS  (PRN):  acetaminophen  Suppository .. 650 milliGRAM(s) Rectal every 6 hours PRN Temp greater or equal to 38.5C (101.3F), Mild Pain (1 - 3), Moderate Pain (4 - 6), Severe Pain (7 - 10)  ALBUTerol    90 MICROgram(s) HFA Inhaler 2 Puff(s) Inhalation every 4 hours PRN Shortness of Breath and/or Wheezing  ondansetron Injectable 4 milliGRAM(s) IV Push every 8 hours PRN Nausea and/or Vomiting        LABS                              153    |  113    |  37                  Calcium: 8.5   / iCa: x      (12-27 @ 07:23)    ----------------------------<  175       Magnesium: x                                3.7     |  27     |  0.65             Phosphorous: x        TPro  7.0    /  Alb  3.5    /  TBili  0.6    /  DBili  0.2    /  AST  84     /  ALT  58     /  AlkPhos  107    27 Dec 2021 07:20    ( 12-27 @ 07:20 )   PT: 11.9 sec;   INR: 1.04 ratio  aPTT: x            ASSESSMENT & PLAN:  62 y/o F, PMH of Polio (bedbound at baseline w/ chronic contractures), HTN, HLD, Type 2 DM (unknown if on insulin), presented to ED w/ c/o left hip pain and dry cough x 1 week, found to be Covid+ and have L hip dislocation for which ortho was consulted for which it was determined she needed no acute surgical intervention. Course complicated by persistent hypoxia in the setting of multifocal pneumonia 2/2 COVID and/or aspiration.     Neuro:  Baseline nonverbal but responds to commands and appears alert. Currently intubated and sedated.     Respiratory:   #Acute hypoxic respiratory failure-   -P/F 47 pre-intubation. Severe ARDS.   - s/p intubation, mechanical ventilation, follow up post tube ABG, CXR  -COVID +, was being treated with dexamethasone and remdesvir. Will continue in ICU  -Empirically tx for CAP w/ Azithromycin.  -Previous CT negative for PE, on Lovenox BID    Cardiac:   #HTN- Continue meds as tolerated  #Hyperlipidemia- Continue statin      GI:  No active issues. NPO for now, will start tube feeds in AM.     /Renal:    #Urinary Retention- Positive UA, started on ceftriaxone. Follow up urine cx.   #Hypernatremia- Previously on D5NS, will reassess  #T2DM- Continue SSI in ICU    MSK:    #Polio- Chronic contractures. No intervention at this time. Follow-up nutrition recommendations.   #Chronic Hip dislocations- Ortho following. No acute surgical intervention needed. Will ctm.       Prophylaxis:  #Lovenox BID     GOC:   Will attempt to contact family and obtain code status.    Attending Attestation  Attending supervision statement: I have personally seen and examined the patient.  I fully participated in the care of this patient.  I have made amendments to the documentation where necessary, and agree with the history, physical exam, and plan as documented by the Resident.     62 yo woman with h/o polio, nonverbal and cannot walk but crawls at home and does own ADLs per family, unvaccinated against COVID, presents with COVID PNA positive test on 12/23.  Has received decadron and Remdesivir, progressed to intubation for hypoxic resp failure, now in MICU on 100% FiO2 and O2 sat 95%.  A/C 16/300/100/+5  Pplat 29  on no vasopressors and sedated with propofol.  CTA and LE DVT study negative for PE and DVTs but pt remained on full dose lovenox  Will continue lung protective vent settings, ABG, monitor lytes and glucose on steroid  Continue Remdes and decadron  Continue lovenox in ppx dose  Ongoing GOC discussions with family who were spoken to at length during RRT and wished pt to be full code  very guarded  Critical care services provided.     I have personally and independently provided 40 minutes of critical care services.  This excludes any time spent on separate procedures or teaching.

## 2021-12-27 NOTE — PROGRESS NOTE ADULT - PROBLEM SELECTOR PLAN 5
- S+S eval, unknown what type of diet pt has at home  - Chronic contractures noted  - PT as above
- Na 155, has trended to 153 while on D5+1/2 NS @ 75 cc/hr. Na 153 today. Continue with fluids.   - Likely 2/2 poor PO intake  - Monitor Na, avoid correction of >8 in 24 hours

## 2021-12-27 NOTE — PHARMACOTHERAPY INTERVENTION NOTE - COMMENTS
Medication history is incomplete. Unable to verify patient's medication list with two sources. Medications verified with [facility] ([phone]). Please call [facility] [phone] if you have any questions.
Medication history is incomplete. Per emergency contact, patient fills medications at [facility] ([phone]). Pharmacy currently closed and will be contacted 12/27/21 for medication verification. Emergency contact unsure of patient's home medications. Please call [facility] [phone] if you have any questions.

## 2021-12-27 NOTE — RAPID RESPONSE TEAM SUMMARY - NSSITUATIONBACKGROUNDRRT_GEN_ALL_CORE
62 y/o F, PMH of Polio (bedbound at baseline w/ chronic contractures), HTN, HLD, Type 2 DM (unknown if on insulin), presented to ED w/ c/o left hip pain and dry cough x 1 week, found to be Covid+ and have L hip dislocation for which ortho was consulted.    RRT called for acutely worsening hypoxia and AMS. At baseline, pt non-verbal but was able to follow some basic commands (e.g. turn to one side). Currently receiving Remdesivir and dexamethasone. This AM SpO2 was % on just NRB. During the afternoon, switched to HFNC titrated up to 60 LPM and FiO2 100%. Just prior to RRT, NRB placed on top of HFNC, SpO2 still 85-87% with good waveform. Unable to prone given contraction of the legs. Had extensive GOC discussion with pt's brother (next-of-kin); explained that pt will have a prolonged course on the ventilator if intubated and will very likely die on the ventilator. Also discussed resuscitation measures including chest compressions (breaking ribs), defibrillation, meds, and poor prognosis even if ROSC obtained. Pt's brother stated that pt had never discussed these things before. His wishes are that if anything can be done for the patient, then he wants it done. Anesthesia called to intubate. Initially hypotensive from etomidate and rocuronium, started pressure bag NS 1L bolus but SBP came back up to 120s. Propofol gtt started at 20 with Kurt gtt ready to start. Transferred to MICU.

## 2021-12-27 NOTE — SWALLOW BEDSIDE ASSESSMENT ADULT - COMMENTS
Per progress note 12/26/21, patient is a "62 y/o F, PMH of Polio (bedbound at baseline w/ chronic contractures), HTN, HLD, Type 2 DM (unknown if on insulin), presented to ED w/ c/o left hip pain and dry cough x 1 week, found to be Covid+ and have L hip dislocation for which ortho was consulted."    WBC is WFL. Most recent CXR revealed "Patchy opacities throughout the right lung."    Patient attempted to be seen this AM for an initial assessment of the swallow function, however, patient receiving nursing care at this time. This department to follow up as schedule permits.
Per progress note 12/26/21, patient is a "62 y/o F, PMH of Polio (bedbound at baseline w/ chronic contractures), HTN, HLD, Type 2 DM (unknown if on insulin), presented to ED w/ c/o left hip pain and dry cough x 1 week, found to be Covid+ and have L hip dislocation for which ortho was consulted."    WBC is WFL. Most recent CXR revealed "Patchy opacities throughout the right lung."    Patient attempted to be seen this AM, however, patient receiving nursing care at that time. Patient able to be seen this afternoon for an initial assessment of the swallow function, at which time patient was alert. RN reporting patient is nonverbal, however, able to gesture yes/no. Patient currently receiving supplemental O2 via nasal cannula.

## 2021-12-27 NOTE — RAPID RESPONSE TEAM SUMMARY - NSMEDICATIONSRRT_GEN_ALL_CORE
Etomidate 6mg x1   Rocuronium 50mg x1  NS 1L bolus briefly started then stopped  Propofol gtt started at 20  Kurt available but not started

## 2021-12-27 NOTE — PROGRESS NOTE ADULT - PROBLEM SELECTOR PLAN 6
- Med rec needed, no medication h/o provided in ED, no paperwork left by family  - monitor BP   - NPO pending S+S
- S+S eval, unknown what type of diet pt has at home  - Chronic contractures noted  - PT as above

## 2021-12-27 NOTE — PROGRESS NOTE ADULT - PROBLEM SELECTOR PLAN 2
- 2/2 to Covid PNA above however CT scan showing multifocal PNA. Will treat for CAP with ceftriaxone and azithromycin.
- 2/2 to Covid PNA above  - Plan as above

## 2021-12-27 NOTE — SWALLOW BEDSIDE ASSESSMENT ADULT - SWALLOW EVAL: DIAGNOSIS
Patient presented with PO trials of puree and thin liquids, however, patient refused by shaking head "no", maintaining a tight labial seal and waving hand in front of oral cavity, despite maximum encouragement given by clinician. Oral/pharyngeal phase of swallow could not be adequately assessed

## 2021-12-27 NOTE — PROGRESS NOTE ADULT - SUBJECTIVE AND OBJECTIVE BOX
Anesthesia called for emergency intubation.  On arrival pt induced Etomidate 6 mg and Rocuronium 50 mg IV pushed, bag mask ventilation with 100% FiO2 being performed.  DL x 1 with Glyde 3 blade, grade 1 view, 7.0 cuffed ETT passed without trauma, + ETCO2 via EasyCap, + BBS, taped at 20 cm, teeth intact, no complications.

## 2021-12-27 NOTE — PROGRESS NOTE ADULT - PROBLEM SELECTOR PLAN 3
- Likely chronic per CT scan and Orthopedic recs given h/o polio and chronic contractures  - No acute surgical intervention is needed per d/w Orthopedics team, recs appreciated  - WBAT BLE  - Pain control as needed  - Need collateral information on events surrounding pt's hip pain at home  - PT Eval  - Social Work needed
Pt requiring straight catheterization 2/2 urinary retention. UA positive. Possibly 2/2 UTI.   - Start ceftriaxone 3 day course.   - F/u UCx   - If no improvement, will need [place].

## 2021-12-27 NOTE — PROGRESS NOTE ADULT - PROBLEM SELECTOR PLAN 1
- Pt currently requiring supplemental oxygen via NRB, titrated down to 6L NC overnight, now satting 95% on 6l NC   - c/w Dexamethasone and Remdesivir   - Inflammatory markers q72hrs   - Monitor SP02 on continuous pulse ox  - CTA Chest completed, negative for PE  - Check US venous duplex b/l LE given her elevated d-dimer  - D-Dimer > 2x ULN, will place on Lovenox 1mg/kg BID as per COVID protocol  - Has mild transaminitis, likely 2/2 COVID, would trend LFTs for now  - Prone positioning, Incentive spirometry and Chest PT PRN.

## 2021-12-27 NOTE — PROGRESS NOTE ADULT - PROBLEM SELECTOR PLAN 8
- Med rec as above  - Lipid profile   - NPO pending S+S as above
- Hold oral DM meds while inpatient   - A1C = 7.5%  - ISS - on q6h while NPO  - monitor FS  - Will need diabetic diet once recommendations determined from S+S

## 2021-12-27 NOTE — PROGRESS NOTE ADULT - PROBLEM SELECTOR PLAN 10
- DVT PPx w/ Lovenox 1mg/kg BID given her elevated d-dimer and COVID19+ results
- Email med rec, unknown what medications pt takes and unable to reach family for collateral

## 2021-12-28 NOTE — PROGRESS NOTE ADULT - SUBJECTIVE AND OBJECTIVE BOX
INTERVAL HPI/OVERNIGHT EVENTS:    SUBJECTIVE: Patient seen and examined at bedside.     OBJECTIVE:    VITAL SIGNS:  ICU Vital Signs Last 24 Hrs  T(C): 36.8 (27 Dec 2021 19:12), Max: 36.8 (27 Dec 2021 19:12)  T(F): 98.3 (27 Dec 2021 19:12), Max: 98.3 (27 Dec 2021 19:12)  HR: 64 (28 Dec 2021 07:00) (64 - 124)  BP: 99/73 (28 Dec 2021 04:00) (99/73 - 150/82)  BP(mean): 83 (28 Dec 2021 04:00) (83 - 128)  ABP: 96/60 (28 Dec 2021 07:00) (91/56 - 166/113)  ABP(mean): 75 (28 Dec 2021 07:00) (70 - 134)  RR: 16 (28 Dec 2021 07:00) (16 - 24)  SpO2: 96% (28 Dec 2021 07:00) (89% - 100%)    Mode: AC/ CMV (Assist Control/ Continuous Mandatory Ventilation), RR (machine): 16, TV (machine): 300, FiO2: 80, PEEP: 5, ITime: 0.75, MAP: 11, PIP: 36    12- @ 07:01  -  12- @ 07:00  --------------------------------------------------------  IN: 1272.7 mL / OUT: 890 mL / NET: 382.7 mL      CAPILLARY BLOOD GLUCOSE      POCT Blood Glucose.: 207 mg/dL (28 Dec 2021 06:04)      PHYSICAL EXAM:    General: NAD  HEENT: NC/AT; PERRL, clear conjunctiva  Neck: supple  Respiratory: CTA b/l  Cardiovascular: +S1/S2; RRR  Abdomen: soft, NT/ND; +BS x4  Extremities: WWP, 2+ peripheral pulses b/l; no LE edema  Skin: normal color and turgor; no rash  Neurological:    MEDICATIONS:  MEDICATIONS  (STANDING):  azithromycin  IVPB      azithromycin  IVPB 500 milliGRAM(s) IV Intermittent every 24 hours  cefTRIAXone   IVPB 1000 milliGRAM(s) IV Intermittent every 24 hours  cefTRIAXone   IVPB      chlorhexidine 0.12% Liquid 15 milliLiter(s) Oral Mucosa every 12 hours  chlorhexidine 2% Cloths 1 Application(s) Topical daily  chlorhexidine 4% Liquid 1 Application(s) Topical <User Schedule>  dexAMETHasone  Injectable 6 milliGRAM(s) IV Push daily  dextrose 40% Gel 15 Gram(s) Oral once  dextrose 5%. 1000 milliLiter(s) (50 mL/Hr) IV Continuous <Continuous>  dextrose 5%. 1000 milliLiter(s) (100 mL/Hr) IV Continuous <Continuous>  dextrose 50% Injectable 25 Gram(s) IV Push once  dextrose 50% Injectable 12.5 Gram(s) IV Push once  dextrose 50% Injectable 25 Gram(s) IV Push once  enoxaparin Injectable 40 milliGRAM(s) SubCutaneous every 24 hours  fentaNYL   Infusion. 0.5 MICROgram(s)/kG/Hr (2.09 mL/Hr) IV Continuous <Continuous>  glucagon  Injectable 1 milliGRAM(s) IntraMuscular once  insulin lispro (ADMELOG) corrective regimen sliding scale   SubCutaneous every 6 hours  lidocaine   4% Patch 1 Patch Transdermal daily  mupirocin 2% Ointment 1 Application(s) Topical two times a day  propofol Infusion 30 MICROgram(s)/kG/Min (7.52 mL/Hr) IV Continuous <Continuous>  remdesivir  IVPB   IV Intermittent   remdesivir  IVPB 100 milliGRAM(s) IV Intermittent every 24 hours  sodium chloride 0.45%. 1000 milliLiter(s) (100 mL/Hr) IV Continuous <Continuous>  sodium chloride 0.45%. 1000 milliLiter(s) (100 mL/Hr) IV Continuous <Continuous>    MEDICATIONS  (PRN):  acetaminophen  Suppository .. 650 milliGRAM(s) Rectal every 6 hours PRN Temp greater or equal to 38.5C (101.3F), Mild Pain (1 - 3), Moderate Pain (4 - 6), Severe Pain (7 - 10)  ALBUTerol    90 MICROgram(s) HFA Inhaler 2 Puff(s) Inhalation every 4 hours PRN Shortness of Breath and/or Wheezing  ondansetron Injectable 4 milliGRAM(s) IV Push every 8 hours PRN Nausea and/or Vomiting      ALLERGIES:  Allergies    No Known Allergies    Intolerances        LABS:                        10.5   21.31 )-----------( 247      ( 28 Dec 2021 06:21 )             33.4     Hemoglobin: 10.5 g/dL ( @ 06:21)  Hemoglobin: 12.5 g/dL ( @ 21:50)  Hemoglobin: 12.4 g/dL ( @ 19:01)  Hemoglobin: 11.6 g/dL ( @ 04:09)  Hemoglobin: 12.6 g/dL ( @ 14:57)    CBC Full  -  ( 28 Dec 2021 06:21 )  WBC Count : 21.31 K/uL  RBC Count : 3.81 M/uL  Hemoglobin : 10.5 g/dL  Hematocrit : 33.4 %  Platelet Count - Automated : 247 K/uL  Mean Cell Volume : 87.7 fL  Mean Cell Hemoglobin : 27.6 pg  Mean Cell Hemoglobin Concentration : 31.4 gm/dL  Auto Neutrophil # : 19.57 K/uL  Auto Lymphocyte # : 0.75 K/uL  Auto Monocyte # : 0.73 K/uL  Auto Eosinophil # : 0.00 K/uL  Auto Basophil # : 0.02 K/uL  Auto Neutrophil % : 91.9 %  Auto Lymphocyte % : 3.5 %  Auto Monocyte % : 3.4 %  Auto Eosinophil % : 0.0 %  Auto Basophil % : 0.1 %        150<H>  |  115<H>  |  43<H>  ----------------------------<  227<H>  3.6   |  26  |  0.68    Ca    8.2<L>      28 Dec 2021 06:21  Phos  3.5       Mg     2.50         TPro  6.1  /  Alb  3.4  /  TBili  0.9  /  DBili  x   /  AST  97<H>  /  ALT  53<H>  /  AlkPhos  97      Creatinine Trend: 0.68<--, 0.67<--, 0.67<--, 0.68<--, 0.65<--, 0.65<--  LIVER FUNCTIONS - ( 28 Dec 2021 06:21 )  Alb: 3.4 g/dL / Pro: 6.1 g/dL / ALK PHOS: 97 U/L / ALT: 53 U/L / AST: 97 U/L / GGT: x           PT/INR - ( 28 Dec 2021 01:22 )   PT: 14.5 sec;   INR: 1.28 ratio         PTT - ( 27 Dec 2021 19:01 )  PTT:37.0 sec    hs Troponin:    ABG - ( 28 Dec 2021 06:21 )  pH, Arterial: 7.37  pH, Blood: x     /  pCO2: 44    /  pO2: 63    / HCO3: 25    / Base Excess: -0.1  /  SaO2: 88.9                06:21 - ABG - pH: 7.37  |  pCO2: 44    |  pO2: 63    | Lactate:       | BE: -0.1   01:22 - ABG - pH: 7.44  |  pCO2: 40    |  pO2: 99    | Lactate:       | BE: 2.8    21:50 - ABG - pH: 7.39  |  pCO2: 47    |  pO2: 62    | Lactate:       | BE: 2.8    19:01 - ABG - pH: 7.35  |  pCO2: 52    |  pO2: 25    | Lactate:       | BE: 2.2    17:52 - VBG - pH: 7.38  | pCO2: 49    | pO2: 47    | Lactate: 2.7        Urinalysis Basic - ( 27 Dec 2021 21:50 )    Color: Yellow / Appearance: Clear / S.023 / pH: x  Gluc: x / Ketone: Negative  / Bili: Negative / Urobili: <2 mg/dL   Blood: x / Protein: 30 mg/dL / Nitrite: Negative   Leuk Esterase: Negative / RBC: 4 /HPF / WBC 2 /HPF   Sq Epi: x / Non Sq Epi: 4 /HPF / Bacteria: Negative      CSF:                      EKG:   MICROBIOLOGY:    Culture - Urine (collected 25 Dec 2021 18:30)  Source: Clean Catch Clean Catch (Midstream)  Final Report (27 Dec 2021 08:07):    <10,000 CFU/mL Normal Urogenital Lydia      IMAGING:      Labs, imaging, EKG personally reviewed    RADIOLOGY & ADDITIONAL TESTS: Reviewed.     INTERVAL HPI/OVERNIGHT EVENTS: Patient was started on fent overnight    SUBJECTIVE: Patient seen and examined at bedside. Sedated, intubated, does not withdraw to pain. Later in the morning patient was noted to be bradycardic w/ new pressor requirement. Levo started and rapidly uptitrated to 0.22, due to bradycardia, prop was discontinued, fent uptitrated and versed added as patient was overbreathing vent. Patient's HR improved to 70s.     OBJECTIVE:    VITAL SIGNS:  ICU Vital Signs Last 24 Hrs  T(C): 36.8 (27 Dec 2021 19:12), Max: 36.8 (27 Dec 2021 19:12)  T(F): 98.3 (27 Dec 2021 19:12), Max: 98.3 (27 Dec 2021 19:12)  HR: 64 (28 Dec 2021 07:00) (64 - 124)  BP: 99/73 (28 Dec 2021 04:00) (99/73 - 150/82)  BP(mean): 83 (28 Dec 2021 04:00) (83 - 128)  ABP: 96/60 (28 Dec 2021 07:00) (91/56 - 166/113)  ABP(mean): 75 (28 Dec 2021 07:00) (70 - 134)  RR: 16 (28 Dec 2021 07:00) (16 - 24)  SpO2: 96% (28 Dec 2021 07:00) (89% - 100%)    Mode: AC/ CMV (Assist Control/ Continuous Mandatory Ventilation), RR (machine): 16, TV (machine): 300, FiO2: 80, PEEP: 5, ITime: 0.75, MAP: 11, PIP: 36     @ 07:01  -   @ 07:00  --------------------------------------------------------  IN: 1272.7 mL / OUT: 890 mL / NET: 382.7 mL      CAPILLARY BLOOD GLUCOSE      POCT Blood Glucose.: 207 mg/dL (28 Dec 2021 06:04)      PHYSICAL EXAM:    General: sedated, intubated, contractures  HEENT: NC/AT; PERRL, clear conjunctiva  Neck: supple  Respiratory: CTA b/l  Cardiovascular: +S1/S2; RRR  Abdomen: soft, NT/ND; +BS x4  Extremities: WWP, 2+ peripheral pulses b/l; no LE edema, notable muscle wasting  Skin: normal color and turgor; no rash  Neurological: unable to assess    MEDICATIONS:  MEDICATIONS  (STANDING):  azithromycin  IVPB      azithromycin  IVPB 500 milliGRAM(s) IV Intermittent every 24 hours  cefTRIAXone   IVPB 1000 milliGRAM(s) IV Intermittent every 24 hours  cefTRIAXone   IVPB      chlorhexidine 0.12% Liquid 15 milliLiter(s) Oral Mucosa every 12 hours  chlorhexidine 2% Cloths 1 Application(s) Topical daily  chlorhexidine 4% Liquid 1 Application(s) Topical <User Schedule>  dexAMETHasone  Injectable 6 milliGRAM(s) IV Push daily  dextrose 40% Gel 15 Gram(s) Oral once  dextrose 5%. 1000 milliLiter(s) (50 mL/Hr) IV Continuous <Continuous>  dextrose 5%. 1000 milliLiter(s) (100 mL/Hr) IV Continuous <Continuous>  dextrose 50% Injectable 25 Gram(s) IV Push once  dextrose 50% Injectable 12.5 Gram(s) IV Push once  dextrose 50% Injectable 25 Gram(s) IV Push once  enoxaparin Injectable 40 milliGRAM(s) SubCutaneous every 24 hours  fentaNYL   Infusion. 0.5 MICROgram(s)/kG/Hr (2.09 mL/Hr) IV Continuous <Continuous>  glucagon  Injectable 1 milliGRAM(s) IntraMuscular once  insulin lispro (ADMELOG) corrective regimen sliding scale   SubCutaneous every 6 hours  lidocaine   4% Patch 1 Patch Transdermal daily  mupirocin 2% Ointment 1 Application(s) Topical two times a day  propofol Infusion 30 MICROgram(s)/kG/Min (7.52 mL/Hr) IV Continuous <Continuous>  remdesivir  IVPB   IV Intermittent   remdesivir  IVPB 100 milliGRAM(s) IV Intermittent every 24 hours  sodium chloride 0.45%. 1000 milliLiter(s) (100 mL/Hr) IV Continuous <Continuous>  sodium chloride 0.45%. 1000 milliLiter(s) (100 mL/Hr) IV Continuous <Continuous>    MEDICATIONS  (PRN):  acetaminophen  Suppository .. 650 milliGRAM(s) Rectal every 6 hours PRN Temp greater or equal to 38.5C (101.3F), Mild Pain (1 - 3), Moderate Pain (4 - 6), Severe Pain (7 - 10)  ALBUTerol    90 MICROgram(s) HFA Inhaler 2 Puff(s) Inhalation every 4 hours PRN Shortness of Breath and/or Wheezing  ondansetron Injectable 4 milliGRAM(s) IV Push every 8 hours PRN Nausea and/or Vomiting      ALLERGIES:  Allergies    No Known Allergies    Intolerances        LABS:                        10.5   21.31 )-----------( 247      ( 28 Dec 2021 06:21 )             33.4     Hemoglobin: 10.5 g/dL ( @ 06:21)  Hemoglobin: 12.5 g/dL ( @ 21:50)  Hemoglobin: 12.4 g/dL ( @ 19:01)  Hemoglobin: 11.6 g/dL ( @ 04:09)  Hemoglobin: 12.6 g/dL ( @ 14:57)    CBC Full  -  ( 28 Dec 2021 06:21 )  WBC Count : 21.31 K/uL  RBC Count : 3.81 M/uL  Hemoglobin : 10.5 g/dL  Hematocrit : 33.4 %  Platelet Count - Automated : 247 K/uL  Mean Cell Volume : 87.7 fL  Mean Cell Hemoglobin : 27.6 pg  Mean Cell Hemoglobin Concentration : 31.4 gm/dL  Auto Neutrophil # : 19.57 K/uL  Auto Lymphocyte # : 0.75 K/uL  Auto Monocyte # : 0.73 K/uL  Auto Eosinophil # : 0.00 K/uL  Auto Basophil # : 0.02 K/uL  Auto Neutrophil % : 91.9 %  Auto Lymphocyte % : 3.5 %  Auto Monocyte % : 3.4 %  Auto Eosinophil % : 0.0 %  Auto Basophil % : 0.1 %        150<H>  |  115<H>  |  43<H>  ----------------------------<  227<H>  3.6   |  26  |  0.68    Ca    8.2<L>      28 Dec 2021 06:21  Phos  3.5       Mg     2.50         TPro  6.1  /  Alb  3.4  /  TBili  0.9  /  DBili  x   /  AST  97<H>  /  ALT  53<H>  /  AlkPhos  97      Creatinine Trend: 0.68<--, 0.67<--, 0.67<--, 0.68<--, 0.65<--, 0.65<--  LIVER FUNCTIONS - ( 28 Dec 2021 06:21 )  Alb: 3.4 g/dL / Pro: 6.1 g/dL / ALK PHOS: 97 U/L / ALT: 53 U/L / AST: 97 U/L / GGT: x           PT/INR - ( 28 Dec 2021 01:22 )   PT: 14.5 sec;   INR: 1.28 ratio         PTT - ( 27 Dec 2021 19:01 )  PTT:37.0 sec    hs Troponin:    ABG - ( 28 Dec 2021 06:21 )  pH, Arterial: 7.37  pH, Blood: x     /  pCO2: 44    /  pO2: 63    / HCO3: 25    / Base Excess: -0.1  /  SaO2: 88.9                06:21 - ABG - pH: 7.37  |  pCO2: 44    |  pO2: 63    | Lactate:       | BE: -0.1   01:22 - ABG - pH: 7.44  |  pCO2: 40    |  pO2: 99    | Lactate:       | BE: 2.8    21:50 - ABG - pH: 7.39  |  pCO2: 47    |  pO2: 62    | Lactate:       | BE: 2.8    19:01 - ABG - pH: 7.35  |  pCO2: 52    |  pO2: 25    | Lactate:       | BE: 2.2    17:52 - VBG - pH: 7.38  | pCO2: 49    | pO2: 47    | Lactate: 2.7        Urinalysis Basic - ( 27 Dec 2021 21:50 )    Color: Yellow / Appearance: Clear / S.023 / pH: x  Gluc: x / Ketone: Negative  / Bili: Negative / Urobili: <2 mg/dL   Blood: x / Protein: 30 mg/dL / Nitrite: Negative   Leuk Esterase: Negative / RBC: 4 /HPF / WBC 2 /HPF   Sq Epi: x / Non Sq Epi: 4 /HPF / Bacteria: Negative      CSF:                      EKG:   MICROBIOLOGY:    Culture - Urine (collected 25 Dec 2021 18:30)  Source: Clean Catch Clean Catch (Midstream)  Final Report (27 Dec 2021 08:07):    <10,000 CFU/mL Normal Urogenital Lydia      IMAGING:      Labs, imaging, EKG personally reviewed    RADIOLOGY & ADDITIONAL TESTS: Reviewed.     INTERVAL HPI/OVERNIGHT EVENTS: Patient was started on fent overnight    SUBJECTIVE: Patient seen and examined at bedside. Sedated, intubated, does not withdraw to pain. Later in the morning patient was noted to be bradycardic w/ new pressor requirement. Levo started and rapidly uptitrated to 0.22, due to bradycardia, prop was discontinued, fent uptitrated and versed added as patient was overbreathing vent. Patient's HR improved to 70s.     OBJECTIVE:    VITAL SIGNS:  ICU Vital Signs Last 24 Hrs  T(C): 36.8 (27 Dec 2021 19:12), Max: 36.8 (27 Dec 2021 19:12)  T(F): 98.3 (27 Dec 2021 19:12), Max: 98.3 (27 Dec 2021 19:12)  HR: 64 (28 Dec 2021 07:00) (64 - 124)  BP: 99/73 (28 Dec 2021 04:00) (99/73 - 150/82)  BP(mean): 83 (28 Dec 2021 04:00) (83 - 128)  ABP: 96/60 (28 Dec 2021 07:00) (91/56 - 166/113)  ABP(mean): 75 (28 Dec 2021 07:00) (70 - 134)  RR: 16 (28 Dec 2021 07:00) (16 - 24)  SpO2: 96% (28 Dec 2021 07:00) (89% - 100%)    Mode: AC/ CMV (Assist Control/ Continuous Mandatory Ventilation), RR (machine): 16, TV (machine): 300, FiO2: 80, PEEP: 5, ITime: 0.75, MAP: 11, PIP: 36     @ 07:01  -   @ 07:00  --------------------------------------------------------  IN: 1272.7 mL / OUT: 890 mL / NET: 382.7 mL      CAPILLARY BLOOD GLUCOSE      POCT Blood Glucose.: 207 mg/dL (28 Dec 2021 06:04)      PHYSICAL EXAM:    General: sedated, intubated, contractures  HEENT: NC/AT; PERRL, clear conjunctiva  Neck: supple  Respiratory: CTA b/l  Cardiovascular: +S1/S2; RRR  Abdomen: soft, NT/ND; +BS x4  Extremities: WWP, 2+ peripheral pulses b/l; no LE edema, notable muscle wasting  Skin: normal color and turgor; no rash  Neurological: unable to assess    MEDICATIONS:  MEDICATIONS  (STANDING):  azithromycin  IVPB      azithromycin  IVPB 500 milliGRAM(s) IV Intermittent every 24 hours  cefTRIAXone   IVPB 1000 milliGRAM(s) IV Intermittent every 24 hours  cefTRIAXone   IVPB      chlorhexidine 0.12% Liquid 15 milliLiter(s) Oral Mucosa every 12 hours  chlorhexidine 2% Cloths 1 Application(s) Topical daily  chlorhexidine 4% Liquid 1 Application(s) Topical <User Schedule>  dexAMETHasone  Injectable 6 milliGRAM(s) IV Push daily  dextrose 40% Gel 15 Gram(s) Oral once  dextrose 5%. 1000 milliLiter(s) (50 mL/Hr) IV Continuous <Continuous>  dextrose 5%. 1000 milliLiter(s) (100 mL/Hr) IV Continuous <Continuous>  dextrose 50% Injectable 25 Gram(s) IV Push once  dextrose 50% Injectable 12.5 Gram(s) IV Push once  dextrose 50% Injectable 25 Gram(s) IV Push once  enoxaparin Injectable 40 milliGRAM(s) SubCutaneous every 24 hours  fentaNYL   Infusion. 0.5 MICROgram(s)/kG/Hr (2.09 mL/Hr) IV Continuous <Continuous>  glucagon  Injectable 1 milliGRAM(s) IntraMuscular once  insulin lispro (ADMELOG) corrective regimen sliding scale   SubCutaneous every 6 hours  lidocaine   4% Patch 1 Patch Transdermal daily  mupirocin 2% Ointment 1 Application(s) Topical two times a day  propofol Infusion 30 MICROgram(s)/kG/Min (7.52 mL/Hr) IV Continuous <Continuous>  remdesivir  IVPB   IV Intermittent   remdesivir  IVPB 100 milliGRAM(s) IV Intermittent every 24 hours  sodium chloride 0.45%. 1000 milliLiter(s) (100 mL/Hr) IV Continuous <Continuous>  sodium chloride 0.45%. 1000 milliLiter(s) (100 mL/Hr) IV Continuous <Continuous>    MEDICATIONS  (PRN):  acetaminophen  Suppository .. 650 milliGRAM(s) Rectal every 6 hours PRN Temp greater or equal to 38.5C (101.3F), Mild Pain (1 - 3), Moderate Pain (4 - 6), Severe Pain (7 - 10)  ALBUTerol    90 MICROgram(s) HFA Inhaler 2 Puff(s) Inhalation every 4 hours PRN Shortness of Breath and/or Wheezing  ondansetron Injectable 4 milliGRAM(s) IV Push every 8 hours PRN Nausea and/or Vomiting      ALLERGIES:  Allergies    No Known Allergies    Intolerances        LABS:                        10.5   21.31 )-----------( 247      ( 28 Dec 2021 06:21 )             33.4     Hemoglobin: 10.5 g/dL ( @ 06:21)  Hemoglobin: 12.5 g/dL ( @ 21:50)  Hemoglobin: 12.4 g/dL ( @ 19:01)  Hemoglobin: 11.6 g/dL ( @ 04:09)  Hemoglobin: 12.6 g/dL ( @ 14:57)    CBC Full  -  ( 28 Dec 2021 06:21 )  WBC Count : 21.31 K/uL  RBC Count : 3.81 M/uL  Hemoglobin : 10.5 g/dL  Hematocrit : 33.4 %  Platelet Count - Automated : 247 K/uL  Mean Cell Volume : 87.7 fL  Mean Cell Hemoglobin : 27.6 pg  Mean Cell Hemoglobin Concentration : 31.4 gm/dL  Auto Neutrophil # : 19.57 K/uL  Auto Lymphocyte # : 0.75 K/uL  Auto Monocyte # : 0.73 K/uL  Auto Eosinophil # : 0.00 K/uL  Auto Basophil # : 0.02 K/uL  Auto Neutrophil % : 91.9 %  Auto Lymphocyte % : 3.5 %  Auto Monocyte % : 3.4 %  Auto Eosinophil % : 0.0 %  Auto Basophil % : 0.1 %        150<H>  |  115<H>  |  43<H>  ----------------------------<  227<H>  3.6   |  26  |  0.68    Ca    8.2<L>      28 Dec 2021 06:21  Phos  3.5       Mg     2.50         TPro  6.1  /  Alb  3.4  /  TBili  0.9  /  DBili  x   /  AST  97<H>  /  ALT  53<H>  /  AlkPhos  97      Creatinine Trend: 0.68<--, 0.67<--, 0.67<--, 0.68<--, 0.65<--, 0.65<--  LIVER FUNCTIONS - ( 28 Dec 2021 06:21 )  Alb: 3.4 g/dL / Pro: 6.1 g/dL / ALK PHOS: 97 U/L / ALT: 53 U/L / AST: 97 U/L / GGT: x           PT/INR - ( 28 Dec 2021 01:22 )   PT: 14.5 sec;   INR: 1.28 ratio         PTT - ( 27 Dec 2021 19:01 )  PTT:37.0 sec    hs Troponin:    ABG - ( 28 Dec 2021 06:21 )  pH, Arterial: 7.37  pH, Blood: x     /  pCO2: 44    /  pO2: 63    / HCO3: 25    / Base Excess: -0.1  /  SaO2: 88.9                06:21 - ABG - pH: 7.37  |  pCO2: 44    |  pO2: 63    | Lactate:       | BE: -0.1   01:22 - ABG - pH: 7.44  |  pCO2: 40    |  pO2: 99    | Lactate:       | BE: 2.8    21:50 - ABG - pH: 7.39  |  pCO2: 47    |  pO2: 62    | Lactate:       | BE: 2.8    19:01 - ABG - pH: 7.35  |  pCO2: 52    |  pO2: 25    | Lactate:       | BE: 2.2    17:52 - VBG - pH: 7.38  | pCO2: 49    | pO2: 47    | Lactate: 2.7        Urinalysis Basic - ( 27 Dec 2021 21:50 )    Color: Yellow / Appearance: Clear / S.023 / pH: x  Gluc: x / Ketone: Negative  / Bili: Negative / Urobili: <2 mg/dL   Blood: x / Protein: 30 mg/dL / Nitrite: Negative   Leuk Esterase: Negative / RBC: 4 /HPF / WBC 2 /HPF   Sq Epi: x / Non Sq Epi: 4 /HPF / Bacteria: Negative      CSF:      EKG:   MICROBIOLOGY:    Culture - Urine (collected 25 Dec 2021 18:30)  Source: Clean Catch Clean Catch (Midstream)  Final Report (27 Dec 2021 08:07):    <10,000 CFU/mL Normal Urogenital Lydia      IMAGING:      Labs, imaging, EKG personally reviewed    RADIOLOGY & ADDITIONAL TESTS: Reviewed.

## 2021-12-28 NOTE — CONSULT NOTE ADULT - SUBJECTIVE AND OBJECTIVE BOX
60 y/o F, PMH of Polio (bedbound at baseline w/ chronic contractures), HTN, HLD, Type 2 DM (unknown if on insulin), presented to ED c/o severe L hip pain. Patient nonverbal at baseline. Unable to obtain collateral history from brother given current contact information in chart. Unknown chronicity of left hip pain or duration of location. Per ED notes, patient recently slipped while crawling, which is her baseline for mobility. No other bone/joint complaints.    PAST MEDICAL & SURGICAL HISTORY:    MEDICATIONS  (STANDING):  chlorhexidine 2% Cloths 1 Application(s) Topical daily  dextrose 5% + sodium chloride 0.45%. 1000 milliLiter(s) (75 mL/Hr) IV Continuous <Continuous>    MEDICATIONS  (PRN):    Allergies    No Known Allergies    Intolerances                              12.6   8.00  )-----------( 190      ( 25 Dec 2021 14:57 )             39.6     12-25    154<H>  |  114<H>  |  25<H>  ----------------------------<  253<H>  4.2   |  30  |  0.75    Ca    8.7      25 Dec 2021 22:42  Phos  3.6     12-25  Mg     2.40     12-25    TPro  7.3  /  Alb  3.8  /  TBili  0.6  /  DBili  x   /  AST  102<H>  /  ALT  63<H>  /  AlkPhos  111  12-25        T(C): 37.7 (12-25-21 @ 20:59), Max: 38 (12-25-21 @ 18:44)  HR: 86 (12-25-21 @ 20:59) (86 - 105)  BP: 152/92 (12-25-21 @ 20:59) (136/98 - 152/92)  RR: 19 (12-25-21 @ 20:59) (16 - 26)  SpO2: 98% (12-25-21 @ 20:59) (92% - 99%)  Wt(kg): --    PE   RLE:  Skin intact; No ecchymosis/soft tissue swelling  Compartments soft;   Decreased muscle bulk throughout extremity, most prominent in leg  No TTP to hip/knee/leg/ankle/foot   Full painless ROM about hip  Knee flexion contracture to 90 degrees, able to range leg from 90 degrees to buttocks  Unable to SLR; -Log Roll/Heel Strike  Fires quads, motor impaired GS/TA/FHL/EHL  Unable to assess sensation due to patient's nonverbal status but responds to painful stimuli distally  DP/PT pulses 2+    LLE:  Skin intact; No ecchymosis/soft tissue swelling  Compartments soft;   Leg shortened, externally rotated  Decreased muscle bulk throughout extremity, most prominent in leg  No TTP to hip/knee/leg/ankle/foot  Palpable, mobile femoral head on lateral aspect of buttocks  Painful terminal ROM of hip  Knee flexion contracture to 90 degrees, able to range leg from 90 degrees to buttocks  Unable to SLR; -Log Roll/Heel Strike  Fires quads, motor impaired GS/TA/FHL/EHL  Unable to assess sensation due to patient's nonverbal status but responds to painful stimuli distally  DP/PT pulses 2+    BUE:   No bony TTP; Good ROM w/o pain; Exam Unremarkable    Imaging:  XR demonstrating right hip dysplasia and reduced femoral head, knee joint space narrowing and diffuse femur/tibia/fibula ostepenia; left hip dysplasia with acetabularization of the pelvis, superiorly dislocated femoral head, knee joint space narrowing, diffuse femur/tibia/fibula ostepenia    CT pelvis showing chronic appearing deformity of left femoral head with superolateral dislocation, pseudarthrosis of the acetabulum and proximal femur
HPI:  62 y/o F, PMH of Polio (bedbound at baseline w/ chronic contractures), HTN, HLD, Type 2 DM (unknown if on insulin), currently admitted for ARDS 2/2 COVID. Palliative Medicine was consulted for complex medical decision making related to goals of care discussions    =========================================================================  21    - Chart reviewed. Patient seen and examined.  - Spoke with brother, Cinda:    1) Cinda is the next of kin and the patient's caregiver  2) Updated Cinda about pt's condition: ARDS and shock  3) CODE: Discussed DNR with Cinda. Answered all his q's and c's. He agreed with DNR. Continue rest of current interventions.   4) I did talk to him about the possibility we would be broaching comfort measures if the patient did not recover    =========================================================================  ----- SYMPTOM ASSESSMENT:   [ X]Unable to obtain due to poor mentation: information obtained from team/ contact    [X ]All other review of systems negative, including: weakness, cough, colds, blurry vision, headaches, dysuria, pruritus, palpitations, muscle cramps, easy bruising, epistaxis, rashes    =========================================================================  ----- PERTINENT PMH/ SXH/ FHX  Polio    HTN (hypertension)    Hyperlipidemia    Type 2 diabetes mellitus    No significant past surgical history    FAMILY HISTORY:  No pertinent family history in first degree relatives    ----- SOCIAL HISTORY:   Substance hx: Yes[ ]  No [X ]   Tobacco hx:  Yes [ ] No [ ]   Alcohol hx: Yes [ ] No [ ]   Living Situation: [ X]Home  [ ]Long term care  [ ]Rehab [ ]Other    ----- ADVANCE DIRECTIVES:    DECISION MAKER(s):  Name(s): Phone Number(s):  CINDA (BROTHER)    ----- BASELINE (I)ADL(s) (prior to admission):  Palliative Performance Status Version 2:  60      =========================================================================  -----MEDICATIONS AND ALLERGIES:  Allergies    No Known Allergies    Intolerances    MEDICATIONS  (STANDING):  azithromycin  IVPB      azithromycin  IVPB 500 milliGRAM(s) IV Intermittent every 24 hours  cefTRIAXone   IVPB 1000 milliGRAM(s) IV Intermittent every 24 hours  cefTRIAXone   IVPB      chlorhexidine 0.12% Liquid 15 milliLiter(s) Oral Mucosa every 12 hours  chlorhexidine 2% Cloths 1 Application(s) Topical daily  chlorhexidine 4% Liquid 1 Application(s) Topical <User Schedule>  dexAMETHasone  Injectable 6 milliGRAM(s) IV Push daily  dextrose 40% Gel 15 Gram(s) Oral once  dextrose 5%. 1000 milliLiter(s) (50 mL/Hr) IV Continuous <Continuous>  dextrose 5%. 1000 milliLiter(s) (100 mL/Hr) IV Continuous <Continuous>  dextrose 50% Injectable 25 Gram(s) IV Push once  dextrose 50% Injectable 12.5 Gram(s) IV Push once  dextrose 50% Injectable 25 Gram(s) IV Push once  enoxaparin Injectable 40 milliGRAM(s) SubCutaneous every 24 hours  fentaNYL   Infusion. 2 MICROgram(s)/kG/Hr (8.36 mL/Hr) IV Continuous <Continuous>  glucagon  Injectable 1 milliGRAM(s) IntraMuscular once  insulin lispro (ADMELOG) corrective regimen sliding scale   SubCutaneous every 6 hours  lidocaine   4% Patch 1 Patch Transdermal daily  midazolam Infusion 0.02 mG/kG/Hr (0.84 mL/Hr) IV Continuous <Continuous>  mupirocin 2% Ointment 1 Application(s) Topical two times a day  norepinephrine Infusion 0.05 MICROgram(s)/kG/Min (3.92 mL/Hr) IV Continuous <Continuous>  petrolatum Ophthalmic Ointment 1 Application(s) Both EYES two times a day  sodium chloride 0.45%. 1000 milliLiter(s) (100 mL/Hr) IV Continuous <Continuous>    MEDICATIONS  (PRN):  acetaminophen  Suppository .. 650 milliGRAM(s) Rectal every 6 hours PRN Temp greater or equal to 38.5C (101.3F), Mild Pain (1 - 3), Moderate Pain (4 - 6), Severe Pain (7 - 10)  ALBUTerol    90 MICROgram(s) HFA Inhaler 2 Puff(s) Inhalation every 4 hours PRN Shortness of Breath and/or Wheezing  ondansetron Injectable 4 milliGRAM(s) IV Push every 8 hours PRN Nausea and/or Vomiting      =========================================================================  ----- PHYSICAL EXAM:  Vital Signs Last 24 Hrs  T(C): 36.9 (28 Dec 2021 12:00), Max: 36.9 (28 Dec 2021 12:00)  T(F): 98.5 (28 Dec 2021 12:00), Max: 98.5 (28 Dec 2021 12:00)  HR: 54 (28 Dec 2021 12:43) (48 - 124)  BP: 92/70 (28 Dec 2021 12:00) (79/63 - 147/117)  BP(mean): 78 (28 Dec 2021 12:00) (70 - 128)  RR: 16 (28 Dec 2021 12:43) (16 - 36)  SpO2: 99% (28 Dec 2021 12:43) (89% - 100%) I&O's Summary    27 Dec 2021 07:01  -  28 Dec 2021 07:00  --------------------------------------------------------  IN: 1272.7 mL / OUT: 890 mL / NET: 382.7 mL    28 Dec 2021 07:01  -  28 Dec 2021 13:03  --------------------------------------------------------  IN: 79.1 mL / OUT: 250 mL / NET: -170.9 mL    GEN: INTUBATED AND SEDATED  HEAD: Normocephalic and atraumatic, ETT  NECK: No JVD, no thyromegaly  PULM: VENTILATED PATTERN OF BREATHING, COARSE BS  CV: Pulses 2+ symmetric bilaterally, RRR ON TELE  ABD: Not distended, soft, non-tender,   EXT: No edema, No deformities  PSYCH: UNABLE TO ASSESS  NEURO: No seizures, no tremors  SKIN: No rashes or lesions on exposed skin, No jaundice    =========================================================================  ----- LABS:                        10.5   21.31 )-----------( 247      ( 28 Dec 2021 06:21 )             33.4       150<H>  |  115<H>  |  43<H>  ----------------------------<  227<H>  3.6   |  26  |  0.68    Ca    8.2<L>      28 Dec 2021 06:21  Phos  3.5       Mg     2.50         TPro  6.1  /  Alb  3.4  /  TBili  0.9  /  DBili  x   /  AST  97<H>  /  ALT  53<H>  /  AlkPhos  97    PT/INR - ( 28 Dec 2021 01:22 )   PT: 14.5 sec;   INR: 1.28 ratio         PTT - ( 27 Dec 2021 19:01 )  PTT:37.0 sec  Urinalysis Basic - ( 27 Dec 2021 21:50 )    Color: Yellow / Appearance: Clear / S.023 / pH: x  Gluc: x / Ketone: Negative  / Bili: Negative / Urobili: <2 mg/dL   Blood: x / Protein: 30 mg/dL / Nitrite: Negative   Leuk Esterase: Negative / RBC: 4 /HPF / WBC 2 /HPF   Sq Epi: x / Non Sq Epi: 4 /HPF / Bacteria: Negative        -----RADIOLOGY & ADDITIONAL STUDIES:    PROTEIN CALORIE MALNUTRITION PRESENT: [ ] Yes [ ] No  [ ] PPSV2 < or = to 30% [ ] significant weight loss  [ X] poor nutritional intake [ ] catabolic state [ ] anasarca         REFERRALS:   [X ]Chaplaincy  [ ] Hospice  [ ]Child Life  [X ]Social Work  [X ]Case management [ ]Holistic Therapy       ************************************************************************  PALLIATIVE MEDICINE COORDINATION OF CARE DOCUMENTATION  [x] Inpatient Consult  [ ] Other:  ************************************************************************  ------------------------------------------------------------------------  COORDINATION OF CARE:  --- Palliative Care consulted for:  --- Patient assessed:   --- Patient previously seen by Palliative Care service: NO  ADVANCE CARE PLANNING  --- Code status:   --- MOLST reviewed in chart: NONE  --- HCP/ Surrogate:   --- GOC document found in [facility]: NONE  --- HCP/ Living will/ Other advanced directives in [facility]: NONE  ------------------------------------------------------------------------  CARE PROVIDER DOCUMENTATION:  --- Discussed with MICU  --- Rapid response notes reviewed  --- CM/SW: 201 reviewed  --- SP/SW: Defer PO diet to MD given patient refusal  ------------------------------------------------------------------------  --- Chart reviewed: 30 Minutes [including time used to gather, review and transfer data to this note]  --- Start: 12:00  --- End: 12:30  Prolonged services rendered, as part of this patient's care provided by Palliative Medicine, include: i.chart review for provider and ancillary service documentation, ii.pertinent diagnostics including laboratory and imaging studies,iii. medication review including PRN use, iv. admission history including previous palliative care encounters and [facility] notes, v.advance care planning documents including HCP and MOLST forms in [facility]. Part of Palliative Medicine extended evaluation and management also involves coordination of care with our IDT, the primary and consulting yen, and unit CM/SW and Hospice if eligible. Recommendations based on the information gathered and discussed are outline in the AP of our notes.    ************************************************************************

## 2021-12-28 NOTE — PROCEDURE NOTE - ADDITIONAL PROCEDURE DETAILS
20G 6CM long, Arrow extended dwell IV catheter placed under US in Rt brachial vein
18G 10CM long power-glide midline catheter placed under US in Rt upper arm

## 2021-12-28 NOTE — PROGRESS NOTE ADULT - ASSESSMENT
60 y/o F, PMH of Polio (bedbound at baseline w/ chronic contractures), HTN, HLD, Type 2 DM (unknown if on insulin), presented to ED w/ c/o left hip pain and dry cough x 1 week, found to be Covid+ and have L hip dislocation for which ortho was consulted for which it was determined she needed no acute surgical intervention. Course complicated by persistent hypoxia in the setting of multifocal pneumonia 2/2 COVID and/or aspiration.     Neuro:  Baseline nonverbal but responds to commands and appears alert. Currently intubated and sedated.     Respiratory:   #Acute hypoxic respiratory failure-   -P/F 47 pre-intubation. Severe ARDS.   - s/p intubation, mechanical ventilation, follow up post tube ABG, CXR  -COVID +, was being treated with dexamethasone and remdesvir. Will continue in ICU  -Empirically tx for CAP w/ Azithromycin.  -Previous CT negative for PE, on Lovenox BID    Cardiac:   #HTN- Continue meds as tolerated  #Hyperlipidemia- Continue statin      GI:  No active issues. NPO for now, will start tube feeds in AM.     /Renal:    #Urinary Retention- Positive UA, started on ceftriaxone. Follow up urine cx.   #Hypernatremia- Previously on D5NS, will reassess  #T2DM- Continue SSI in ICU    MSK:    #Polio- Chronic contractures. No intervention at this time. Follow-up nutrition recommendations.   #Chronic Hip dislocations- Ortho following. No acute surgical intervention needed. Will ctm.       Prophylaxis:  #Lovenox BID     GOC:   Will attempt to contact family and obtain code status.   62 y/o F, PMH of Polio (bedbound at baseline w/ chronic contractures), HTN, HLD, Type 2 DM (unknown if on insulin), presented to ED w/ c/o left hip pain and dry cough x 1 week, found to be Covid+ and have L hip dislocation for which ortho was consulted for which it was determined she needed no acute surgical intervention. Course complicated by persistent hypoxia in the setting of multifocal pneumonia 2/2 COVID and/or aspiration.     Neuro:  Baseline nonverbal but responds to commands and appears alert. Currently intubated and sedated.     Respiratory:   #Acute hypoxic respiratory failure-   -P/F 47 pre-intubation. Severe ARDS.   - s/p intubation on 12/27  -COVID +, was being treated with dexamethasone and remdesivir. Will continue dex but stop remdesevir  -Empirically tx for CAP w/ Azithromycin and CTX for now  -Previous CT negative for PE, previously on lovenox BID however after discussion with pharmacy normal ppx dose is appropriate    Cardiac:   -Currently on levophed (started 12/28), titrate to MAP 65  #HTN  #Hyperlipidemia- Continue statin      GI:  No active issues. NPO for now, will start tube feeds in AM.     /Renal:    #Urinary Retention- Positive UA, started on ceftriaxone. Follow up urine cx.   #Hypernatremia- Previously on D5NS, will reassess  #T2DM- Continue SSI in ICU    MSK:    #Polio- Chronic contractures. No intervention at this time. Follow-up nutrition recommendations.   #Chronic Hip dislocations- Ortho following. No acute surgical intervention needed. Will ctm.       Prophylaxis:  #Lovenox BID     GOC:   Will attempt to contact family and obtain code status.   60 y/o F, PMH of Polio (bedbound at baseline w/ chronic contractures), HTN, HLD, Type 2 DM (unknown if on insulin), presented to ED w/ c/o left hip pain and dry cough x 1 week, found to be Covid+ and have L hip dislocation for which ortho was consulted for which it was determined she needed no acute surgical intervention. Course complicated by persistent hypoxia in the setting of multifocal pneumonia 2/2 COVID and/or aspiration.     Neuro:  Baseline nonverbal but responds to commands and appears alert. Currently intubated and sedated.   -Prop d/c'd due to bradycardia  -sedated on fent and versed    Respiratory:   #Acute hypoxic respiratory failure-   -P/F 47 pre-intubation. Severe ARDS.   - s/p intubation on 12/27  -COVID +, was being treated with dexamethasone and remdesivir. Will continue dex but stop remdesevir  -Empirically tx for CAP w/ Azithromycin and CTX for now  -Previous CT negative for PE, previously on lovenox BID however after discussion with pharmacy normal ppx dose is appropriate    Cardiac:   -Currently on levophed (started 12/28), titrate to MAP 65  #HTN  #Hyperlipidemia- Continue statin      GI:  Started on tube feeds today  bowel regimen as pt on fent gtt    /Renal:  #Hypernatremia- will start free water today, q12h BMP and re-eval in AM  #T2DM- Continue SSI in ICU    MSK:    #Polio- Chronic contractures. No intervention at this time. Follow-up nutrition recommendations.   #Chronic Hip dislocations- Ortho following. No acute surgical intervention needed. Will ctm.     ID:  -f/u urine legionella, MRSA swab  -will continue CTX and azithro for now  -blood and urine cultures pending    Prophylaxis:  #Lovenox ppx    GOC:   Appreciate palliative care, pt now DNR. BRANDEE signed and in chart

## 2021-12-28 NOTE — CONSULT NOTE ADULT - ASSESSMENT
61y Female with chronic-appearing left hip dislocation in the setting of polio. Indeterminate chronicity of the dislocation in the absence of collateral information, however pelvic/acetabular changes are consistent with impaired mobility of prolonged duration  - WBAT BLE  - Pain control  - Remaining care per primary  - Discussed with Dr. Mustafa
60 y/o F, PMH of Polio (bedbound at baseline w/ chronic contractures), HTN, HLD, Type 2 DM (unknown if on insulin), currently admitted for ARDS 2/2 COVID. Palliative Medicine was consulted for complex medical decision making related to goals of care discussions    =====================================  # ENCEPHALOPATHY  - Intubated/ sedated  - Next of kin is Walterwan who will be the patient' surrogate decision maker.     # FUNCTIONAL QUADRIPLEGIA  - Pls assist with ADL's  - Skin care as per protocol    # RESPIRATORY FAILURE  - ARDS 2/2 COVID  - Intubated 12/27  - Continue supportive measures as per ICU    # ADVANCE CARE PLANNING  - Code: DNR (12/28)  - HCP: Elizabeth (brother) is legal surrogate  - [facility]  12/28: Updated Gewan about patient's condition: shock and ARDS. Discussed FiO2 is %. Expressed worry that ARDS willget worse despite medical interventions. He agreed with DNR. I also broached that if she does not improve, we may have to start talking about comfort measures    Due to visitation restrictions in the hospital in light of COVID pandemic, all discussions with the patient/ patient's healthcare proxy or surrogate have been done via telehealth. This is to prevent spread of infection within the hospital and out in the community. Total time discussing advance care planning, goals of care discussions, and discussions about code status and hospice = 16 mins

## 2021-12-28 NOTE — PROGRESS NOTE ADULT - ATTENDING COMMENTS
60 y/o F with PMH as above here with acute hypoxemic respiratory failure secondary to COVID-19 pneumonia.  Patient intubated for progressive respiratory failure/ARDS and required intubation and mechanical ventilation.  Cont ARDSnet ventilator management.  Cont steroids for ten day course.  Agree with panculture and empiric antibiotics for now.  Shock, likely distributive in nature, cont vasopressors with goal MAP >65.  Patient examined and case reviewed in detail on bedside rounds. Frequent bedside visits with therapy change today.  Prognosis guarded.

## 2021-12-29 NOTE — PROGRESS NOTE ADULT - SUBJECTIVE AND OBJECTIVE BOX
INTERVAL HPI/OVERNIGHT EVENTS: No acute events overnight    SUBJECTIVE: Patient seen and examined at bedside.     OBJECTIVE:    VITAL SIGNS:  ICU Vital Signs Last 24 Hrs  T(C): 36.7 (29 Dec 2021 00:00), Max: 36.9 (28 Dec 2021 12:00)  T(F): 98 (29 Dec 2021 00:00), Max: 98.5 (28 Dec 2021 12:00)  HR: 61 (29 Dec 2021 07:16) (45 - 83)  BP: 92/70 (28 Dec 2021 12:00) (79/63 - 92/70)  BP(mean): 78 (28 Dec 2021 12:00) (70 - 78)  ABP: 92/73 (29 Dec 2021 07:00) (76/50 - 167/95)  ABP(mean): 84 (29 Dec 2021 07:00) (60 - 123)  RR: 22 (29 Dec 2021 07:00) (16 - 36)  SpO2: 100% (29 Dec 2021 07:16) (92% - 100%)    Mode: AC/ CMV (Assist Control/ Continuous Mandatory Ventilation), RR (machine): 16, TV (machine): 300, FiO2: 80, PEEP: 5, ITime: 0.8, MAP: 11, PIP: 31    - @ 07:01  -  - @ 07:00  --------------------------------------------------------  IN: 1066.2 mL / OUT: 620 mL / NET: 446.2 mL      CAPILLARY BLOOD GLUCOSE      POCT Blood Glucose.: 315 mg/dL (29 Dec 2021 05:41)      PHYSICAL EXAM:    General: NAD  HEENT: NC/AT; PERRL, clear conjunctiva  Neck: supple  Respiratory: CTA b/l  Cardiovascular: +S1/S2; RRR  Abdomen: soft, NT/ND; +BS x4  Extremities: WWP, 2+ peripheral pulses b/l; no LE edema  Skin: normal color and turgor; no rash  Neurological:    MEDICATIONS:  MEDICATIONS  (STANDING):  azithromycin  IVPB      azithromycin  IVPB 500 milliGRAM(s) IV Intermittent every 24 hours  cefTRIAXone   IVPB 1000 milliGRAM(s) IV Intermittent every 24 hours  cefTRIAXone   IVPB      chlorhexidine 0.12% Liquid 15 milliLiter(s) Oral Mucosa every 12 hours  chlorhexidine 2% Cloths 1 Application(s) Topical daily  chlorhexidine 4% Liquid 1 Application(s) Topical <User Schedule>  dexAMETHasone  Injectable 6 milliGRAM(s) IV Push daily  dextrose 40% Gel 15 Gram(s) Oral once  dextrose 5%. 1000 milliLiter(s) (50 mL/Hr) IV Continuous <Continuous>  dextrose 5%. 1000 milliLiter(s) (100 mL/Hr) IV Continuous <Continuous>  dextrose 50% Injectable 25 Gram(s) IV Push once  dextrose 50% Injectable 12.5 Gram(s) IV Push once  dextrose 50% Injectable 25 Gram(s) IV Push once  enoxaparin Injectable 40 milliGRAM(s) SubCutaneous every 24 hours  fentaNYL   Infusion. 2 MICROgram(s)/kG/Hr (8.36 mL/Hr) IV Continuous <Continuous>  glucagon  Injectable 1 milliGRAM(s) IntraMuscular once  insulin lispro (ADMELOG) corrective regimen sliding scale   SubCutaneous every 6 hours  insulin NPH human recombinant 4 Unit(s) SubCutaneous every 8 hours  lidocaine   4% Patch 1 Patch Transdermal daily  midazolam Infusion 0.02 mG/kG/Hr (0.84 mL/Hr) IV Continuous <Continuous>  mupirocin 2% Ointment 1 Application(s) Topical two times a day  norepinephrine Infusion 0.05 MICROgram(s)/kG/Min (3.92 mL/Hr) IV Continuous <Continuous>  petrolatum Ophthalmic Ointment 1 Application(s) Both EYES two times a day  polyethylene glycol 3350 17 Gram(s) Oral daily  senna Syrup 10 milliLiter(s) Oral daily    MEDICATIONS  (PRN):  acetaminophen  Suppository .. 650 milliGRAM(s) Rectal every 6 hours PRN Temp greater or equal to 38.5C (101.3F), Mild Pain (1 - 3), Moderate Pain (4 - 6), Severe Pain (7 - 10)  ALBUTerol    90 MICROgram(s) HFA Inhaler 2 Puff(s) Inhalation every 4 hours PRN Shortness of Breath and/or Wheezing  ondansetron Injectable 4 milliGRAM(s) IV Push every 8 hours PRN Nausea and/or Vomiting      ALLERGIES:  Allergies    No Known Allergies    Intolerances        LABS:                        10.4   23.59 )-----------( 271      ( 29 Dec 2021 01:32 )             33.1     Hemoglobin: 10.4 g/dL ( @ 01:32)  Hemoglobin: 10.5 g/dL ( @ 06:21)  Hemoglobin: 12.5 g/dL ( @ 21:50)  Hemoglobin: 12.4 g/dL ( @ 19:01)  Hemoglobin: 11.6 g/dL ( @ 04:09)    CBC Full  -  ( 29 Dec 2021 01:32 )  WBC Count : 23.59 K/uL  RBC Count : 3.72 M/uL  Hemoglobin : 10.4 g/dL  Hematocrit : 33.1 %  Platelet Count - Automated : 271 K/uL  Mean Cell Volume : 89.0 fL  Mean Cell Hemoglobin : 28.0 pg  Mean Cell Hemoglobin Concentration : 31.4 gm/dL  Auto Neutrophil # : x  Auto Lymphocyte # : x  Auto Monocyte # : x  Auto Eosinophil # : x  Auto Basophil # : x  Auto Neutrophil % : x  Auto Lymphocyte % : x  Auto Monocyte % : x  Auto Eosinophil % : x  Auto Basophil % : x        150<H>  |  116<H>  |  44<H>  ----------------------------<  332<H>  4.5   |  24  |  0.80    Ca    8.2<L>      29 Dec 2021 01:32  Phos  2.6       Mg     2.60         TPro  5.9<L>  /  Alb  3.0<L>  /  TBili  0.7  /  DBili  0.3  /  AST  58<H>  /  ALT  48<H>  /  AlkPhos  102      Creatinine Trend: 0.80<--, 0.68<--, 0.67<--, 0.67<--, 0.68<--, 0.65<--  LIVER FUNCTIONS - ( 29 Dec 2021 01:32 )  Alb: 3.0 g/dL / Pro: 5.9 g/dL / ALK PHOS: 102 U/L / ALT: 48 U/L / AST: 58 U/L / GGT: x           PT/INR - ( 29 Dec 2021 01:32 )   PT: 15.1 sec;   INR: 1.34 ratio         PTT - ( 27 Dec 2021 19:01 )  PTT:37.0 sec    hs Troponin:    ABG - ( 28 Dec 2021 11:15 )  pH, Arterial: 7.36  pH, Blood: x     /  pCO2: 43    /  pO2: 71    / HCO3: 24    / Base Excess: -1.2  /  SaO2: 92.1                11:15 - ABG - pH: 7.36  |  pCO2: 43    |  pO2: 71    | Lactate:       | BE: -1.2       Urinalysis Basic - ( 27 Dec 2021 21:50 )    Color: Yellow / Appearance: Clear / S.023 / pH: x  Gluc: x / Ketone: Negative  / Bili: Negative / Urobili: <2 mg/dL   Blood: x / Protein: 30 mg/dL / Nitrite: Negative   Leuk Esterase: Negative / RBC: 4 /HPF / WBC 2 /HPF   Sq Epi: x / Non Sq Epi: 4 /HPF / Bacteria: Negative      CSF:                      EKG:   MICROBIOLOGY:    Culture - Blood (collected 28 Dec 2021 03:25)  Source: .Blood Blood-Peripheral  Preliminary Report (29 Dec 2021 04:01):    No growth to date.    Culture - Blood (collected 28 Dec 2021 03:24)  Source: .Blood Blood-Venous  Preliminary Report (29 Dec 2021 04:01):    No growth to date.    Culture - Urine (collected 27 Dec 2021 18:31)  Source: Catheterized Catheterized  Final Report (28 Dec 2021 17:43):    >100,000 CFU/ml Streptococcus agalactiae (Group B) isolated    Group B streptococci are susceptible to ampicillin,    penicillin and cefazolin, but may be resistant to    erythromycin and clindamycin.    Recommendations for intrapartum prophylaxis for Group B    streptococci are penicillin or ampicillin.      IMAGING:      Labs, imaging, EKG personally reviewed    RADIOLOGY & ADDITIONAL TESTS: Reviewed.     INTERVAL HPI/OVERNIGHT EVENTS: No acute events overnight, pt started on humulin for hyperglycemia    SUBJECTIVE: Patient seen and examined at bedside. Intubated, sedated, contracted    OBJECTIVE:    VITAL SIGNS:  ICU Vital Signs Last 24 Hrs  T(C): 36.7 (29 Dec 2021 00:00), Max: 36.9 (28 Dec 2021 12:00)  T(F): 98 (29 Dec 2021 00:00), Max: 98.5 (28 Dec 2021 12:00)  HR: 61 (29 Dec 2021 07:16) (45 - 83)  BP: 92/70 (28 Dec 2021 12:00) (79/63 - 92/70)  BP(mean): 78 (28 Dec 2021 12:00) (70 - 78)  ABP: 92/73 (29 Dec 2021 07:00) (76/50 - 167/95)  ABP(mean): 84 (29 Dec 2021 07:00) (60 - 123)  RR: 22 (29 Dec 2021 07:00) (16 - 36)  SpO2: 100% (29 Dec 2021 07:16) (92% - 100%)    Mode: AC/ CMV (Assist Control/ Continuous Mandatory Ventilation), RR (machine): 16, TV (machine): 300, FiO2: 80, PEEP: 5, ITime: 0.8, MAP: 11, PIP: 31     @ 07:01  -   @ 07:00  --------------------------------------------------------  IN: 1066.2 mL / OUT: 620 mL / NET: 446.2 mL      CAPILLARY BLOOD GLUCOSE      POCT Blood Glucose.: 315 mg/dL (29 Dec 2021 05:41)      PHYSICAL EXAM:    General: sedated, intubated, contractures  HEENT: NC/AT; PERRL, clear conjunctiva  Neck: supple  Respiratory: CTA b/l  Cardiovascular: +S1/S2; RRR  Abdomen: soft, NT/ND; +BS x4  Extremities: WWP, 2+ peripheral pulses b/l; no LE edema, notable muscle wasting  Skin: normal color and turgor; no rash  Neurological: unable to assess    MEDICATIONS:  MEDICATIONS  (STANDING):  azithromycin  IVPB      azithromycin  IVPB 500 milliGRAM(s) IV Intermittent every 24 hours  cefTRIAXone   IVPB 1000 milliGRAM(s) IV Intermittent every 24 hours  cefTRIAXone   IVPB      chlorhexidine 0.12% Liquid 15 milliLiter(s) Oral Mucosa every 12 hours  chlorhexidine 2% Cloths 1 Application(s) Topical daily  chlorhexidine 4% Liquid 1 Application(s) Topical <User Schedule>  dexAMETHasone  Injectable 6 milliGRAM(s) IV Push daily  dextrose 40% Gel 15 Gram(s) Oral once  dextrose 5%. 1000 milliLiter(s) (50 mL/Hr) IV Continuous <Continuous>  dextrose 5%. 1000 milliLiter(s) (100 mL/Hr) IV Continuous <Continuous>  dextrose 50% Injectable 25 Gram(s) IV Push once  dextrose 50% Injectable 12.5 Gram(s) IV Push once  dextrose 50% Injectable 25 Gram(s) IV Push once  enoxaparin Injectable 40 milliGRAM(s) SubCutaneous every 24 hours  fentaNYL   Infusion. 2 MICROgram(s)/kG/Hr (8.36 mL/Hr) IV Continuous <Continuous>  glucagon  Injectable 1 milliGRAM(s) IntraMuscular once  insulin lispro (ADMELOG) corrective regimen sliding scale   SubCutaneous every 6 hours  insulin NPH human recombinant 4 Unit(s) SubCutaneous every 8 hours  lidocaine   4% Patch 1 Patch Transdermal daily  midazolam Infusion 0.02 mG/kG/Hr (0.84 mL/Hr) IV Continuous <Continuous>  mupirocin 2% Ointment 1 Application(s) Topical two times a day  norepinephrine Infusion 0.05 MICROgram(s)/kG/Min (3.92 mL/Hr) IV Continuous <Continuous>  petrolatum Ophthalmic Ointment 1 Application(s) Both EYES two times a day  polyethylene glycol 3350 17 Gram(s) Oral daily  senna Syrup 10 milliLiter(s) Oral daily    MEDICATIONS  (PRN):  acetaminophen  Suppository .. 650 milliGRAM(s) Rectal every 6 hours PRN Temp greater or equal to 38.5C (101.3F), Mild Pain (1 - 3), Moderate Pain (4 - 6), Severe Pain (7 - 10)  ALBUTerol    90 MICROgram(s) HFA Inhaler 2 Puff(s) Inhalation every 4 hours PRN Shortness of Breath and/or Wheezing  ondansetron Injectable 4 milliGRAM(s) IV Push every 8 hours PRN Nausea and/or Vomiting      ALLERGIES:  Allergies    No Known Allergies    Intolerances        LABS:                        10.4   23.59 )-----------( 271      ( 29 Dec 2021 01:32 )             33.1     Hemoglobin: 10.4 g/dL ( @ 01:32)  Hemoglobin: 10.5 g/dL ( @ 06:21)  Hemoglobin: 12.5 g/dL ( @ 21:50)  Hemoglobin: 12.4 g/dL ( @ 19:01)  Hemoglobin: 11.6 g/dL ( @ 04:09)    CBC Full  -  ( 29 Dec 2021 01:32 )  WBC Count : 23.59 K/uL  RBC Count : 3.72 M/uL  Hemoglobin : 10.4 g/dL  Hematocrit : 33.1 %  Platelet Count - Automated : 271 K/uL  Mean Cell Volume : 89.0 fL  Mean Cell Hemoglobin : 28.0 pg  Mean Cell Hemoglobin Concentration : 31.4 gm/dL  Auto Neutrophil # : x  Auto Lymphocyte # : x  Auto Monocyte # : x  Auto Eosinophil # : x  Auto Basophil # : x  Auto Neutrophil % : x  Auto Lymphocyte % : x  Auto Monocyte % : x  Auto Eosinophil % : x  Auto Basophil % : x        150<H>  |  116<H>  |  44<H>  ----------------------------<  332<H>  4.5   |  24  |  0.80    Ca    8.2<L>      29 Dec 2021 01:32  Phos  2.6       Mg     2.60         TPro  5.9<L>  /  Alb  3.0<L>  /  TBili  0.7  /  DBili  0.3  /  AST  58<H>  /  ALT  48<H>  /  AlkPhos  102      Creatinine Trend: 0.80<--, 0.68<--, 0.67<--, 0.67<--, 0.68<--, 0.65<--  LIVER FUNCTIONS - ( 29 Dec 2021 01:32 )  Alb: 3.0 g/dL / Pro: 5.9 g/dL / ALK PHOS: 102 U/L / ALT: 48 U/L / AST: 58 U/L / GGT: x           PT/INR - ( 29 Dec 2021 01:32 )   PT: 15.1 sec;   INR: 1.34 ratio         PTT - ( 27 Dec 2021 19:01 )  PTT:37.0 sec    hs Troponin:    ABG - ( 28 Dec 2021 11:15 )  pH, Arterial: 7.36  pH, Blood: x     /  pCO2: 43    /  pO2: 71    / HCO3: 24    / Base Excess: -1.2  /  SaO2: 92.1                11:15 - ABG - pH: 7.36  |  pCO2: 43    |  pO2: 71    | Lactate:       | BE: -1.2       Urinalysis Basic - ( 27 Dec 2021 21:50 )    Color: Yellow / Appearance: Clear / S.023 / pH: x  Gluc: x / Ketone: Negative  / Bili: Negative / Urobili: <2 mg/dL   Blood: x / Protein: 30 mg/dL / Nitrite: Negative   Leuk Esterase: Negative / RBC: 4 /HPF / WBC 2 /HPF   Sq Epi: x / Non Sq Epi: 4 /HPF / Bacteria: Negative    EKG:   MICROBIOLOGY:    Culture - Blood (collected 28 Dec 2021 03:25)  Source: .Blood Blood-Peripheral  Preliminary Report (29 Dec 2021 04:01):    No growth to date.    Culture - Blood (collected 28 Dec 2021 03:24)  Source: .Blood Blood-Venous  Preliminary Report (29 Dec 2021 04:01):    No growth to date.    Culture - Urine (collected 27 Dec 2021 18:31)  Source: Catheterized Catheterized  Final Report (28 Dec 2021 17:43):    >100,000 CFU/ml Streptococcus agalactiae (Group B) isolated    Group B streptococci are susceptible to ampicillin,    penicillin and cefazolin, but may be resistant to    erythromycin and clindamycin.    Recommendations for intrapartum prophylaxis for Group B    streptococci are penicillin or ampicillin.      IMAGING:      Labs, imaging, EKG personally reviewed    RADIOLOGY & ADDITIONAL TESTS: Reviewed.

## 2021-12-29 NOTE — DIETITIAN INITIAL EVALUATION ADULT. - PERTINENT MEDS FT
MEDICATIONS  (STANDING):  azithromycin  IVPB      azithromycin  IVPB 500 milliGRAM(s) IV Intermittent every 24 hours  cefTRIAXone   IVPB 1000 milliGRAM(s) IV Intermittent every 24 hours  cefTRIAXone   IVPB      chlorhexidine 0.12% Liquid 15 milliLiter(s) Oral Mucosa every 12 hours  chlorhexidine 2% Cloths 1 Application(s) Topical daily  chlorhexidine 4% Liquid 1 Application(s) Topical <User Schedule>  dexAMETHasone  Injectable 6 milliGRAM(s) IV Push daily  dextrose 40% Gel 15 Gram(s) Oral once  dextrose 5%. 1000 milliLiter(s) (50 mL/Hr) IV Continuous <Continuous>  dextrose 5%. 1000 milliLiter(s) (100 mL/Hr) IV Continuous <Continuous>  dextrose 50% Injectable 25 Gram(s) IV Push once  dextrose 50% Injectable 12.5 Gram(s) IV Push once  dextrose 50% Injectable 25 Gram(s) IV Push once  enoxaparin Injectable 40 milliGRAM(s) SubCutaneous every 24 hours  fentaNYL   Infusion. 2 MICROgram(s)/kG/Hr (8.36 mL/Hr) IV Continuous <Continuous>  glucagon  Injectable 1 milliGRAM(s) IntraMuscular once  insulin lispro (ADMELOG) corrective regimen sliding scale   SubCutaneous every 6 hours  insulin NPH human recombinant 4 Unit(s) SubCutaneous every 6 hours  lidocaine   4% Patch 1 Patch Transdermal daily  midazolam Infusion 0.02 mG/kG/Hr (0.84 mL/Hr) IV Continuous <Continuous>  multivitamin  Chewable 1 Tablet(s) Oral daily  mupirocin 2% Ointment 1 Application(s) Topical two times a day  norepinephrine Infusion 0.05 MICROgram(s)/kG/Min (3.92 mL/Hr) IV Continuous <Continuous>  petrolatum Ophthalmic Ointment 1 Application(s) Both EYES two times a day  polyethylene glycol 3350 17 Gram(s) Oral daily  senna Syrup 10 milliLiter(s) Oral daily

## 2021-12-29 NOTE — DIETITIAN INITIAL EVALUATION ADULT. - PROBLEM SELECTOR PLAN 3
- Likely chronic per CT scan and Orthopedic recs given h/o polio and chronic contractures  - No acute surgical intervention is needed per d/w Orthopedics team, recs appreciated  - WBAT BLE  - Pain control as needed  - Need collateral information on events surrounding pt's hip pain at home  - PT Eval  - Social Work needed

## 2021-12-29 NOTE — DIETITIAN INITIAL EVALUATION ADULT. - PROBLEM SELECTOR PLAN 1
- Pt currently requiring supplemental oxygen via NRB, titrated down to 6L NC overnight, now satting 95%  - c/w Dexamethasone and Remdesivir   - Inflammatory markers q72hrs   - Monitor SP02 on continuous pulse ox  - CTA Chest completed, negative for PE  - Check US venous duplex b/l LE given her elevated d-dimer  - D-Dimer > 2x ULN, will place on Lovenox 1mg/kg BID as per COVID protocol  - Has mild transaminitis, likely 2/2 COVID, would trend LFTs for now  - Prone positioning, Incentive spirometry and Chest PT PRN.

## 2021-12-29 NOTE — DIETITIAN INITIAL EVALUATION ADULT. - PERTINENT LABORATORY DATA
12-29    150<H>  |  116<H>  |  44<H>  ----------------------------<  332<H>  4.5   |  24  |  0.80    Ca    8.2<L>      29 Dec 2021 01:32  Phos  2.6     12-29  Mg     2.60     12-29    TPro  5.9<L>  /  Alb  3.0<L>  /  TBili  0.7  /  DBili  0.3  /  AST  58<H>  /  ALT  48<H>  /  AlkPhos  102  12-29    HbA1c 7.5%    CAPILLARY BLOOD GLUCOSE      POCT Blood Glucose.: 408 mg/dL (29 Dec 2021 11:20)  POCT Blood Glucose.: 315 mg/dL (29 Dec 2021 05:41)  POCT Blood Glucose.: 288 mg/dL (29 Dec 2021 00:19)  POCT Blood Glucose.: 337 mg/dL (28 Dec 2021 17:22)  POCT Blood Glucose.: 350 mg/dL (28 Dec 2021 17:21)

## 2021-12-29 NOTE — DIETITIAN INITIAL EVALUATION ADULT. - PROBLEM SELECTOR PLAN 4
- Na 155, has trended to 153 while on D5+1/2 NS @ 75 cc/hr  - Likely 2/2 poor PO intake  - Monitor Na, avoid correction of >8 in 24 hours    Addendum:  Patient becoming more hyperglycemic, will change fluids to 1/2NS at 75 cc/hr for 10 hrs, monitor Na

## 2021-12-29 NOTE — PROGRESS NOTE ADULT - ATTENDING COMMENTS
62 y/o F with PMH as above here with acute hypoxemic respiratory failure secondary to COVID-19 pneumonia.  Patient intubated for progressive respiratory failure/ARDS and required intubation and mechanical ventilation.  Cont ARDSnet ventilator management.  Cont steroids for ten day course. Cultures with GBS in the urine, cont abx.  Hypernatremia, start free water.  Shock, likely distributive in nature, cont vasopressors with goal MAP >65.  Patient examined and case reviewed in detail on bedside rounds. Frequent bedside visits with therapy change today.  Prognosis guarded.

## 2021-12-29 NOTE — PROGRESS NOTE ADULT - ASSESSMENT
62 y/o F, PMH of Polio (bedbound at baseline w/ chronic contractures), HTN, HLD, Type 2 DM (unknown if on insulin), presented to ED w/ c/o left hip pain and dry cough x 1 week, found to be Covid+ and have L hip dislocation for which ortho was consulted for which it was determined she needed no acute surgical intervention. Course complicated by persistent hypoxia in the setting of multifocal pneumonia 2/2 COVID and/or aspiration.     Neuro:  Baseline nonverbal but responds to commands and appears alert. Currently intubated and sedated.   -Prop d/c'd due to bradycardia  -sedated on fent and versed    Respiratory:   #Acute hypoxic respiratory failure-   -P/F 47 pre-intubation. Severe ARDS.   - s/p intubation on 12/27  -COVID +, was being treated with dexamethasone and remdesivir. Will continue dex but stop remdesevir  -Empirically tx for CAP w/ Azithromycin and CTX for now  -Previous CT negative for PE, previously on lovenox BID however after discussion with pharmacy normal ppx dose is appropriate    Cardiac:   -Currently on levophed (started 12/28), titrate to MAP 65  #HTN  #Hyperlipidemia- Continue statin      GI:  Started on tube feeds today  bowel regimen as pt on fent gtt    /Renal:  #Hypernatremia- will start free water today, q12h BMP and re-eval in AM  #T2DM- Continue SSI in ICU    MSK:    #Polio- Chronic contractures. No intervention at this time. Follow-up nutrition recommendations.   #Chronic Hip dislocations- Ortho following. No acute surgical intervention needed. Will ctm.     ID:  -f/u urine legionella, MRSA swab  -will continue CTX and azithro for now  -blood and urine cultures pending    Prophylaxis:  #Lovenox ppx    GOC:   Appreciate palliative care, pt now DNR. BRANDEE signed and in chart   62 y/o F, PMH of Polio (bedbound at baseline w/ chronic contractures), HTN, HLD, Type 2 DM (unknown if on insulin), presented to ED w/ c/o left hip pain and dry cough x 1 week, found to be Covid+ and have L hip dislocation for which ortho was consulted for which it was determined she needed no acute surgical intervention. Course complicated by persistent hypoxia in the setting of multifocal pneumonia 2/2 COVID and/or aspiration.     Neuro:  Baseline nonverbal but responds to commands. Currently intubated and sedated.   -Prop d/c'd due to bradycardia  -sedated on fent and versed    Respiratory:   #Acute hypoxic respiratory failure-   -P/F 47 pre-intubation. Severe ARDS.   - s/p intubation on 12/27  -COVID +, was being treated with dexamethasone and remdesivir. Will continue dex but stop remdesevir  -Empirically tx for CAP w/ Azithromycin and CTX for now  -Previous CT negative for PE, previously on lovenox BID however after discussion with pharmacy normal ppx dose is appropriate    Cardiac:   -Currently on levophed (started 12/28), titrate to MAP 65  #HTN  #Hyperlipidemia- Continue statin      GI:  Tube feeds  bowel regimen as pt on fent gtt    /Renal:  #Hypernatremia- continue free water flushes and check PM BMP   #T2DM- Humulin 4U q6h and mod ISS given worsening hyperglycemia to 400s    MSK:    #Polio- Chronic contractures. No intervention at this time. Follow-up nutrition recommendations.   #Chronic Hip dislocations- Ortho following. No acute surgical intervention needed. Will ctm.     ID:  -f/u urine legionella  -will continue CTX and azithro for now  -blood cx NGTD  -urine cultures growing GBS    Prophylaxis:  #Lovenox ppx    GOC:   Appreciate palliative care, pt now DNR. BRANDEE signed and in chart

## 2021-12-30 NOTE — PROGRESS NOTE ADULT - SUBJECTIVE AND OBJECTIVE BOX
HPI:  60 y/o F, PMH of Polio (bedbound at baseline w/ chronic contractures), HTN, HLD, Type 2 DM (unknown if on insulin), currently admitted for ARDS 2/2 COVID. Palliative Medicine was consulted for complex medical decision making related to goals of care discussions    =========================================================================  12/30/21    - Chart reviewed. Patient seen and examined.  - Spoke with brother, Cinda:    1) Cinda updated about patient's condition. 100% FiO2 and shock. I told him explicitly the patient is dying. Cinda understood  2) Told him that we had talked about a time all medical interventions might be exhausted with the patient not improving regardless. I told him unfortunately that that time has come.  3) Cinda said to "keep her on the machines and let's see". He did not wish for palliative extubation or comfort measures. He is still aware that patient will likely pass if condition does not improve.     =========================================================================  ----- SYMPTOM ASSESSMENT:   [ X]Unable to obtain due to poor mentation: information obtained from team/ contact    [X ]All other review of systems negative, including: weakness, cough, colds, blurry vision, headaches, dysuria, pruritus, palpitations, muscle cramps, easy bruising, epistaxis, rashes    =========================================================================  ----- PERTINENT PMH/ SXH/ FHX  Polio    HTN (hypertension)    Hyperlipidemia    Type 2 diabetes mellitus    No significant past surgical history    FAMILY HISTORY:  No pertinent family history in first degree relatives    ----- SOCIAL HISTORY:   Substance hx: Yes[ ]  No [X ]   Tobacco hx:  Yes [ ] No [ ]   Alcohol hx: Yes [ ] No [ ]   Living Situation: [ X]Home  [ ]Long term care  [ ]Rehab [ ]Other    ----- ADVANCE DIRECTIVES:    DECISION MAKER(s):  Name(s): Phone Number(s):  CINDA (BROTHER)    ----- BASELINE (I)ADL(s) (prior to admission):  Palliative Performance Status Version 2:  60      =========================================================================  -----MEDICATIONS AND ALLERGIES:  Allergies    No Known Allergies    Intolerances    MEDICATIONS  (STANDING):  azithromycin  IVPB      azithromycin  IVPB 500 milliGRAM(s) IV Intermittent every 24 hours  cefTRIAXone   IVPB 1000 milliGRAM(s) IV Intermittent every 24 hours  cefTRIAXone   IVPB      chlorhexidine 0.12% Liquid 15 milliLiter(s) Oral Mucosa every 12 hours  chlorhexidine 2% Cloths 1 Application(s) Topical daily  chlorhexidine 4% Liquid 1 Application(s) Topical <User Schedule>  dexAMETHasone  Injectable 6 milliGRAM(s) IV Push daily  dextrose 40% Gel 15 Gram(s) Oral once  dextrose 5%. 1000 milliLiter(s) (50 mL/Hr) IV Continuous <Continuous>  dextrose 5%. 1000 milliLiter(s) (100 mL/Hr) IV Continuous <Continuous>  dextrose 50% Injectable 25 Gram(s) IV Push once  dextrose 50% Injectable 12.5 Gram(s) IV Push once  dextrose 50% Injectable 25 Gram(s) IV Push once  enoxaparin Injectable 40 milliGRAM(s) SubCutaneous every 24 hours  fentaNYL   Infusion. 2 MICROgram(s)/kG/Hr (8.36 mL/Hr) IV Continuous <Continuous>  glucagon  Injectable 1 milliGRAM(s) IntraMuscular once  insulin lispro (ADMELOG) corrective regimen sliding scale   SubCutaneous every 6 hours  lidocaine   4% Patch 1 Patch Transdermal daily  midazolam Infusion 0.02 mG/kG/Hr (0.84 mL/Hr) IV Continuous <Continuous>  mupirocin 2% Ointment 1 Application(s) Topical two times a day  norepinephrine Infusion 0.05 MICROgram(s)/kG/Min (3.92 mL/Hr) IV Continuous <Continuous>  petrolatum Ophthalmic Ointment 1 Application(s) Both EYES two times a day  sodium chloride 0.45%. 1000 milliLiter(s) (100 mL/Hr) IV Continuous <Continuous>    MEDICATIONS  (PRN):  acetaminophen  Suppository .. 650 milliGRAM(s) Rectal every 6 hours PRN Temp greater or equal to 38.5C (101.3F), Mild Pain (1 - 3), Moderate Pain (4 - 6), Severe Pain (7 - 10)  ALBUTerol    90 MICROgram(s) HFA Inhaler 2 Puff(s) Inhalation every 4 hours PRN Shortness of Breath and/or Wheezing  ondansetron Injectable 4 milliGRAM(s) IV Push every 8 hours PRN Nausea and/or Vomiting      =========================================================================  ----- PHYSICAL EXAM:  Vital Signs Last 24 Hrs  T(C): 38.1 (30 Dec 2021 12:00), Max: 38.3 (30 Dec 2021 00:00)  T(F): 100.5 (30 Dec 2021 12:00), Max: 101 (30 Dec 2021 08:00)  HR: 73 (30 Dec 2021 13:00) (60 - 117)  BP: 139/90 (29 Dec 2021 17:00) (119/82 - 139/90)  BP(mean): 104 (29 Dec 2021 17:00) (90 - 104)  RR: 17 (30 Dec 2021 13:00) (17 - 33)  SpO2: 95% (30 Dec 2021 13:00) (84% - 100%)    GEN: INTUBATED AND SEDATED  HEAD: Normocephalic and atraumatic, ETT  NECK: No JVD, no thyromegaly  PULM: VENTILATED PATTERN OF BREATHING, COARSE BS  CV: Pulses 2+ symmetric bilaterally, RRR ON TELE  ABD: Not distended, soft, non-tender,   EXT: No edema, No deformities  PSYCH: UNABLE TO ASSESS  NEURO: No seizures, no tremors  SKIN: No rashes or lesions on exposed skin, No jaundice    =========================================================================  ----- LABS:                      12-30    154<H>  |  120<H>  |  31<H>  ----------------------------<  374<H>  4.6   |  25  |  x     Ca    7.9<L>      30 Dec 2021 02:54  Phos  2.3     12-30  Mg     2.70     12-30    TPro  5.5<L>  /  Alb  2.5<L>  /  TBili  0.5  /  DBili  <0.2  /  AST  53<H>  /  ALT  41<H>  /  AlkPhos  103  12-30

## 2021-12-30 NOTE — PROGRESS NOTE ADULT - ATTENDING COMMENTS
60 y/o F with PMH as above here with acute hypoxemic respiratory failure secondary to COVID-19 pneumonia.  Patient intubated for progressive respiratory failure/ARDS and required intubation and mechanical ventilation.  Cont ARDSnet ventilator management.  Cont steroids for ten day course. Cultures with GBS in the urine, cont abx.  Hypernatremia, cont free water.  Shock, likely distributive in nature, cont vasopressors with goal MAP >65. Hyperglycemia, will increase NPH coverage to 14 U. Patient examined and case reviewed in detail on bedside rounds. Frequent bedside visits with therapy change today.  Prognosis very poor. 62 y/o F with PMH as above here with acute hypoxemic respiratory failure secondary to COVID-19 pneumonia.  Patient intubated for progressive respiratory failure/ARDS and required intubation and mechanical ventilation.  Cont ARDSnet ventilator management.  Cont steroids for ten day course. Cultures with GBS in the urine, but febrile overnight, so broadened to Zosyn.  Hypernatremia, cont free water.  Shock, likely distributive in nature, cont vasopressors with goal MAP >65. Hyperglycemia, will increase NPH coverage to 14 U. Patient examined and case reviewed in detail on bedside rounds. Frequent bedside visits with therapy change today.  Prognosis very poor.

## 2021-12-30 NOTE — PROGRESS NOTE ADULT - ASSESSMENT
60 y/o F, PMH of Polio (bedbound at baseline w/ chronic contractures), HTN, HLD, Type 2 DM (unknown if on insulin), presented to ED w/ c/o left hip pain and dry cough x 1 week, found to be Covid+ and have L hip dislocation for which ortho was consulted for which it was determined she needed no acute surgical intervention. Course complicated by persistent hypoxia in the setting of multifocal pneumonia 2/2 COVID and/or aspiration.     Neuro:  Baseline nonverbal but responds to commands. Currently intubated and sedated.   -Prop d/c'd due to bradycardia  -sedated on fent and versed    Respiratory:   #Acute hypoxic respiratory failure-   -P/F 47 pre-intubation. Severe ARDS.   - s/p intubation on 12/27  -COVID +, was being treated with dexamethasone and remdesivir. Will continue dex but stop remdesevir  -Empirically tx for CAP w/ Azithromycin and CTX for now  -Previous CT negative for PE, previously on lovenox BID however after discussion with pharmacy normal ppx dose is appropriate    Cardiac:   -Currently on levophed (started 12/28), titrate to MAP 65  #HTN  #Hyperlipidemia- Continue statin      GI:  Tube feeds  bowel regimen as pt on fent gtt    /Renal:  #Hypernatremia- worsening despite free water flush, q12BMP, currently on 350cc q6h FW  #T2DM- Humulin 9U q6h and mod ISS given worsening hyperglycemia. Adjust humulin aggressively, if not controlled may been insulin gtt    MSK:    #Polio- Chronic contractures. No intervention at this time. Follow-up nutrition recommendations.   #Chronic Hip dislocations- Ortho following. No acute surgical intervention needed. Will ctm.     ID:  - urine legionella neg, Lg dc'd on 12/29  -will continue CTX  -blood cx NGTD  -urine cultures growing GBS    Prophylaxis:  #Lovenox ppx    GOC:   Appreciate palliative care, pt now DNRDeborah IRVIN signed and in chart   60 y/o F, PMH of Polio (bedbound at baseline w/ chronic contractures), HTN, HLD, Type 2 DM (unknown if on insulin), presented to ED w/ c/o left hip pain and dry cough x 1 week, found to be Covid+ and have L hip dislocation for which ortho was consulted for which it was determined she needed no acute surgical intervention. Course complicated by persistent hypoxia in the setting of multifocal pneumonia 2/2 COVID and/or aspiration.     Neuro:  Baseline nonverbal but responds to commands. Currently intubated and sedated.   -c/w fent and versed  -added low dose prop as pt overbreathing vent and no longer bradycardic    Respiratory:   #Acute hypoxic respiratory failure-   -P/F 47 pre-intubation. Severe ARDS.   - s/p intubation on 12/27  -COVID +, was being treated with dexamethasone and remdesivir. decadron to stop 1/5, remdesivir discontinued upon arrival to MICU  -Empirically tx for CAP abx. Previously on azithro and CTX but urine legionella negative and pt febrile so coverage broadened to zosyn on 12/30   -Previous CT negative for PE, previously on lovenox BID however after discussion with pharmacy normal ppx dose is appropriate    Cardiac:   -Currently on levophed (started 12/28), titrate to MAP 65  #HTN  #Hyperlipidemia- Continue statin      GI:  Tube feeds  bowel regimen as pt on fent gtt    /Renal:  #Hypernatremia- gradually improving on free water, now on 350 q4h free water, will check q12BMP  #T2DM- Humulin 14U q6h and mod ISS given worsening hyperglycemia. Adjust humulin aggressively, if not controlled may consider insulin gtt    MSK:    #Polio- Chronic contractures. No intervention at this time. Follow-up nutrition recommendations.   #Chronic Hip dislocations- Ortho following. No acute surgical intervention needed. Will ctm.     ID:  - urine legionella neg, Azithro dc'd on 12/29  -CTX broadened to zosyn on 12/30 as pt febrile  -blood cx NGTD, repeat cultures ordered  -urine cultures growing GBS    Prophylaxis:  #Lovenox ppx    GOC:   Appreciate palliative care, pt DNR. BRANDEE signed and in chart

## 2021-12-30 NOTE — PROGRESS NOTE ADULT - ASSESSMENT
60 y/o F, PMH of Polio (bedbound at baseline w/ chronic contractures), HTN, HLD, Type 2 DM (unknown if on insulin), currently admitted for ARDS 2/2 COVID. Palliative Medicine was consulted for complex medical decision making related to goals of care discussions    =====================================  # ENCEPHALOPATHY  - Intubated/ sedated  - At her state now, she is does not have capacity to make decisions. Next of kin is Walterwakyle who will be the patient' surrogate decision maker.  - Keep on sedation. No visible signs of distress with current interventions.     # FUNCTIONAL QUADRIPLEGIA  - Pls assist with ADL's  - Skin care as per protocol while in bed    # RESPIRATORY FAILURE  - ARDS 2/2 COVID  - Intubated 12/27  - 12/30: Now on 100% FiO2. I told Elizabeth there is nothing above 100%. I agree with MICU that no escalation of care should be done if the patient will have no meaningful recovery    # ADVANCE CARE PLANNING  - Code: DNR (12/28)  - HCP: Elizabeth (brother) is legal surrogate  - [facility]  12/28: Updated Gewan about patient's condition: shock and ARDS. Discussed FiO2 is %. Expressed worry that ARDS willget worse despite medical interventions. He agreed with DNR. I also broached that if she does not improve, we may have to start talking about comfort measures  12/30: Elizabeth understood patient will likely die if she does not improve. Walterwan aware patient on supramaximal therapy. I agree with MICU team that no escalation of care should be done if patient deemed not to have any meaningful recovery. Patient DNR.     Due to visitation restrictions in the hospital in light of COVID pandemic, all discussions with the patient/ patient's healthcare proxy or surrogate have been done via telehealth. This is to prevent spread of infection within the hospital and out in the community. Total time discussing advance care planning, goals of care discussions, and discussions about code status and hospice = 16 mins

## 2021-12-30 NOTE — PROGRESS NOTE ADULT - SUBJECTIVE AND OBJECTIVE BOX
INTERVAL HPI/OVERNIGHT EVENTS: No acute events overnight, pt hyperglycemic yesterday and started on humulin    SUBJECTIVE: Patient seen and examined at bedside.     OBJECTIVE:    VITAL SIGNS:  ICU Vital Signs Last 24 Hrs  T(C): 37.8 (30 Dec 2021 06:00), Max: 38.3 (30 Dec 2021 00:00)  T(F): 100 (30 Dec 2021 06:00), Max: 100.9 (30 Dec 2021 00:00)  HR: 85 (30 Dec 2021 07:00) (55 - 117)  BP: 139/90 (29 Dec 2021 17:00) (88/52 - 152/84)  BP(mean): 104 (29 Dec 2021 17:00) (64 - 104)  ABP: 93/54 (30 Dec 2021 07:00) (62/45 - 125/79)  ABP(mean): 69 (30 Dec 2021 07:00) (51 - 99)  RR: 29 (30 Dec 2021 07:00) (21 - 33)  SpO2: 91% (30 Dec 2021 07:00) (88% - 100%)    Mode: AC/ CMV (Assist Control/ Continuous Mandatory Ventilation), RR (machine): 16, TV (machine): 300, FiO2: 80, PEEP: 5, ITime: 0.79, MAP: 10, PIP: 26    12-29 @ 07:01  -  12-30 @ 07:00  --------------------------------------------------------  IN: 2278 mL / OUT: 1515 mL / NET: 763 mL      CAPILLARY BLOOD GLUCOSE      POCT Blood Glucose.: 326 mg/dL (30 Dec 2021 04:40)      PHYSICAL EXAM:    General: NAD  HEENT: NC/AT; PERRL, clear conjunctiva  Neck: supple  Respiratory: CTA b/l  Cardiovascular: +S1/S2; RRR  Abdomen: soft, NT/ND; +BS x4  Extremities: WWP, 2+ peripheral pulses b/l; no LE edema  Skin: normal color and turgor; no rash  Neurological:    MEDICATIONS:  MEDICATIONS  (STANDING):  cefTRIAXone   IVPB 1000 milliGRAM(s) IV Intermittent every 24 hours  cefTRIAXone   IVPB      chlorhexidine 0.12% Liquid 15 milliLiter(s) Oral Mucosa every 12 hours  chlorhexidine 2% Cloths 1 Application(s) Topical daily  chlorhexidine 4% Liquid 1 Application(s) Topical <User Schedule>  dexAMETHasone  Injectable 6 milliGRAM(s) IV Push daily  dextrose 40% Gel 15 Gram(s) Oral once  dextrose 5%. 1000 milliLiter(s) (50 mL/Hr) IV Continuous <Continuous>  dextrose 5%. 1000 milliLiter(s) (100 mL/Hr) IV Continuous <Continuous>  dextrose 50% Injectable 25 Gram(s) IV Push once  dextrose 50% Injectable 12.5 Gram(s) IV Push once  dextrose 50% Injectable 25 Gram(s) IV Push once  enoxaparin Injectable 40 milliGRAM(s) SubCutaneous every 24 hours  fentaNYL   Infusion. 2 MICROgram(s)/kG/Hr (8.36 mL/Hr) IV Continuous <Continuous>  glucagon  Injectable 1 milliGRAM(s) IntraMuscular once  insulin lispro (ADMELOG) corrective regimen sliding scale   SubCutaneous every 6 hours  insulin NPH human recombinant 9 Unit(s) SubCutaneous every 6 hours  lidocaine   4% Patch 1 Patch Transdermal daily  midazolam Infusion 0.02 mG/kG/Hr (0.84 mL/Hr) IV Continuous <Continuous>  multivitamin 1 Tablet(s) Oral daily  mupirocin 2% Ointment 1 Application(s) Topical two times a day  norepinephrine Infusion 0.05 MICROgram(s)/kG/Min (3.92 mL/Hr) IV Continuous <Continuous>  petrolatum Ophthalmic Ointment 1 Application(s) Both EYES two times a day  polyethylene glycol 3350 17 Gram(s) Oral daily  senna Syrup 10 milliLiter(s) Oral daily    MEDICATIONS  (PRN):  acetaminophen     Tablet .. 650 milliGRAM(s) Oral every 6 hours PRN Temp greater or equal to 38C (100.4F)  ALBUTerol    90 MICROgram(s) HFA Inhaler 2 Puff(s) Inhalation every 4 hours PRN Shortness of Breath and/or Wheezing      ALLERGIES:  Allergies    No Known Allergies    Intolerances        LABS:                        9.9    25.47 )-----------( 173      ( 30 Dec 2021 02:16 )             30.6     Hemoglobin: 9.9 g/dL (12-30 @ 02:16)  Hemoglobin: 10.4 g/dL (12-29 @ 01:32)  Hemoglobin: 10.5 g/dL (12-28 @ 06:21)  Hemoglobin: 12.5 g/dL (12-27 @ 21:50)  Hemoglobin: 12.4 g/dL (12-27 @ 19:01)    CBC Full  -  ( 30 Dec 2021 02:16 )  WBC Count : 25.47 K/uL  RBC Count : 3.42 M/uL  Hemoglobin : 9.9 g/dL  Hematocrit : 30.6 %  Platelet Count - Automated : 173 K/uL  Mean Cell Volume : 89.5 fL  Mean Cell Hemoglobin : 28.9 pg  Mean Cell Hemoglobin Concentration : 32.4 gm/dL  Auto Neutrophil # : x  Auto Lymphocyte # : x  Auto Monocyte # : x  Auto Eosinophil # : x  Auto Basophil # : x  Auto Neutrophil % : x  Auto Lymphocyte % : x  Auto Monocyte % : x  Auto Eosinophil % : x  Auto Basophil % : x    12-30    x   |  x   |  x   ----------------------------<  x   x    |  x   |  0.69    Ca    8.3<L>      29 Dec 2021 16:28  Phos  1.4     12-29  Mg     2.80     12-29    TPro  5.5<L>  /  Alb  2.5<L>  /  TBili  0.5  /  DBili  <0.2  /  AST  53<H>  /  ALT  41<H>  /  AlkPhos  103  12-30    Creatinine Trend: 0.69<--, 0.75<--, 0.80<--, 0.68<--, 0.67<--, 0.67<--  LIVER FUNCTIONS - ( 30 Dec 2021 02:16 )  Alb: 2.5 g/dL / Pro: 5.5 g/dL / ALK PHOS: 103 U/L / ALT: 41 U/L / AST: 53 U/L / GGT: x           PT/INR - ( 30 Dec 2021 02:16 )   PT: 14.2 sec;   INR: 1.25 ratio             hs Troponin:    ABG - ( 30 Dec 2021 02:16 )  pH, Arterial: 7.45  pH, Blood: x     /  pCO2: 40    /  pO2: 69    / HCO3: 28    / Base Excess: 3.5   /  SaO2: 93.7                02:16 - ABG - pH: 7.45  |  pCO2: 40    |  pO2: 69    | Lactate:       | BE: 3.5    21:41 - ABG - pH: 7.44  |  pCO2: 36    |  pO2: 76    | Lactate:       | BE: 0.5    16:28 - ABG - pH: 7.37  |  pCO2: 42    |  pO2: 110   | Lactate:       | BE: -1.0     CSF:    EKG:   MICROBIOLOGY:    Culture - Blood (collected 28 Dec 2021 03:25)  Source: .Blood Blood-Peripheral  Preliminary Report (29 Dec 2021 04:01):    No growth to date.    Culture - Blood (collected 28 Dec 2021 03:24)  Source: .Blood Blood-Venous  Preliminary Report (29 Dec 2021 04:01):    No growth to date.    Culture - Urine (collected 27 Dec 2021 18:31)  Source: Catheterized Catheterized  Final Report (28 Dec 2021 17:43):    >100,000 CFU/ml Streptococcus agalactiae (Group B) isolated    Group B streptococci are susceptible to ampicillin,    penicillin and cefazolin, but may be resistant to    erythromycin and clindamycin.    Recommendations for intrapartum prophylaxis for Group B    streptococci are penicillin or ampicillin.      IMAGING:      Labs, imaging, EKG personally reviewed    RADIOLOGY & ADDITIONAL TESTS: Reviewed.     INTERVAL HPI/OVERNIGHT EVENTS: No acute events overnight, pt hyperglycemic yesterday and started on humulin    SUBJECTIVE: Patient seen and examined at bedside. Pt febrile, otherwise sedated, intubated, overbreathing vent.     OBJECTIVE:    VITAL SIGNS:  ICU Vital Signs Last 24 Hrs  T(C): 37.8 (30 Dec 2021 06:00), Max: 38.3 (30 Dec 2021 00:00)  T(F): 100 (30 Dec 2021 06:00), Max: 100.9 (30 Dec 2021 00:00)  HR: 85 (30 Dec 2021 07:00) (55 - 117)  BP: 139/90 (29 Dec 2021 17:00) (88/52 - 152/84)  BP(mean): 104 (29 Dec 2021 17:00) (64 - 104)  ABP: 93/54 (30 Dec 2021 07:00) (62/45 - 125/79)  ABP(mean): 69 (30 Dec 2021 07:00) (51 - 99)  RR: 29 (30 Dec 2021 07:00) (21 - 33)  SpO2: 91% (30 Dec 2021 07:00) (88% - 100%)    Mode: AC/ CMV (Assist Control/ Continuous Mandatory Ventilation), RR (machine): 16, TV (machine): 300, FiO2: 80, PEEP: 5, ITime: 0.79, MAP: 10, PIP: 26    12-29 @ 07:01  -  12-30 @ 07:00  --------------------------------------------------------  IN: 2278 mL / OUT: 1515 mL / NET: 763 mL      CAPILLARY BLOOD GLUCOSE      POCT Blood Glucose.: 326 mg/dL (30 Dec 2021 04:40)      PHYSICAL EXAM:    General: sedated, intubated, contractures  HEENT: NC/AT; PERRL, clear conjunctiva  Neck: supple  Respiratory: CTA b/l, overbreathing vent, accessory muscle usage  Cardiovascular: +S1/S2; RRR  Abdomen: soft, NT/ND; +BS x4  Extremities: WWP, 2+ peripheral pulses b/l; no LE edema, notable muscle wasting  Skin: normal color and turgor; no rash  Neurological: unable to assess    MEDICATIONS:  MEDICATIONS  (STANDING):  cefTRIAXone   IVPB 1000 milliGRAM(s) IV Intermittent every 24 hours  cefTRIAXone   IVPB      chlorhexidine 0.12% Liquid 15 milliLiter(s) Oral Mucosa every 12 hours  chlorhexidine 2% Cloths 1 Application(s) Topical daily  chlorhexidine 4% Liquid 1 Application(s) Topical <User Schedule>  dexAMETHasone  Injectable 6 milliGRAM(s) IV Push daily  dextrose 40% Gel 15 Gram(s) Oral once  dextrose 5%. 1000 milliLiter(s) (50 mL/Hr) IV Continuous <Continuous>  dextrose 5%. 1000 milliLiter(s) (100 mL/Hr) IV Continuous <Continuous>  dextrose 50% Injectable 25 Gram(s) IV Push once  dextrose 50% Injectable 12.5 Gram(s) IV Push once  dextrose 50% Injectable 25 Gram(s) IV Push once  enoxaparin Injectable 40 milliGRAM(s) SubCutaneous every 24 hours  fentaNYL   Infusion. 2 MICROgram(s)/kG/Hr (8.36 mL/Hr) IV Continuous <Continuous>  glucagon  Injectable 1 milliGRAM(s) IntraMuscular once  insulin lispro (ADMELOG) corrective regimen sliding scale   SubCutaneous every 6 hours  insulin NPH human recombinant 9 Unit(s) SubCutaneous every 6 hours  lidocaine   4% Patch 1 Patch Transdermal daily  midazolam Infusion 0.02 mG/kG/Hr (0.84 mL/Hr) IV Continuous <Continuous>  multivitamin 1 Tablet(s) Oral daily  mupirocin 2% Ointment 1 Application(s) Topical two times a day  norepinephrine Infusion 0.05 MICROgram(s)/kG/Min (3.92 mL/Hr) IV Continuous <Continuous>  petrolatum Ophthalmic Ointment 1 Application(s) Both EYES two times a day  polyethylene glycol 3350 17 Gram(s) Oral daily  senna Syrup 10 milliLiter(s) Oral daily    MEDICATIONS  (PRN):  acetaminophen     Tablet .. 650 milliGRAM(s) Oral every 6 hours PRN Temp greater or equal to 38C (100.4F)  ALBUTerol    90 MICROgram(s) HFA Inhaler 2 Puff(s) Inhalation every 4 hours PRN Shortness of Breath and/or Wheezing      ALLERGIES:  Allergies    No Known Allergies    Intolerances        LABS:                        9.9    25.47 )-----------( 173      ( 30 Dec 2021 02:16 )             30.6     Hemoglobin: 9.9 g/dL (12-30 @ 02:16)  Hemoglobin: 10.4 g/dL (12-29 @ 01:32)  Hemoglobin: 10.5 g/dL (12-28 @ 06:21)  Hemoglobin: 12.5 g/dL (12-27 @ 21:50)  Hemoglobin: 12.4 g/dL (12-27 @ 19:01)    CBC Full  -  ( 30 Dec 2021 02:16 )  WBC Count : 25.47 K/uL  RBC Count : 3.42 M/uL  Hemoglobin : 9.9 g/dL  Hematocrit : 30.6 %  Platelet Count - Automated : 173 K/uL  Mean Cell Volume : 89.5 fL  Mean Cell Hemoglobin : 28.9 pg  Mean Cell Hemoglobin Concentration : 32.4 gm/dL  Auto Neutrophil # : x  Auto Lymphocyte # : x  Auto Monocyte # : x  Auto Eosinophil # : x  Auto Basophil # : x  Auto Neutrophil % : x  Auto Lymphocyte % : x  Auto Monocyte % : x  Auto Eosinophil % : x  Auto Basophil % : x    12-30    x   |  x   |  x   ----------------------------<  x   x    |  x   |  0.69    Ca    8.3<L>      29 Dec 2021 16:28  Phos  1.4     12-29  Mg     2.80     12-29    TPro  5.5<L>  /  Alb  2.5<L>  /  TBili  0.5  /  DBili  <0.2  /  AST  53<H>  /  ALT  41<H>  /  AlkPhos  103  12-30    Creatinine Trend: 0.69<--, 0.75<--, 0.80<--, 0.68<--, 0.67<--, 0.67<--  LIVER FUNCTIONS - ( 30 Dec 2021 02:16 )  Alb: 2.5 g/dL / Pro: 5.5 g/dL / ALK PHOS: 103 U/L / ALT: 41 U/L / AST: 53 U/L / GGT: x           PT/INR - ( 30 Dec 2021 02:16 )   PT: 14.2 sec;   INR: 1.25 ratio             hs Troponin:    ABG - ( 30 Dec 2021 02:16 )  pH, Arterial: 7.45  pH, Blood: x     /  pCO2: 40    /  pO2: 69    / HCO3: 28    / Base Excess: 3.5   /  SaO2: 93.7                02:16 - ABG - pH: 7.45  |  pCO2: 40    |  pO2: 69    | Lactate:       | BE: 3.5    21:41 - ABG - pH: 7.44  |  pCO2: 36    |  pO2: 76    | Lactate:       | BE: 0.5    16:28 - ABG - pH: 7.37  |  pCO2: 42    |  pO2: 110   | Lactate:       | BE: -1.0     CSF:    EKG:   MICROBIOLOGY:    Culture - Blood (collected 28 Dec 2021 03:25)  Source: .Blood Blood-Peripheral  Preliminary Report (29 Dec 2021 04:01):    No growth to date.    Culture - Blood (collected 28 Dec 2021 03:24)  Source: .Blood Blood-Venous  Preliminary Report (29 Dec 2021 04:01):    No growth to date.    Culture - Urine (collected 27 Dec 2021 18:31)  Source: Catheterized Catheterized  Final Report (28 Dec 2021 17:43):    >100,000 CFU/ml Streptococcus agalactiae (Group B) isolated    Group B streptococci are susceptible to ampicillin,    penicillin and cefazolin, but may be resistant to    erythromycin and clindamycin.    Recommendations for intrapartum prophylaxis for Group B    streptococci are penicillin or ampicillin.      IMAGING:      Labs, imaging, EKG personally reviewed    RADIOLOGY & ADDITIONAL TESTS: Reviewed.

## 2021-12-31 NOTE — PROGRESS NOTE ADULT - ATTENDING COMMENTS
61 F with polio, htn, hld, DM here with L hip dislocation and acute hypoxemic respiratory failure and acute hypercapnic respiratory failure due to COVID19 ARDS requiring intubation.    Keep sedated, no need for paralysis.  Vent settings, including PEEP, were adjusted.  Repeat ABG with improved po2, will wean fio2.  On empirica bx, f/u cultures  Continue with steroids  steroid induced hyperglycemia, increase NPH  distributive shock, continue with vasopressors  overall poor prognosis    Critically ill patient requiring frequent bedside visits with therapy changes.

## 2021-12-31 NOTE — PROGRESS NOTE ADULT - ASSESSMENT
62 y/o F, PMH of Polio (bedbound at baseline w/ chronic contractures), HTN, HLD, Type 2 DM (unknown if on insulin), presented to ED w/ c/o left hip pain and dry cough x 1 week, found to be Covid+ and have L hip dislocation for which ortho was consulted for which it was determined she needed no acute surgical intervention. Course complicated by persistent hypoxia in the setting of multifocal pneumonia 2/2 COVID and/or aspiration.     Neuro:  Baseline nonverbal but responds to commands. Currently intubated and sedated.   -c/w fent and versed  -added low dose prop as pt overbreathing vent and no longer bradycardic    Respiratory:   #Acute hypoxic respiratory failure-   -P/F 47 pre-intubation. Severe ARDS.   - s/p intubation on 12/27  -COVID +, was being treated with dexamethasone and remdesivir. decadron to stop 1/5, remdesivir discontinued upon arrival to MICU  -Empirically tx for CAP abx. Previously on azithro and CTX but urine legionella negative and pt febrile so coverage broadened to zosyn on 12/30   -Previous CT negative for PE, previously on lovenox BID however after discussion with pharmacy normal ppx dose is appropriate    Cardiac:   -Currently on levophed (started 12/28), titrate to MAP 65  #HTN  #Hyperlipidemia- Continue statin      GI:  Tube feeds  bowel regimen as pt on fent gtt    /Renal:  #Hypernatremia- gradually improving on free water, now on 350 q4h free water, will check q12BMP  #T2DM- Humulin 14U q6h and mod ISS given worsening hyperglycemia. Adjust humulin aggressively, if not controlled may consider insulin gtt    MSK:    #Polio- Chronic contractures. No intervention at this time. Follow-up nutrition recommendations.   #Chronic Hip dislocations- Ortho following. No acute surgical intervention needed. Will ctm.     ID:  - urine legionella neg, Azithro dc'd on 12/29  -CTX broadened to zosyn on 12/30 as pt febrile  -blood cx NGTD, repeat cultures ordered  -urine cultures growing GBS    Prophylaxis:  #Lovenox ppx    GOC:   Appreciate palliative care, pt DNR. BRANDEE signed and in chart   60 y/o F, PMH of Polio (bedbound at baseline w/ chronic contractures), HTN, HLD, Type 2 DM (unknown if on insulin), presented to ED w/ c/o left hip pain and dry cough x 1 week, found to be Covid+ and have L hip dislocation for which ortho was consulted for which it was determined she needed no acute surgical intervention. Course complicated by persistent hypoxia in the setting of multifocal pneumonia 2/2 COVID and/or aspiration.     Neuro:  Baseline nonverbal but responds to commands. Currently intubated and sedated.   -c/w on fent, versed, and prop  -will try to wean versed today    Respiratory:   #Acute hypoxic respiratory failure-   -P/F 47 pre-intubation. Severe ARDS.   - s/p intubation on 12/27  -COVID +, was being treated with dexamethasone and remdesivir. decadron to stop 1/5, remdesivir discontinued upon arrival to MICU  -Empirically tx for CAP abx. Previously on azithro and CTX but urine legionella negative and pt febrile so coverage broadened to zosyn on 12/30   -Previous CT negative for PE, previously on lovenox BID however after discussion with pharmacy normal ppx dose is appropriate      Cardiac:   -Currently on levophed (started 12/28), titrate to MAP 65  #HTN  #Hyperlipidemia- Continue statin      GI:  Tube feeds  bowel regimen as pt on fent gtt    /Renal:  #Hypernatremia- increased free water to 400 q4h and will check q12BMP  #T2DM- Humulin increased from 14 to 22 U on 12/31, will increase to 30U q6h today. If insulin requirements continue to rise, may consider gtt      MSK:  #Polio- Chronic contractures. No intervention at this time. Follow-up nutrition recommendations.   #Chronic Hip dislocations- Ortho following. No acute surgical intervention needed. Will ctm.     ID:  - urine legionella neg, Azithro dc'd on 12/29  -CTX broadened to zosyn on 12/30 as pt febrile  -blood cx NGTD, repeat cultures pending  -urine cultures growing GBS  -will order sputum culture    Prophylaxis:  #Lovenox ppx    GOC:   Appreciate palliative care, pt DNR. BRANDEE signed and in chart

## 2021-12-31 NOTE — PROGRESS NOTE ADULT - SUBJECTIVE AND OBJECTIVE BOX
INTERVAL HPI/OVERNIGHT EVENTS:    SUBJECTIVE: Patient seen and examined at bedside.     OBJECTIVE:    VITAL SIGNS:  ICU Vital Signs Last 24 Hrs  T(C): 37.6 (31 Dec 2021 00:00), Max: 38.3 (30 Dec 2021 08:00)  T(F): 99.6 (31 Dec 2021 00:00), Max: 101 (30 Dec 2021 08:00)  HR: 53 (31 Dec 2021 07:00) (53 - 84)  BP: --  BP(mean): --  ABP: 130/68 (31 Dec 2021 07:00) (80/48 - 132/74)  ABP(mean): 91 (31 Dec 2021 07:00) (62 - 96)  RR: 16 (31 Dec 2021 07:00) (16 - 32)  SpO2: 100% (31 Dec 2021 07:00) (84% - 100%)    Mode: AC/ CMV (Assist Control/ Continuous Mandatory Ventilation), RR (machine): 16, TV (machine): 300, FiO2: 80, PEEP: 5, MAP: 10, PIP: 27    12-30 @ 07:01  -  12-31 @ 07:00  --------------------------------------------------------  IN: 2531 mL / OUT: 1165 mL / NET: 1366 mL      CAPILLARY BLOOD GLUCOSE      POCT Blood Glucose.: 331 mg/dL (31 Dec 2021 05:56)      PHYSICAL EXAM:    General: NAD  HEENT: NC/AT; PERRL, clear conjunctiva  Neck: supple  Respiratory: CTA b/l  Cardiovascular: +S1/S2; RRR  Abdomen: soft, NT/ND; +BS x4  Extremities: WWP, 2+ peripheral pulses b/l; no LE edema  Skin: normal color and turgor; no rash  Neurological:    MEDICATIONS:  MEDICATIONS  (STANDING):  chlorhexidine 0.12% Liquid 15 milliLiter(s) Oral Mucosa every 12 hours  chlorhexidine 2% Cloths 1 Application(s) Topical daily  chlorhexidine 4% Liquid 1 Application(s) Topical <User Schedule>  dexAMETHasone  Injectable 6 milliGRAM(s) IV Push daily  dextrose 40% Gel 15 Gram(s) Oral once  dextrose 5%. 1000 milliLiter(s) (50 mL/Hr) IV Continuous <Continuous>  dextrose 5%. 1000 milliLiter(s) (100 mL/Hr) IV Continuous <Continuous>  dextrose 50% Injectable 25 Gram(s) IV Push once  dextrose 50% Injectable 12.5 Gram(s) IV Push once  dextrose 50% Injectable 25 Gram(s) IV Push once  enoxaparin Injectable 40 milliGRAM(s) SubCutaneous every 24 hours  fentaNYL   Infusion. 2 MICROgram(s)/kG/Hr (8.36 mL/Hr) IV Continuous <Continuous>  glucagon  Injectable 1 milliGRAM(s) IntraMuscular once  insulin lispro (ADMELOG) corrective regimen sliding scale   SubCutaneous every 6 hours  insulin NPH human recombinant 22 Unit(s) SubCutaneous every 6 hours  lidocaine   4% Patch 1 Patch Transdermal daily  midazolam Infusion 0.02 mG/kG/Hr (0.84 mL/Hr) IV Continuous <Continuous>  multivitamin 1 Tablet(s) Oral daily  mupirocin 2% Ointment 1 Application(s) Topical two times a day  norepinephrine Infusion 0.05 MICROgram(s)/kG/Min (3.92 mL/Hr) IV Continuous <Continuous>  petrolatum Ophthalmic Ointment 1 Application(s) Both EYES two times a day  piperacillin/tazobactam IVPB.. 3.375 Gram(s) IV Intermittent every 8 hours  polyethylene glycol 3350 17 Gram(s) Oral daily  propofol Infusion 30 MICROgram(s)/kG/Min (7.52 mL/Hr) IV Continuous <Continuous>  senna Syrup 10 milliLiter(s) Oral daily    MEDICATIONS  (PRN):  acetaminophen     Tablet .. 650 milliGRAM(s) Oral every 6 hours PRN Temp greater or equal to 38C (100.4F)  ALBUTerol    90 MICROgram(s) HFA Inhaler 2 Puff(s) Inhalation every 4 hours PRN Shortness of Breath and/or Wheezing      ALLERGIES:  Allergies    No Known Allergies    Intolerances        LABS:                        9.2    27.93 )-----------( 132      ( 31 Dec 2021 06:55 )             29.0     Hemoglobin: 9.2 g/dL ( @ 06:55)  Hemoglobin: 9.9 g/dL ( @ 02:16)  Hemoglobin: 10.4 g/dL ( @ 01:32)  Hemoglobin: 10.5 g/dL ( @ 06:21)  Hemoglobin: 12.5 g/dL ( @ 21:50)    CBC Full  -  ( 31 Dec 2021 06:55 )  WBC Count : 27.93 K/uL  RBC Count : 3.09 M/uL  Hemoglobin : 9.2 g/dL  Hematocrit : 29.0 %  Platelet Count - Automated : 132 K/uL  Mean Cell Volume : 93.9 fL  Mean Cell Hemoglobin : 29.8 pg  Mean Cell Hemoglobin Concentration : 31.7 gm/dL  Auto Neutrophil # : 26.08 K/uL  Auto Lymphocyte # : 0.64 K/uL  Auto Monocyte # : 0.71 K/uL  Auto Eosinophil # : 0.00 K/uL  Auto Basophil # : 0.04 K/uL  Auto Neutrophil % : 93.5 %  Auto Lymphocyte % : 2.3 %  Auto Monocyte % : 2.5 %  Auto Eosinophil % : 0.0 %  Auto Basophil % : 0.1 %        150<H>  |  114<H>  |  27<H>  ----------------------------<  365<H>  4.9   |  31  |  0.72    Ca    7.9<L>      31 Dec 2021 06:43  Phos  3.3       Mg     2.70         TPro  5.5<L>  /  Alb  2.5<L>  /  TBili  0.4  /  DBili  0.2  /  AST  42<H>  /  ALT  33  /  AlkPhos  95      Creatinine Trend: 0.72<--, 0.84<--, 0.69<--, 0.69<--, 0.75<--, 0.80<--  LIVER FUNCTIONS - ( 31 Dec 2021 06:43 )  Alb: 2.5 g/dL / Pro: 5.5 g/dL / ALK PHOS: 95 U/L / ALT: 33 U/L / AST: 42 U/L / GGT: x           PT/INR - ( 31 Dec 2021 06:42 )   PT: 11.9 sec;   INR: 1.04 ratio             hs Troponin:    ABG - ( 31 Dec 2021 06:40 )  pH, Arterial: 7.34  pH, Blood: x     /  pCO2: 58    /  pO2: 76    / HCO3: 31    / Base Excess: 4.5   /  SaO2: 94.7                06:40 - ABG - pH: 7.34  |  pCO2: 58    |  pO2: 76    | Lactate:       | BE: 4.5        Urinalysis Basic - ( 30 Dec 2021 12:34 )    Color: Yellow / Appearance: Clear / S.023 / pH: x  Gluc: x / Ketone: Negative  / Bili: Negative / Urobili: <2 mg/dL   Blood: x / Protein: 30 mg/dL / Nitrite: Negative   Leuk Esterase: Negative / RBC: 2-5 /HPF / WBC 0-2 /HPF   Sq Epi: x / Non Sq Epi: Occasional / Bacteria: x      CSF:                      EKG:   MICROBIOLOGY:    IMAGING:      Labs, imaging, EKG personally reviewed    RADIOLOGY & ADDITIONAL TESTS: Reviewed.     INTERVAL HPI/OVERNIGHT EVENTS: No acute events overnight    SUBJECTIVE: Patient seen and examined at bedside. Patient sedated and intubated.     OBJECTIVE:    VITAL SIGNS:  ICU Vital Signs Last 24 Hrs  T(C): 37.6 (31 Dec 2021 00:00), Max: 38.3 (30 Dec 2021 08:00)  T(F): 99.6 (31 Dec 2021 00:00), Max: 101 (30 Dec 2021 08:00)  HR: 53 (31 Dec 2021 07:00) (53 - 84)  BP: --  BP(mean): --  ABP: 130/68 (31 Dec 2021 07:00) (80/48 - 132/74)  ABP(mean): 91 (31 Dec 2021 07:00) (62 - 96)  RR: 16 (31 Dec 2021 07:00) (16 - 32)  SpO2: 100% (31 Dec 2021 07:00) (84% - 100%)    Mode: AC/ CMV (Assist Control/ Continuous Mandatory Ventilation), RR (machine): 16, TV (machine): 300, FiO2: 80, PEEP: 5, MAP: 10, PIP: 27    12-30 @ 07:01  -  12-31 @ 07:00  --------------------------------------------------------  IN: 2531 mL / OUT: 1165 mL / NET: 1366 mL      CAPILLARY BLOOD GLUCOSE      POCT Blood Glucose.: 331 mg/dL (31 Dec 2021 05:56)      PHYSICAL EXAM:  General: sedated, intubated, contractures  HEENT: NC/AT; PERRL, clear conjunctiva  Neck: supple  Respiratory: CTA b/l, mild accessory muscle usage  Cardiovascular: +S1/S2; RRR  Abdomen: soft, NT/ND; +BS x4  Extremities: WWP, 2+ peripheral pulses b/l; no LE edema, notable muscle wasting in lower extremities  Skin: normal color and turgor; no rash  Neurological: unable to assess    MEDICATIONS:  MEDICATIONS  (STANDING):  chlorhexidine 0.12% Liquid 15 milliLiter(s) Oral Mucosa every 12 hours  chlorhexidine 2% Cloths 1 Application(s) Topical daily  chlorhexidine 4% Liquid 1 Application(s) Topical <User Schedule>  dexAMETHasone  Injectable 6 milliGRAM(s) IV Push daily  dextrose 40% Gel 15 Gram(s) Oral once  dextrose 5%. 1000 milliLiter(s) (50 mL/Hr) IV Continuous <Continuous>  dextrose 5%. 1000 milliLiter(s) (100 mL/Hr) IV Continuous <Continuous>  dextrose 50% Injectable 25 Gram(s) IV Push once  dextrose 50% Injectable 12.5 Gram(s) IV Push once  dextrose 50% Injectable 25 Gram(s) IV Push once  enoxaparin Injectable 40 milliGRAM(s) SubCutaneous every 24 hours  fentaNYL   Infusion. 2 MICROgram(s)/kG/Hr (8.36 mL/Hr) IV Continuous <Continuous>  glucagon  Injectable 1 milliGRAM(s) IntraMuscular once  insulin lispro (ADMELOG) corrective regimen sliding scale   SubCutaneous every 6 hours  insulin NPH human recombinant 22 Unit(s) SubCutaneous every 6 hours  lidocaine   4% Patch 1 Patch Transdermal daily  midazolam Infusion 0.02 mG/kG/Hr (0.84 mL/Hr) IV Continuous <Continuous>  multivitamin 1 Tablet(s) Oral daily  mupirocin 2% Ointment 1 Application(s) Topical two times a day  norepinephrine Infusion 0.05 MICROgram(s)/kG/Min (3.92 mL/Hr) IV Continuous <Continuous>  petrolatum Ophthalmic Ointment 1 Application(s) Both EYES two times a day  piperacillin/tazobactam IVPB.. 3.375 Gram(s) IV Intermittent every 8 hours  polyethylene glycol 3350 17 Gram(s) Oral daily  propofol Infusion 30 MICROgram(s)/kG/Min (7.52 mL/Hr) IV Continuous <Continuous>  senna Syrup 10 milliLiter(s) Oral daily    MEDICATIONS  (PRN):  acetaminophen     Tablet .. 650 milliGRAM(s) Oral every 6 hours PRN Temp greater or equal to 38C (100.4F)  ALBUTerol    90 MICROgram(s) HFA Inhaler 2 Puff(s) Inhalation every 4 hours PRN Shortness of Breath and/or Wheezing      ALLERGIES:  Allergies    No Known Allergies    Intolerances        LABS:                        9.2    27.93 )-----------( 132      ( 31 Dec 2021 06:55 )             29.0     Hemoglobin: 9.2 g/dL (- @ 06:55)  Hemoglobin: 9.9 g/dL ( @ 02:16)  Hemoglobin: 10.4 g/dL ( @ 01:32)  Hemoglobin: 10.5 g/dL ( @ 06:21)  Hemoglobin: 12.5 g/dL ( @ 21:50)    CBC Full  -  ( 31 Dec 2021 06:55 )  WBC Count : 27.93 K/uL  RBC Count : 3.09 M/uL  Hemoglobin : 9.2 g/dL  Hematocrit : 29.0 %  Platelet Count - Automated : 132 K/uL  Mean Cell Volume : 93.9 fL  Mean Cell Hemoglobin : 29.8 pg  Mean Cell Hemoglobin Concentration : 31.7 gm/dL  Auto Neutrophil # : 26.08 K/uL  Auto Lymphocyte # : 0.64 K/uL  Auto Monocyte # : 0.71 K/uL  Auto Eosinophil # : 0.00 K/uL  Auto Basophil # : 0.04 K/uL  Auto Neutrophil % : 93.5 %  Auto Lymphocyte % : 2.3 %  Auto Monocyte % : 2.5 %  Auto Eosinophil % : 0.0 %  Auto Basophil % : 0.1 %        150<H>  |  114<H>  |  27<H>  ----------------------------<  365<H>  4.9   |  31  |  0.72    Ca    7.9<L>      31 Dec 2021 06:43  Phos  3.3       Mg     2.70         TPro  5.5<L>  /  Alb  2.5<L>  /  TBili  0.4  /  DBili  0.2  /  AST  42<H>  /  ALT  33  /  AlkPhos  95      Creatinine Trend: 0.72<--, 0.84<--, 0.69<--, 0.69<--, 0.75<--, 0.80<--  LIVER FUNCTIONS - ( 31 Dec 2021 06:43 )  Alb: 2.5 g/dL / Pro: 5.5 g/dL / ALK PHOS: 95 U/L / ALT: 33 U/L / AST: 42 U/L / GGT: x           PT/INR - ( 31 Dec 2021 06:42 )   PT: 11.9 sec;   INR: 1.04 ratio             hs Troponin:    ABG - ( 31 Dec 2021 06:40 )  pH, Arterial: 7.34  pH, Blood: x     /  pCO2: 58    /  pO2: 76    / HCO3: 31    / Base Excess: 4.5   /  SaO2: 94.7                06:40 - ABG - pH: 7.34  |  pCO2: 58    |  pO2: 76    | Lactate:       | BE: 4.5        Urinalysis Basic - ( 30 Dec 2021 12:34 )    Color: Yellow / Appearance: Clear / S.023 / pH: x  Gluc: x / Ketone: Negative  / Bili: Negative / Urobili: <2 mg/dL   Blood: x / Protein: 30 mg/dL / Nitrite: Negative   Leuk Esterase: Negative / RBC: 2-5 /HPF / WBC 0-2 /HPF   Sq Epi: x / Non Sq Epi: Occasional / Bacteria: x      CSF:      EKG:   MICROBIOLOGY:    IMAGING:      Labs, imaging, EKG personally reviewed    RADIOLOGY & ADDITIONAL TESTS: Reviewed.

## 2022-01-01 LAB
ALBUMIN SERPL ELPH-MCNC: 1.9 G/DL — LOW (ref 3.3–5)
ALBUMIN SERPL ELPH-MCNC: 2.1 G/DL — LOW (ref 3.3–5)
ALBUMIN SERPL ELPH-MCNC: 2.1 G/DL — LOW (ref 3.3–5)
ALBUMIN SERPL ELPH-MCNC: 2.2 G/DL — LOW (ref 3.3–5)
ALBUMIN SERPL ELPH-MCNC: 2.3 G/DL — LOW (ref 3.3–5)
ALBUMIN SERPL ELPH-MCNC: 2.4 G/DL — LOW (ref 3.3–5)
ALBUMIN SERPL ELPH-MCNC: 2.9 G/DL — LOW (ref 3.3–5)
ALP SERPL-CCNC: 111 U/L — SIGNIFICANT CHANGE UP (ref 40–120)
ALP SERPL-CCNC: 115 U/L — SIGNIFICANT CHANGE UP (ref 40–120)
ALP SERPL-CCNC: 116 U/L — SIGNIFICANT CHANGE UP (ref 40–120)
ALP SERPL-CCNC: 123 U/L — HIGH (ref 40–120)
ALP SERPL-CCNC: 124 U/L — HIGH (ref 40–120)
ALP SERPL-CCNC: 127 U/L — HIGH (ref 40–120)
ALP SERPL-CCNC: 138 U/L — HIGH (ref 40–120)
ALP SERPL-CCNC: 73 U/L — SIGNIFICANT CHANGE UP (ref 40–120)
ALP SERPL-CCNC: 92 U/L — SIGNIFICANT CHANGE UP (ref 40–120)
ALP SERPL-CCNC: 92 U/L — SIGNIFICANT CHANGE UP (ref 40–120)
ALT FLD-CCNC: 136 U/L — HIGH (ref 4–33)
ALT FLD-CCNC: 156 U/L — HIGH (ref 4–33)
ALT FLD-CCNC: 158 U/L — HIGH (ref 4–33)
ALT FLD-CCNC: 24 U/L — SIGNIFICANT CHANGE UP (ref 4–33)
ALT FLD-CCNC: 51 U/L — HIGH (ref 4–33)
ALT FLD-CCNC: 69 U/L — HIGH (ref 4–33)
ALT FLD-CCNC: 71 U/L — HIGH (ref 4–33)
ALT FLD-CCNC: 73 U/L — HIGH (ref 4–33)
ALT FLD-CCNC: 96 U/L — HIGH (ref 4–33)
ALT FLD-CCNC: 96 U/L — HIGH (ref 4–33)
ANION GAP SERPL CALC-SCNC: 10 MMOL/L — SIGNIFICANT CHANGE UP (ref 7–14)
ANION GAP SERPL CALC-SCNC: 10 MMOL/L — SIGNIFICANT CHANGE UP (ref 7–14)
ANION GAP SERPL CALC-SCNC: 11 MMOL/L — SIGNIFICANT CHANGE UP (ref 7–14)
ANION GAP SERPL CALC-SCNC: 11 MMOL/L — SIGNIFICANT CHANGE UP (ref 7–14)
ANION GAP SERPL CALC-SCNC: 5 MMOL/L — LOW (ref 7–14)
ANION GAP SERPL CALC-SCNC: 7 MMOL/L — SIGNIFICANT CHANGE UP (ref 7–14)
ANION GAP SERPL CALC-SCNC: 7 MMOL/L — SIGNIFICANT CHANGE UP (ref 7–14)
ANION GAP SERPL CALC-SCNC: 8 MMOL/L — SIGNIFICANT CHANGE UP (ref 7–14)
ANION GAP SERPL CALC-SCNC: 8 MMOL/L — SIGNIFICANT CHANGE UP (ref 7–14)
ANION GAP SERPL CALC-SCNC: 9 MMOL/L — SIGNIFICANT CHANGE UP (ref 7–14)
APPEARANCE UR: CLEAR — SIGNIFICANT CHANGE UP
AST SERPL-CCNC: 141 U/L — HIGH (ref 4–32)
AST SERPL-CCNC: 145 U/L — HIGH (ref 4–32)
AST SERPL-CCNC: 181 U/L — HIGH (ref 4–32)
AST SERPL-CCNC: 204 U/L — HIGH (ref 4–32)
AST SERPL-CCNC: 30 U/L — SIGNIFICANT CHANGE UP (ref 4–32)
AST SERPL-CCNC: 61 U/L — HIGH (ref 4–32)
AST SERPL-CCNC: 62 U/L — HIGH (ref 4–32)
AST SERPL-CCNC: 63 U/L — HIGH (ref 4–32)
AST SERPL-CCNC: 74 U/L — HIGH (ref 4–32)
AST SERPL-CCNC: 76 U/L — HIGH (ref 4–32)
BILIRUB DIRECT SERPL-MCNC: 0.5 MG/DL — HIGH (ref 0–0.3)
BILIRUB DIRECT SERPL-MCNC: <0.2 MG/DL — SIGNIFICANT CHANGE UP (ref 0–0.3)
BILIRUB INDIRECT FLD-MCNC: 0.2 MG/DL — SIGNIFICANT CHANGE UP (ref 0–1)
BILIRUB INDIRECT FLD-MCNC: >0.1 MG/DL — SIGNIFICANT CHANGE UP (ref 0–1)
BILIRUB SERPL-MCNC: 0.3 MG/DL — SIGNIFICANT CHANGE UP (ref 0.2–1.2)
BILIRUB SERPL-MCNC: 0.4 MG/DL — SIGNIFICANT CHANGE UP (ref 0.2–1.2)
BILIRUB SERPL-MCNC: 0.6 MG/DL — SIGNIFICANT CHANGE UP (ref 0.2–1.2)
BILIRUB SERPL-MCNC: 0.7 MG/DL — SIGNIFICANT CHANGE UP (ref 0.2–1.2)
BILIRUB SERPL-MCNC: 0.7 MG/DL — SIGNIFICANT CHANGE UP (ref 0.2–1.2)
BILIRUB SERPL-MCNC: 0.9 MG/DL — SIGNIFICANT CHANGE UP (ref 0.2–1.2)
BILIRUB UR-MCNC: NEGATIVE — SIGNIFICANT CHANGE UP
BLD GP AB SCN SERPL QL: NEGATIVE — SIGNIFICANT CHANGE UP
BLOOD GAS ARTERIAL - LYTES,HGB,ICA,LACT RESULT: SIGNIFICANT CHANGE UP
BLOOD GAS ARTERIAL - LYTES,HGB,ICA,LACT RESULT: SIGNIFICANT CHANGE UP
BLOOD GAS ARTERIAL COMPREHENSIVE RESULT: SIGNIFICANT CHANGE UP
BUN SERPL-MCNC: 18 MG/DL — SIGNIFICANT CHANGE UP (ref 7–23)
BUN SERPL-MCNC: 23 MG/DL — SIGNIFICANT CHANGE UP (ref 7–23)
BUN SERPL-MCNC: 24 MG/DL — HIGH (ref 7–23)
BUN SERPL-MCNC: 25 MG/DL — HIGH (ref 7–23)
BUN SERPL-MCNC: 26 MG/DL — HIGH (ref 7–23)
BUN SERPL-MCNC: 27 MG/DL — HIGH (ref 7–23)
BUN SERPL-MCNC: 27 MG/DL — HIGH (ref 7–23)
BUN SERPL-MCNC: 28 MG/DL — HIGH (ref 7–23)
BUN SERPL-MCNC: 30 MG/DL — HIGH (ref 7–23)
BUN SERPL-MCNC: 30 MG/DL — HIGH (ref 7–23)
C DIFF BY PCR RESULT: SIGNIFICANT CHANGE UP
C DIFF TOX GENS STL QL NAA+PROBE: SIGNIFICANT CHANGE UP
CALCIUM SERPL-MCNC: 8.3 MG/DL — LOW (ref 8.4–10.5)
CALCIUM SERPL-MCNC: 8.4 MG/DL — SIGNIFICANT CHANGE UP (ref 8.4–10.5)
CALCIUM SERPL-MCNC: 8.5 MG/DL — SIGNIFICANT CHANGE UP (ref 8.4–10.5)
CALCIUM SERPL-MCNC: 9 MG/DL — SIGNIFICANT CHANGE UP (ref 8.4–10.5)
CALCIUM SERPL-MCNC: 9.2 MG/DL — SIGNIFICANT CHANGE UP (ref 8.4–10.5)
CALCIUM SERPL-MCNC: 9.2 MG/DL — SIGNIFICANT CHANGE UP (ref 8.4–10.5)
CALCIUM SERPL-MCNC: 9.3 MG/DL — SIGNIFICANT CHANGE UP (ref 8.4–10.5)
CHLORIDE SERPL-SCNC: 107 MMOL/L — SIGNIFICANT CHANGE UP (ref 98–107)
CHLORIDE SERPL-SCNC: 108 MMOL/L — HIGH (ref 98–107)
CHLORIDE SERPL-SCNC: 110 MMOL/L — HIGH (ref 98–107)
CHLORIDE SERPL-SCNC: 113 MMOL/L — HIGH (ref 98–107)
CHLORIDE SERPL-SCNC: 114 MMOL/L — HIGH (ref 98–107)
CHLORIDE SERPL-SCNC: 116 MMOL/L — HIGH (ref 98–107)
CHLORIDE SERPL-SCNC: 116 MMOL/L — HIGH (ref 98–107)
CHLORIDE SERPL-SCNC: 117 MMOL/L — HIGH (ref 98–107)
CO2 SERPL-SCNC: 25 MMOL/L — SIGNIFICANT CHANGE UP (ref 22–31)
CO2 SERPL-SCNC: 26 MMOL/L — SIGNIFICANT CHANGE UP (ref 22–31)
CO2 SERPL-SCNC: 26 MMOL/L — SIGNIFICANT CHANGE UP (ref 22–31)
CO2 SERPL-SCNC: 27 MMOL/L — SIGNIFICANT CHANGE UP (ref 22–31)
CO2 SERPL-SCNC: 27 MMOL/L — SIGNIFICANT CHANGE UP (ref 22–31)
CO2 SERPL-SCNC: 28 MMOL/L — SIGNIFICANT CHANGE UP (ref 22–31)
CO2 SERPL-SCNC: 29 MMOL/L — SIGNIFICANT CHANGE UP (ref 22–31)
CO2 SERPL-SCNC: 29 MMOL/L — SIGNIFICANT CHANGE UP (ref 22–31)
CO2 SERPL-SCNC: 31 MMOL/L — SIGNIFICANT CHANGE UP (ref 22–31)
CO2 SERPL-SCNC: 32 MMOL/L — HIGH (ref 22–31)
COLOR SPEC: SIGNIFICANT CHANGE UP
CREAT SERPL-MCNC: 0.49 MG/DL — LOW (ref 0.5–1.3)
CREAT SERPL-MCNC: 0.49 MG/DL — LOW (ref 0.5–1.3)
CREAT SERPL-MCNC: 0.57 MG/DL — SIGNIFICANT CHANGE UP (ref 0.5–1.3)
CREAT SERPL-MCNC: 0.6 MG/DL — SIGNIFICANT CHANGE UP (ref 0.5–1.3)
CREAT SERPL-MCNC: 0.61 MG/DL — SIGNIFICANT CHANGE UP (ref 0.5–1.3)
CREAT SERPL-MCNC: 0.67 MG/DL — SIGNIFICANT CHANGE UP (ref 0.5–1.3)
CREAT SERPL-MCNC: 0.67 MG/DL — SIGNIFICANT CHANGE UP (ref 0.5–1.3)
CREAT SERPL-MCNC: 0.69 MG/DL — SIGNIFICANT CHANGE UP (ref 0.5–1.3)
CULTURE RESULTS: SIGNIFICANT CHANGE UP
DIFF PNL FLD: ABNORMAL
GAS PNL BLDA: SIGNIFICANT CHANGE UP
GLUCOSE BLDC GLUCOMTR-MCNC: 102 MG/DL — HIGH (ref 70–99)
GLUCOSE BLDC GLUCOMTR-MCNC: 104 MG/DL — HIGH (ref 70–99)
GLUCOSE BLDC GLUCOMTR-MCNC: 122 MG/DL — HIGH (ref 70–99)
GLUCOSE BLDC GLUCOMTR-MCNC: 124 MG/DL — HIGH (ref 70–99)
GLUCOSE BLDC GLUCOMTR-MCNC: 125 MG/DL — HIGH (ref 70–99)
GLUCOSE BLDC GLUCOMTR-MCNC: 127 MG/DL — HIGH (ref 70–99)
GLUCOSE BLDC GLUCOMTR-MCNC: 136 MG/DL — HIGH (ref 70–99)
GLUCOSE BLDC GLUCOMTR-MCNC: 138 MG/DL — HIGH (ref 70–99)
GLUCOSE BLDC GLUCOMTR-MCNC: 144 MG/DL — HIGH (ref 70–99)
GLUCOSE BLDC GLUCOMTR-MCNC: 162 MG/DL — HIGH (ref 70–99)
GLUCOSE BLDC GLUCOMTR-MCNC: 167 MG/DL — HIGH (ref 70–99)
GLUCOSE BLDC GLUCOMTR-MCNC: 169 MG/DL — HIGH (ref 70–99)
GLUCOSE BLDC GLUCOMTR-MCNC: 170 MG/DL — HIGH (ref 70–99)
GLUCOSE BLDC GLUCOMTR-MCNC: 171 MG/DL — HIGH (ref 70–99)
GLUCOSE BLDC GLUCOMTR-MCNC: 175 MG/DL — HIGH (ref 70–99)
GLUCOSE BLDC GLUCOMTR-MCNC: 177 MG/DL — HIGH (ref 70–99)
GLUCOSE BLDC GLUCOMTR-MCNC: 179 MG/DL — HIGH (ref 70–99)
GLUCOSE BLDC GLUCOMTR-MCNC: 182 MG/DL — HIGH (ref 70–99)
GLUCOSE BLDC GLUCOMTR-MCNC: 184 MG/DL — HIGH (ref 70–99)
GLUCOSE BLDC GLUCOMTR-MCNC: 186 MG/DL — HIGH (ref 70–99)
GLUCOSE BLDC GLUCOMTR-MCNC: 188 MG/DL — HIGH (ref 70–99)
GLUCOSE BLDC GLUCOMTR-MCNC: 193 MG/DL — HIGH (ref 70–99)
GLUCOSE BLDC GLUCOMTR-MCNC: 194 MG/DL — HIGH (ref 70–99)
GLUCOSE BLDC GLUCOMTR-MCNC: 194 MG/DL — HIGH (ref 70–99)
GLUCOSE BLDC GLUCOMTR-MCNC: 200 MG/DL — HIGH (ref 70–99)
GLUCOSE BLDC GLUCOMTR-MCNC: 206 MG/DL — HIGH (ref 70–99)
GLUCOSE BLDC GLUCOMTR-MCNC: 206 MG/DL — HIGH (ref 70–99)
GLUCOSE BLDC GLUCOMTR-MCNC: 209 MG/DL — HIGH (ref 70–99)
GLUCOSE BLDC GLUCOMTR-MCNC: 234 MG/DL — HIGH (ref 70–99)
GLUCOSE BLDC GLUCOMTR-MCNC: 236 MG/DL — HIGH (ref 70–99)
GLUCOSE BLDC GLUCOMTR-MCNC: 241 MG/DL — HIGH (ref 70–99)
GLUCOSE BLDC GLUCOMTR-MCNC: 248 MG/DL — HIGH (ref 70–99)
GLUCOSE BLDC GLUCOMTR-MCNC: 259 MG/DL — HIGH (ref 70–99)
GLUCOSE BLDC GLUCOMTR-MCNC: 282 MG/DL — HIGH (ref 70–99)
GLUCOSE BLDC GLUCOMTR-MCNC: 291 MG/DL — HIGH (ref 70–99)
GLUCOSE BLDC GLUCOMTR-MCNC: 42 MG/DL — CRITICAL LOW (ref 70–99)
GLUCOSE BLDC GLUCOMTR-MCNC: 44 MG/DL — CRITICAL LOW (ref 70–99)
GLUCOSE BLDC GLUCOMTR-MCNC: 46 MG/DL — CRITICAL LOW (ref 70–99)
GLUCOSE BLDC GLUCOMTR-MCNC: 47 MG/DL — CRITICAL LOW (ref 70–99)
GLUCOSE BLDC GLUCOMTR-MCNC: 57 MG/DL — LOW (ref 70–99)
GLUCOSE BLDC GLUCOMTR-MCNC: 63 MG/DL — LOW (ref 70–99)
GLUCOSE BLDC GLUCOMTR-MCNC: 82 MG/DL — SIGNIFICANT CHANGE UP (ref 70–99)
GLUCOSE BLDC GLUCOMTR-MCNC: 82 MG/DL — SIGNIFICANT CHANGE UP (ref 70–99)
GLUCOSE BLDC GLUCOMTR-MCNC: 87 MG/DL — SIGNIFICANT CHANGE UP (ref 70–99)
GLUCOSE BLDC GLUCOMTR-MCNC: 88 MG/DL — SIGNIFICANT CHANGE UP (ref 70–99)
GLUCOSE BLDC GLUCOMTR-MCNC: 89 MG/DL — SIGNIFICANT CHANGE UP (ref 70–99)
GLUCOSE BLDC GLUCOMTR-MCNC: 95 MG/DL — SIGNIFICANT CHANGE UP (ref 70–99)
GLUCOSE SERPL-MCNC: 128 MG/DL — HIGH (ref 70–99)
GLUCOSE SERPL-MCNC: 162 MG/DL — HIGH (ref 70–99)
GLUCOSE SERPL-MCNC: 178 MG/DL — HIGH (ref 70–99)
GLUCOSE SERPL-MCNC: 179 MG/DL — HIGH (ref 70–99)
GLUCOSE SERPL-MCNC: 180 MG/DL — HIGH (ref 70–99)
GLUCOSE SERPL-MCNC: 217 MG/DL — HIGH (ref 70–99)
GLUCOSE SERPL-MCNC: 257 MG/DL — HIGH (ref 70–99)
GLUCOSE SERPL-MCNC: 302 MG/DL — HIGH (ref 70–99)
GLUCOSE SERPL-MCNC: 70 MG/DL — SIGNIFICANT CHANGE UP (ref 70–99)
GLUCOSE SERPL-MCNC: 81 MG/DL — SIGNIFICANT CHANGE UP (ref 70–99)
GLUCOSE UR QL: ABNORMAL
GRAM STN FLD: SIGNIFICANT CHANGE UP
GRAM STN FLD: SIGNIFICANT CHANGE UP
HCT VFR BLD CALC: 22 % — LOW (ref 34.5–45)
HCT VFR BLD CALC: 22.3 % — LOW (ref 34.5–45)
HCT VFR BLD CALC: 23.5 % — LOW (ref 34.5–45)
HCT VFR BLD CALC: 23.7 % — LOW (ref 34.5–45)
HCT VFR BLD CALC: 24 % — LOW (ref 34.5–45)
HCT VFR BLD CALC: 24.4 % — LOW (ref 34.5–45)
HCT VFR BLD CALC: 24.5 % — LOW (ref 34.5–45)
HCT VFR BLD CALC: 24.9 % — LOW (ref 34.5–45)
HCT VFR BLD CALC: 25.5 % — LOW (ref 34.5–45)
HCT VFR BLD CALC: 27.6 % — LOW (ref 34.5–45)
HGB BLD-MCNC: 6.5 G/DL — CRITICAL LOW (ref 11.5–15.5)
HGB BLD-MCNC: 6.5 G/DL — CRITICAL LOW (ref 11.5–15.5)
HGB BLD-MCNC: 7.2 G/DL — LOW (ref 11.5–15.5)
HGB BLD-MCNC: 7.2 G/DL — LOW (ref 11.5–15.5)
HGB BLD-MCNC: 7.5 G/DL — LOW (ref 11.5–15.5)
HGB BLD-MCNC: 7.6 G/DL — LOW (ref 11.5–15.5)
HGB BLD-MCNC: 7.7 G/DL — LOW (ref 11.5–15.5)
HGB BLD-MCNC: 8.3 G/DL — LOW (ref 11.5–15.5)
HGB BLD-MCNC: 8.4 G/DL — LOW (ref 11.5–15.5)
HGB BLD-MCNC: 8.9 G/DL — LOW (ref 11.5–15.5)
INR BLD: 0.94 RATIO — SIGNIFICANT CHANGE UP (ref 0.88–1.16)
INR BLD: 0.97 RATIO — SIGNIFICANT CHANGE UP (ref 0.88–1.16)
INR BLD: 1 RATIO — SIGNIFICANT CHANGE UP (ref 0.88–1.16)
KETONES UR-MCNC: NEGATIVE — SIGNIFICANT CHANGE UP
LEUKOCYTE ESTERASE UR-ACNC: NEGATIVE — SIGNIFICANT CHANGE UP
MAGNESIUM SERPL-MCNC: 2 MG/DL — SIGNIFICANT CHANGE UP (ref 1.6–2.6)
MAGNESIUM SERPL-MCNC: 2.1 MG/DL — SIGNIFICANT CHANGE UP (ref 1.6–2.6)
MAGNESIUM SERPL-MCNC: 2.2 MG/DL — SIGNIFICANT CHANGE UP (ref 1.6–2.6)
MAGNESIUM SERPL-MCNC: 2.4 MG/DL — SIGNIFICANT CHANGE UP (ref 1.6–2.6)
MAGNESIUM SERPL-MCNC: 2.6 MG/DL — SIGNIFICANT CHANGE UP (ref 1.6–2.6)
MAGNESIUM SERPL-MCNC: 2.7 MG/DL — HIGH (ref 1.6–2.6)
MCHC RBC-ENTMCNC: 27.7 PG — SIGNIFICANT CHANGE UP (ref 27–34)
MCHC RBC-ENTMCNC: 27.8 PG — SIGNIFICANT CHANGE UP (ref 27–34)
MCHC RBC-ENTMCNC: 27.9 PG — SIGNIFICANT CHANGE UP (ref 27–34)
MCHC RBC-ENTMCNC: 28.2 PG — SIGNIFICANT CHANGE UP (ref 27–34)
MCHC RBC-ENTMCNC: 28.2 PG — SIGNIFICANT CHANGE UP (ref 27–34)
MCHC RBC-ENTMCNC: 28.3 PG — SIGNIFICANT CHANGE UP (ref 27–34)
MCHC RBC-ENTMCNC: 28.4 PG — SIGNIFICANT CHANGE UP (ref 27–34)
MCHC RBC-ENTMCNC: 28.5 PG — SIGNIFICANT CHANGE UP (ref 27–34)
MCHC RBC-ENTMCNC: 28.7 PG — SIGNIFICANT CHANGE UP (ref 27–34)
MCHC RBC-ENTMCNC: 29.1 GM/DL — LOW (ref 32–36)
MCHC RBC-ENTMCNC: 29.5 GM/DL — LOW (ref 32–36)
MCHC RBC-ENTMCNC: 29.7 PG — SIGNIFICANT CHANGE UP (ref 27–34)
MCHC RBC-ENTMCNC: 30 GM/DL — LOW (ref 32–36)
MCHC RBC-ENTMCNC: 30.6 GM/DL — LOW (ref 32–36)
MCHC RBC-ENTMCNC: 31 GM/DL — LOW (ref 32–36)
MCHC RBC-ENTMCNC: 31.6 GM/DL — LOW (ref 32–36)
MCHC RBC-ENTMCNC: 31.6 GM/DL — LOW (ref 32–36)
MCHC RBC-ENTMCNC: 32.2 GM/DL — SIGNIFICANT CHANGE UP (ref 32–36)
MCHC RBC-ENTMCNC: 32.9 GM/DL — SIGNIFICANT CHANGE UP (ref 32–36)
MCHC RBC-ENTMCNC: 33.3 GM/DL — SIGNIFICANT CHANGE UP (ref 32–36)
MCV RBC AUTO: 84.7 FL — SIGNIFICANT CHANGE UP (ref 80–100)
MCV RBC AUTO: 88.5 FL — SIGNIFICANT CHANGE UP (ref 80–100)
MCV RBC AUTO: 89.4 FL — SIGNIFICANT CHANGE UP (ref 80–100)
MCV RBC AUTO: 90.1 FL — SIGNIFICANT CHANGE UP (ref 80–100)
MCV RBC AUTO: 93 FL — SIGNIFICANT CHANGE UP (ref 80–100)
MCV RBC AUTO: 93.6 FL — SIGNIFICANT CHANGE UP (ref 80–100)
MCV RBC AUTO: 94 FL — SIGNIFICANT CHANGE UP (ref 80–100)
MCV RBC AUTO: 97.4 FL — SIGNIFICANT CHANGE UP (ref 80–100)
NITRITE UR-MCNC: NEGATIVE — SIGNIFICANT CHANGE UP
NRBC # BLD: 0 /100 WBCS — SIGNIFICANT CHANGE UP
NRBC # FLD: 0 K/UL — SIGNIFICANT CHANGE UP
NRBC # FLD: 0.02 K/UL — HIGH
NRBC # FLD: 0.02 K/UL — HIGH
NRBC # FLD: 0.03 K/UL — HIGH
NRBC # FLD: 0.04 K/UL — HIGH
NRBC # FLD: 0.06 K/UL — HIGH
PH UR: 5 — SIGNIFICANT CHANGE UP (ref 5–8)
PHOSPHATE SERPL-MCNC: 1.9 MG/DL — LOW (ref 2.5–4.5)
PHOSPHATE SERPL-MCNC: 2.6 MG/DL — SIGNIFICANT CHANGE UP (ref 2.5–4.5)
PHOSPHATE SERPL-MCNC: 3.2 MG/DL — SIGNIFICANT CHANGE UP (ref 2.5–4.5)
PHOSPHATE SERPL-MCNC: 3.6 MG/DL — SIGNIFICANT CHANGE UP (ref 2.5–4.5)
PHOSPHATE SERPL-MCNC: 3.8 MG/DL — SIGNIFICANT CHANGE UP (ref 2.5–4.5)
PHOSPHATE SERPL-MCNC: 4.2 MG/DL — SIGNIFICANT CHANGE UP (ref 2.5–4.5)
PHOSPHATE SERPL-MCNC: 5 MG/DL — HIGH (ref 2.5–4.5)
PLATELET # BLD AUTO: 118 K/UL — LOW (ref 150–400)
PLATELET # BLD AUTO: 123 K/UL — LOW (ref 150–400)
PLATELET # BLD AUTO: 147 K/UL — LOW (ref 150–400)
PLATELET # BLD AUTO: 152 K/UL — SIGNIFICANT CHANGE UP (ref 150–400)
PLATELET # BLD AUTO: 175 K/UL — SIGNIFICANT CHANGE UP (ref 150–400)
PLATELET # BLD AUTO: 221 K/UL — SIGNIFICANT CHANGE UP (ref 150–400)
PLATELET # BLD AUTO: 230 K/UL — SIGNIFICANT CHANGE UP (ref 150–400)
PLATELET # BLD AUTO: 241 K/UL — SIGNIFICANT CHANGE UP (ref 150–400)
PLATELET # BLD AUTO: 242 K/UL — SIGNIFICANT CHANGE UP (ref 150–400)
PLATELET # BLD AUTO: 250 K/UL — SIGNIFICANT CHANGE UP (ref 150–400)
POTASSIUM SERPL-MCNC: 3.4 MMOL/L — LOW (ref 3.5–5.3)
POTASSIUM SERPL-MCNC: 4 MMOL/L — SIGNIFICANT CHANGE UP (ref 3.5–5.3)
POTASSIUM SERPL-MCNC: 4.1 MMOL/L — SIGNIFICANT CHANGE UP (ref 3.5–5.3)
POTASSIUM SERPL-MCNC: 4.2 MMOL/L — SIGNIFICANT CHANGE UP (ref 3.5–5.3)
POTASSIUM SERPL-MCNC: 4.3 MMOL/L — SIGNIFICANT CHANGE UP (ref 3.5–5.3)
POTASSIUM SERPL-MCNC: 4.4 MMOL/L — SIGNIFICANT CHANGE UP (ref 3.5–5.3)
POTASSIUM SERPL-MCNC: 4.6 MMOL/L — SIGNIFICANT CHANGE UP (ref 3.5–5.3)
POTASSIUM SERPL-MCNC: 5.1 MMOL/L — SIGNIFICANT CHANGE UP (ref 3.5–5.3)
POTASSIUM SERPL-SCNC: 3.4 MMOL/L — LOW (ref 3.5–5.3)
POTASSIUM SERPL-SCNC: 4 MMOL/L — SIGNIFICANT CHANGE UP (ref 3.5–5.3)
POTASSIUM SERPL-SCNC: 4.1 MMOL/L — SIGNIFICANT CHANGE UP (ref 3.5–5.3)
POTASSIUM SERPL-SCNC: 4.2 MMOL/L — SIGNIFICANT CHANGE UP (ref 3.5–5.3)
POTASSIUM SERPL-SCNC: 4.3 MMOL/L — SIGNIFICANT CHANGE UP (ref 3.5–5.3)
POTASSIUM SERPL-SCNC: 4.4 MMOL/L — SIGNIFICANT CHANGE UP (ref 3.5–5.3)
POTASSIUM SERPL-SCNC: 4.6 MMOL/L — SIGNIFICANT CHANGE UP (ref 3.5–5.3)
POTASSIUM SERPL-SCNC: 5.1 MMOL/L — SIGNIFICANT CHANGE UP (ref 3.5–5.3)
PROT SERPL-MCNC: 4.9 G/DL — LOW (ref 6–8.3)
PROT SERPL-MCNC: 5.2 G/DL — LOW (ref 6–8.3)
PROT SERPL-MCNC: 5.3 G/DL — LOW (ref 6–8.3)
PROT SERPL-MCNC: 5.4 G/DL — LOW (ref 6–8.3)
PROT SERPL-MCNC: 5.5 G/DL — LOW (ref 6–8.3)
PROT SERPL-MCNC: 5.7 G/DL — LOW (ref 6–8.3)
PROT SERPL-MCNC: 5.8 G/DL — LOW (ref 6–8.3)
PROT UR-MCNC: ABNORMAL
PROTHROM AB SERPL-ACNC: 10.8 SEC — SIGNIFICANT CHANGE UP (ref 10.6–13.6)
PROTHROM AB SERPL-ACNC: 11.2 SEC — SIGNIFICANT CHANGE UP (ref 10.6–13.6)
PROTHROM AB SERPL-ACNC: 11.5 SEC — SIGNIFICANT CHANGE UP (ref 10.6–13.6)
RBC # BLD: 2.29 M/UL — LOW (ref 3.8–5.2)
RBC # BLD: 2.34 M/UL — LOW (ref 3.8–5.2)
RBC # BLD: 2.51 M/UL — LOW (ref 3.8–5.2)
RBC # BLD: 2.58 M/UL — LOW (ref 3.8–5.2)
RBC # BLD: 2.65 M/UL — LOW (ref 3.8–5.2)
RBC # BLD: 2.73 M/UL — LOW (ref 3.8–5.2)
RBC # BLD: 2.74 M/UL — LOW (ref 3.8–5.2)
RBC # BLD: 2.83 M/UL — LOW (ref 3.8–5.2)
RBC # BLD: 2.94 M/UL — LOW (ref 3.8–5.2)
RBC # BLD: 3.12 M/UL — LOW (ref 3.8–5.2)
RBC # FLD: 13.3 % — SIGNIFICANT CHANGE UP (ref 10.3–14.5)
RBC # FLD: 13.4 % — SIGNIFICANT CHANGE UP (ref 10.3–14.5)
RBC # FLD: 13.8 % — SIGNIFICANT CHANGE UP (ref 10.3–14.5)
RBC # FLD: 14.1 % — SIGNIFICANT CHANGE UP (ref 10.3–14.5)
RBC # FLD: 14.2 % — SIGNIFICANT CHANGE UP (ref 10.3–14.5)
RBC # FLD: 14.3 % — SIGNIFICANT CHANGE UP (ref 10.3–14.5)
RBC # FLD: 14.6 % — HIGH (ref 10.3–14.5)
RBC # FLD: 14.9 % — HIGH (ref 10.3–14.5)
RH IG SCN BLD-IMP: POSITIVE — SIGNIFICANT CHANGE UP
SODIUM SERPL-SCNC: 144 MMOL/L — SIGNIFICANT CHANGE UP (ref 135–145)
SODIUM SERPL-SCNC: 145 MMOL/L — SIGNIFICANT CHANGE UP (ref 135–145)
SODIUM SERPL-SCNC: 146 MMOL/L — HIGH (ref 135–145)
SODIUM SERPL-SCNC: 148 MMOL/L — HIGH (ref 135–145)
SODIUM SERPL-SCNC: 149 MMOL/L — HIGH (ref 135–145)
SODIUM SERPL-SCNC: 150 MMOL/L — HIGH (ref 135–145)
SODIUM SERPL-SCNC: 150 MMOL/L — HIGH (ref 135–145)
SODIUM SERPL-SCNC: 152 MMOL/L — HIGH (ref 135–145)
SODIUM SERPL-SCNC: 153 MMOL/L — HIGH (ref 135–145)
SODIUM SERPL-SCNC: 156 MMOL/L — HIGH (ref 135–145)
SP GR SPEC: 1.01 — SIGNIFICANT CHANGE UP (ref 1–1.05)
SPECIMEN SOURCE: SIGNIFICANT CHANGE UP
UROBILINOGEN FLD QL: SIGNIFICANT CHANGE UP
WBC # BLD: 14.56 K/UL — HIGH (ref 3.8–10.5)
WBC # BLD: 19.27 K/UL — HIGH (ref 3.8–10.5)
WBC # BLD: 20.78 K/UL — HIGH (ref 3.8–10.5)
WBC # BLD: 20.9 K/UL — HIGH (ref 3.8–10.5)
WBC # BLD: 21.13 K/UL — HIGH (ref 3.8–10.5)
WBC # BLD: 21.38 K/UL — HIGH (ref 3.8–10.5)
WBC # BLD: 22.25 K/UL — HIGH (ref 3.8–10.5)
WBC # BLD: 23.96 K/UL — HIGH (ref 3.8–10.5)
WBC # BLD: 25.69 K/UL — HIGH (ref 3.8–10.5)
WBC # BLD: 28.77 K/UL — HIGH (ref 3.8–10.5)
WBC # FLD AUTO: 14.56 K/UL — HIGH (ref 3.8–10.5)
WBC # FLD AUTO: 19.27 K/UL — HIGH (ref 3.8–10.5)
WBC # FLD AUTO: 20.78 K/UL — HIGH (ref 3.8–10.5)
WBC # FLD AUTO: 20.9 K/UL — HIGH (ref 3.8–10.5)
WBC # FLD AUTO: 21.13 K/UL — HIGH (ref 3.8–10.5)
WBC # FLD AUTO: 21.38 K/UL — HIGH (ref 3.8–10.5)
WBC # FLD AUTO: 22.25 K/UL — HIGH (ref 3.8–10.5)
WBC # FLD AUTO: 23.96 K/UL — HIGH (ref 3.8–10.5)
WBC # FLD AUTO: 25.69 K/UL — HIGH (ref 3.8–10.5)
WBC # FLD AUTO: 28.77 K/UL — HIGH (ref 3.8–10.5)

## 2022-01-01 PROCEDURE — 76604 US EXAM CHEST: CPT | Mod: 26

## 2022-01-01 PROCEDURE — 71045 X-RAY EXAM CHEST 1 VIEW: CPT | Mod: 26

## 2022-01-01 PROCEDURE — 99291 CRITICAL CARE FIRST HOUR: CPT

## 2022-01-01 PROCEDURE — 99291 CRITICAL CARE FIRST HOUR: CPT | Mod: 25

## 2022-01-01 PROCEDURE — 93308 TTE F-UP OR LMTD: CPT | Mod: 26

## 2022-01-01 PROCEDURE — 76705 ECHO EXAM OF ABDOMEN: CPT | Mod: 26

## 2022-01-01 RX ORDER — FENTANYL CITRATE 50 UG/ML
100 INJECTION INTRAVENOUS ONCE
Refills: 0 | Status: DISCONTINUED | OUTPATIENT
Start: 2022-01-01 | End: 2022-01-01

## 2022-01-01 RX ORDER — FENTANYL CITRATE 50 UG/ML
50 INJECTION INTRAVENOUS ONCE
Refills: 0 | Status: DISCONTINUED | OUTPATIENT
Start: 2022-01-01 | End: 2022-01-01

## 2022-01-01 RX ORDER — POTASSIUM CHLORIDE 20 MEQ
40 PACKET (EA) ORAL ONCE
Refills: 0 | Status: COMPLETED | OUTPATIENT
Start: 2022-01-01 | End: 2022-01-01

## 2022-01-01 RX ORDER — HUMAN INSULIN 100 [IU]/ML
20 INJECTION, SUSPENSION SUBCUTANEOUS EVERY 6 HOURS
Refills: 0 | Status: DISCONTINUED | OUTPATIENT
Start: 2022-01-01 | End: 2022-01-01

## 2022-01-01 RX ORDER — METRONIDAZOLE 500 MG
500 TABLET ORAL ONCE
Refills: 0 | Status: COMPLETED | OUTPATIENT
Start: 2022-01-01 | End: 2022-01-01

## 2022-01-01 RX ORDER — METRONIDAZOLE 500 MG
500 TABLET ORAL EVERY 8 HOURS
Refills: 0 | Status: DISCONTINUED | OUTPATIENT
Start: 2022-01-01 | End: 2022-01-01

## 2022-01-01 RX ORDER — MEROPENEM 1 G/30ML
1000 INJECTION INTRAVENOUS EVERY 12 HOURS
Refills: 0 | Status: DISCONTINUED | OUTPATIENT
Start: 2022-01-01 | End: 2022-01-01

## 2022-01-01 RX ORDER — MIDAZOLAM HYDROCHLORIDE 1 MG/ML
0.02 INJECTION, SOLUTION INTRAMUSCULAR; INTRAVENOUS
Qty: 100 | Refills: 0 | Status: DISCONTINUED | OUTPATIENT
Start: 2022-01-01 | End: 2022-01-01

## 2022-01-01 RX ORDER — DEXMEDETOMIDINE HYDROCHLORIDE IN 0.9% SODIUM CHLORIDE 4 UG/ML
0.1 INJECTION INTRAVENOUS
Qty: 400 | Refills: 0 | Status: DISCONTINUED | OUTPATIENT
Start: 2022-01-01 | End: 2022-01-01

## 2022-01-01 RX ORDER — MIDAZOLAM HYDROCHLORIDE 1 MG/ML
4 INJECTION, SOLUTION INTRAMUSCULAR; INTRAVENOUS ONCE
Refills: 0 | Status: DISCONTINUED | OUTPATIENT
Start: 2022-01-01 | End: 2022-01-01

## 2022-01-01 RX ORDER — HUMAN INSULIN 100 [IU]/ML
15 INJECTION, SUSPENSION SUBCUTANEOUS EVERY 8 HOURS
Refills: 0 | Status: DISCONTINUED | OUTPATIENT
Start: 2022-01-01 | End: 2022-01-01

## 2022-01-01 RX ORDER — POTASSIUM CHLORIDE 20 MEQ
40 PACKET (EA) ORAL ONCE
Refills: 0 | Status: DISCONTINUED | OUTPATIENT
Start: 2022-01-01 | End: 2022-01-01

## 2022-01-01 RX ORDER — ALBUTEROL 90 UG/1
2 AEROSOL, METERED ORAL EVERY 12 HOURS
Refills: 0 | Status: DISCONTINUED | OUTPATIENT
Start: 2022-01-01 | End: 2022-01-01

## 2022-01-01 RX ORDER — DEXTROSE 50 % IN WATER 50 %
12.5 SYRINGE (ML) INTRAVENOUS ONCE
Refills: 0 | Status: COMPLETED | OUTPATIENT
Start: 2022-01-01 | End: 2022-01-01

## 2022-01-01 RX ORDER — HUMAN INSULIN 100 [IU]/ML
33 INJECTION, SUSPENSION SUBCUTANEOUS EVERY 6 HOURS
Refills: 0 | Status: DISCONTINUED | OUTPATIENT
Start: 2022-01-01 | End: 2022-01-01

## 2022-01-01 RX ORDER — SODIUM CHLORIDE 9 MG/ML
1000 INJECTION, SOLUTION INTRAVENOUS
Refills: 0 | Status: DISCONTINUED | OUTPATIENT
Start: 2022-01-01 | End: 2022-01-01

## 2022-01-01 RX ORDER — HUMAN INSULIN 100 [IU]/ML
15 INJECTION, SUSPENSION SUBCUTANEOUS EVERY 6 HOURS
Refills: 0 | Status: DISCONTINUED | OUTPATIENT
Start: 2022-01-01 | End: 2022-01-01

## 2022-01-01 RX ORDER — ALBUMIN HUMAN 25 %
250 VIAL (ML) INTRAVENOUS ONCE
Refills: 0 | Status: COMPLETED | OUTPATIENT
Start: 2022-01-01 | End: 2022-01-01

## 2022-01-01 RX ORDER — ACETAMINOPHEN 500 MG
625 TABLET ORAL EVERY 8 HOURS
Refills: 0 | Status: DISCONTINUED | OUTPATIENT
Start: 2022-01-01 | End: 2022-01-01

## 2022-01-01 RX ORDER — MIDAZOLAM HYDROCHLORIDE 1 MG/ML
2 INJECTION, SOLUTION INTRAMUSCULAR; INTRAVENOUS ONCE
Refills: 0 | Status: DISCONTINUED | OUTPATIENT
Start: 2022-01-01 | End: 2022-01-01

## 2022-01-01 RX ORDER — METRONIDAZOLE 500 MG
TABLET ORAL
Refills: 0 | Status: DISCONTINUED | OUTPATIENT
Start: 2022-01-01 | End: 2022-01-01

## 2022-01-01 RX ORDER — FENTANYL CITRATE 50 UG/ML
0.5 INJECTION INTRAVENOUS
Qty: 2500 | Refills: 0 | Status: DISCONTINUED | OUTPATIENT
Start: 2022-01-01 | End: 2022-01-01

## 2022-01-01 RX ORDER — POTASSIUM PHOSPHATE, MONOBASIC POTASSIUM PHOSPHATE, DIBASIC 236; 224 MG/ML; MG/ML
30 INJECTION, SOLUTION INTRAVENOUS ONCE
Refills: 0 | Status: COMPLETED | OUTPATIENT
Start: 2022-01-01 | End: 2022-01-01

## 2022-01-01 RX ORDER — HUMAN INSULIN 100 [IU]/ML
25 INJECTION, SUSPENSION SUBCUTANEOUS EVERY 6 HOURS
Refills: 0 | Status: DISCONTINUED | OUTPATIENT
Start: 2022-01-01 | End: 2022-01-01

## 2022-01-01 RX ORDER — MIDODRINE HYDROCHLORIDE 2.5 MG/1
10 TABLET ORAL EVERY 8 HOURS
Refills: 0 | Status: DISCONTINUED | OUTPATIENT
Start: 2022-01-01 | End: 2022-01-01

## 2022-01-01 RX ORDER — DEXTROSE 50 % IN WATER 50 %
25 SYRINGE (ML) INTRAVENOUS ONCE
Refills: 0 | Status: COMPLETED | OUTPATIENT
Start: 2022-01-01 | End: 2022-01-01

## 2022-01-01 RX ORDER — VANCOMYCIN HCL 1 G
125 VIAL (EA) INTRAVENOUS EVERY 6 HOURS
Refills: 0 | Status: DISCONTINUED | OUTPATIENT
Start: 2022-01-01 | End: 2022-01-01

## 2022-01-01 RX ADMIN — Medication 2: at 12:26

## 2022-01-01 RX ADMIN — DEXMEDETOMIDINE HYDROCHLORIDE IN 0.9% SODIUM CHLORIDE 1.05 MICROGRAM(S)/KG/HR: 4 INJECTION INTRAVENOUS at 09:48

## 2022-01-01 RX ADMIN — MIDODRINE HYDROCHLORIDE 10 MILLIGRAM(S): 2.5 TABLET ORAL at 05:37

## 2022-01-01 RX ADMIN — Medication 2 MILLIGRAM(S): at 23:15

## 2022-01-01 RX ADMIN — DEXMEDETOMIDINE HYDROCHLORIDE IN 0.9% SODIUM CHLORIDE 1.05 MICROGRAM(S)/KG/HR: 4 INJECTION INTRAVENOUS at 23:15

## 2022-01-01 RX ADMIN — PROPOFOL 7.52 MICROGRAM(S)/KG/MIN: 10 INJECTION, EMULSION INTRAVENOUS at 07:40

## 2022-01-01 RX ADMIN — Medication 1 APPLICATION(S): at 17:24

## 2022-01-01 RX ADMIN — PIPERACILLIN AND TAZOBACTAM 25 GRAM(S): 4; .5 INJECTION, POWDER, LYOPHILIZED, FOR SOLUTION INTRAVENOUS at 02:20

## 2022-01-01 RX ADMIN — LIDOCAINE 1 PATCH: 4 CREAM TOPICAL at 23:39

## 2022-01-01 RX ADMIN — Medication 625 MILLIGRAM(S): at 12:10

## 2022-01-01 RX ADMIN — LIDOCAINE 1 PATCH: 4 CREAM TOPICAL at 12:30

## 2022-01-01 RX ADMIN — Medication 500 MILLILITER(S): at 08:13

## 2022-01-01 RX ADMIN — PROPOFOL 7.52 MICROGRAM(S)/KG/MIN: 10 INJECTION, EMULSION INTRAVENOUS at 23:16

## 2022-01-01 RX ADMIN — Medication 650 MILLIGRAM(S): at 08:37

## 2022-01-01 RX ADMIN — Medication 2: at 17:33

## 2022-01-01 RX ADMIN — PROPOFOL 7.52 MICROGRAM(S)/KG/MIN: 10 INJECTION, EMULSION INTRAVENOUS at 19:50

## 2022-01-01 RX ADMIN — CHLORHEXIDINE GLUCONATE 15 MILLILITER(S): 213 SOLUTION TOPICAL at 05:38

## 2022-01-01 RX ADMIN — FENTANYL CITRATE 50 MICROGRAM(S): 50 INJECTION INTRAVENOUS at 23:15

## 2022-01-01 RX ADMIN — Medication 1 TABLET(S): at 12:43

## 2022-01-01 RX ADMIN — Medication 100 MILLIGRAM(S): at 22:02

## 2022-01-01 RX ADMIN — Medication 3.92 MICROGRAM(S)/KG/MIN: at 22:00

## 2022-01-01 RX ADMIN — Medication 1 TABLET(S): at 12:30

## 2022-01-01 RX ADMIN — LIDOCAINE 1 PATCH: 4 CREAM TOPICAL at 11:04

## 2022-01-01 RX ADMIN — ENOXAPARIN SODIUM 40 MILLIGRAM(S): 100 INJECTION SUBCUTANEOUS at 18:12

## 2022-01-01 RX ADMIN — Medication 2: at 11:03

## 2022-01-01 RX ADMIN — LIDOCAINE 1 PATCH: 4 CREAM TOPICAL at 12:29

## 2022-01-01 RX ADMIN — CHLORHEXIDINE GLUCONATE 15 MILLILITER(S): 213 SOLUTION TOPICAL at 05:03

## 2022-01-01 RX ADMIN — PROPOFOL 7.52 MICROGRAM(S)/KG/MIN: 10 INJECTION, EMULSION INTRAVENOUS at 08:38

## 2022-01-01 RX ADMIN — Medication 1 APPLICATION(S): at 18:02

## 2022-01-01 RX ADMIN — Medication 1 APPLICATION(S): at 17:29

## 2022-01-01 RX ADMIN — Medication 3.92 MICROGRAM(S)/KG/MIN: at 02:45

## 2022-01-01 RX ADMIN — Medication 0: at 23:23

## 2022-01-01 RX ADMIN — HUMAN INSULIN 30 UNIT(S): 100 INJECTION, SUSPENSION SUBCUTANEOUS at 05:09

## 2022-01-01 RX ADMIN — CHLORHEXIDINE GLUCONATE 15 MILLILITER(S): 213 SOLUTION TOPICAL at 17:12

## 2022-01-01 RX ADMIN — MIDAZOLAM HYDROCHLORIDE 0.84 MG/KG/HR: 1 INJECTION, SOLUTION INTRAMUSCULAR; INTRAVENOUS at 09:39

## 2022-01-01 RX ADMIN — PROPOFOL 7.52 MICROGRAM(S)/KG/MIN: 10 INJECTION, EMULSION INTRAVENOUS at 09:42

## 2022-01-01 RX ADMIN — MIDAZOLAM HYDROCHLORIDE 0.84 MG/KG/HR: 1 INJECTION, SOLUTION INTRAMUSCULAR; INTRAVENOUS at 19:52

## 2022-01-01 RX ADMIN — MIDODRINE HYDROCHLORIDE 10 MILLIGRAM(S): 2.5 TABLET ORAL at 05:23

## 2022-01-01 RX ADMIN — DEXMEDETOMIDINE HYDROCHLORIDE IN 0.9% SODIUM CHLORIDE 1.05 MICROGRAM(S)/KG/HR: 4 INJECTION INTRAVENOUS at 19:50

## 2022-01-01 RX ADMIN — DEXMEDETOMIDINE HYDROCHLORIDE IN 0.9% SODIUM CHLORIDE 1.05 MICROGRAM(S)/KG/HR: 4 INJECTION INTRAVENOUS at 08:38

## 2022-01-01 RX ADMIN — MIDAZOLAM HYDROCHLORIDE 0.84 MG/KG/HR: 1 INJECTION, SOLUTION INTRAMUSCULAR; INTRAVENOUS at 19:50

## 2022-01-01 RX ADMIN — Medication 125 MILLIGRAM(S): at 02:07

## 2022-01-01 RX ADMIN — Medication 3.92 MICROGRAM(S)/KG/MIN: at 09:47

## 2022-01-01 RX ADMIN — CHLORHEXIDINE GLUCONATE 1 APPLICATION(S): 213 SOLUTION TOPICAL at 06:34

## 2022-01-01 RX ADMIN — MEROPENEM 100 MILLIGRAM(S): 1 INJECTION INTRAVENOUS at 05:17

## 2022-01-01 RX ADMIN — LIDOCAINE 1 PATCH: 4 CREAM TOPICAL at 18:28

## 2022-01-01 RX ADMIN — FENTANYL CITRATE 100 MICROGRAM(S): 50 INJECTION INTRAVENOUS at 23:00

## 2022-01-01 RX ADMIN — PIPERACILLIN AND TAZOBACTAM 25 GRAM(S): 4; .5 INJECTION, POWDER, LYOPHILIZED, FOR SOLUTION INTRAVENOUS at 01:54

## 2022-01-01 RX ADMIN — DEXMEDETOMIDINE HYDROCHLORIDE IN 0.9% SODIUM CHLORIDE 1.05 MICROGRAM(S)/KG/HR: 4 INJECTION INTRAVENOUS at 19:36

## 2022-01-01 RX ADMIN — Medication 2: at 17:42

## 2022-01-01 RX ADMIN — MIDODRINE HYDROCHLORIDE 10 MILLIGRAM(S): 2.5 TABLET ORAL at 21:51

## 2022-01-01 RX ADMIN — Medication 25 GRAM(S): at 05:09

## 2022-01-01 RX ADMIN — Medication 1 TABLET(S): at 11:05

## 2022-01-01 RX ADMIN — ENOXAPARIN SODIUM 40 MILLIGRAM(S): 100 INJECTION SUBCUTANEOUS at 17:24

## 2022-01-01 RX ADMIN — Medication 3.92 MICROGRAM(S)/KG/MIN: at 23:15

## 2022-01-01 RX ADMIN — Medication 3.92 MICROGRAM(S)/KG/MIN: at 08:08

## 2022-01-01 RX ADMIN — Medication 3.92 MICROGRAM(S)/KG/MIN: at 09:40

## 2022-01-01 RX ADMIN — MEROPENEM 100 MILLIGRAM(S): 1 INJECTION INTRAVENOUS at 05:03

## 2022-01-01 RX ADMIN — PIPERACILLIN AND TAZOBACTAM 25 GRAM(S): 4; .5 INJECTION, POWDER, LYOPHILIZED, FOR SOLUTION INTRAVENOUS at 17:24

## 2022-01-01 RX ADMIN — Medication 6: at 00:52

## 2022-01-01 RX ADMIN — FENTANYL CITRATE 100 MICROGRAM(S): 50 INJECTION INTRAVENOUS at 08:00

## 2022-01-01 RX ADMIN — SODIUM CHLORIDE 50 MILLILITER(S): 9 INJECTION, SOLUTION INTRAVENOUS at 21:14

## 2022-01-01 RX ADMIN — Medication 625 MILLIGRAM(S): at 12:05

## 2022-01-01 RX ADMIN — LIDOCAINE 1 PATCH: 4 CREAM TOPICAL at 19:46

## 2022-01-01 RX ADMIN — DEXMEDETOMIDINE HYDROCHLORIDE IN 0.9% SODIUM CHLORIDE 1.05 MICROGRAM(S)/KG/HR: 4 INJECTION INTRAVENOUS at 18:11

## 2022-01-01 RX ADMIN — CHLORHEXIDINE GLUCONATE 15 MILLILITER(S): 213 SOLUTION TOPICAL at 17:28

## 2022-01-01 RX ADMIN — Medication 4: at 05:22

## 2022-01-01 RX ADMIN — Medication 3.92 MICROGRAM(S)/KG/MIN: at 19:53

## 2022-01-01 RX ADMIN — Medication 1 APPLICATION(S): at 05:02

## 2022-01-01 RX ADMIN — Medication 4: at 18:19

## 2022-01-01 RX ADMIN — Medication 3.92 MICROGRAM(S)/KG/MIN: at 18:16

## 2022-01-01 RX ADMIN — HUMAN INSULIN 33 UNIT(S): 100 INJECTION, SUSPENSION SUBCUTANEOUS at 18:19

## 2022-01-01 RX ADMIN — PROPOFOL 7.52 MICROGRAM(S)/KG/MIN: 10 INJECTION, EMULSION INTRAVENOUS at 09:39

## 2022-01-01 RX ADMIN — Medication 650 MILLIGRAM(S): at 09:49

## 2022-01-01 RX ADMIN — CHLORHEXIDINE GLUCONATE 1 APPLICATION(S): 213 SOLUTION TOPICAL at 05:40

## 2022-01-01 RX ADMIN — LIDOCAINE 1 PATCH: 4 CREAM TOPICAL at 19:50

## 2022-01-01 RX ADMIN — Medication 3.92 MICROGRAM(S)/KG/MIN: at 19:35

## 2022-01-01 RX ADMIN — CHLORHEXIDINE GLUCONATE 1 APPLICATION(S): 213 SOLUTION TOPICAL at 06:26

## 2022-01-01 RX ADMIN — Medication 3.92 MICROGRAM(S)/KG/MIN: at 19:50

## 2022-01-01 RX ADMIN — PROPOFOL 7.52 MICROGRAM(S)/KG/MIN: 10 INJECTION, EMULSION INTRAVENOUS at 08:08

## 2022-01-01 RX ADMIN — Medication 2: at 05:38

## 2022-01-01 RX ADMIN — Medication 6 MILLIGRAM(S): at 05:11

## 2022-01-01 RX ADMIN — Medication 1 APPLICATION(S): at 05:20

## 2022-01-01 RX ADMIN — DEXMEDETOMIDINE HYDROCHLORIDE IN 0.9% SODIUM CHLORIDE 1.05 MICROGRAM(S)/KG/HR: 4 INJECTION INTRAVENOUS at 22:38

## 2022-01-01 RX ADMIN — PROPOFOL 7.52 MICROGRAM(S)/KG/MIN: 10 INJECTION, EMULSION INTRAVENOUS at 19:53

## 2022-01-01 RX ADMIN — PIPERACILLIN AND TAZOBACTAM 25 GRAM(S): 4; .5 INJECTION, POWDER, LYOPHILIZED, FOR SOLUTION INTRAVENOUS at 09:19

## 2022-01-01 RX ADMIN — PIPERACILLIN AND TAZOBACTAM 25 GRAM(S): 4; .5 INJECTION, POWDER, LYOPHILIZED, FOR SOLUTION INTRAVENOUS at 01:22

## 2022-01-01 RX ADMIN — Medication 3.92 MICROGRAM(S)/KG/MIN: at 07:41

## 2022-01-01 RX ADMIN — Medication 125 MILLIGRAM(S): at 16:54

## 2022-01-01 RX ADMIN — Medication 1 APPLICATION(S): at 18:20

## 2022-01-01 RX ADMIN — LIDOCAINE 1 PATCH: 4 CREAM TOPICAL at 19:16

## 2022-01-01 RX ADMIN — MEROPENEM 100 MILLIGRAM(S): 1 INJECTION INTRAVENOUS at 05:37

## 2022-01-01 RX ADMIN — Medication 1 APPLICATION(S): at 17:25

## 2022-01-01 RX ADMIN — Medication 4: at 11:16

## 2022-01-01 RX ADMIN — POLYETHYLENE GLYCOL 3350 17 GRAM(S): 17 POWDER, FOR SOLUTION ORAL at 12:28

## 2022-01-01 RX ADMIN — PIPERACILLIN AND TAZOBACTAM 25 GRAM(S): 4; .5 INJECTION, POWDER, LYOPHILIZED, FOR SOLUTION INTRAVENOUS at 18:18

## 2022-01-01 RX ADMIN — CHLORHEXIDINE GLUCONATE 15 MILLILITER(S): 213 SOLUTION TOPICAL at 18:12

## 2022-01-01 RX ADMIN — SENNA PLUS 10 MILLILITER(S): 8.6 TABLET ORAL at 11:48

## 2022-01-01 RX ADMIN — SENNA PLUS 10 MILLILITER(S): 8.6 TABLET ORAL at 14:00

## 2022-01-01 RX ADMIN — Medication 125 MILLIGRAM(S): at 08:09

## 2022-01-01 RX ADMIN — FENTANYL CITRATE 100 MICROGRAM(S): 50 INJECTION INTRAVENOUS at 22:33

## 2022-01-01 RX ADMIN — ALBUTEROL 2 PUFF(S): 90 AEROSOL, METERED ORAL at 21:58

## 2022-01-01 RX ADMIN — HUMAN INSULIN 15 UNIT(S): 100 INJECTION, SUSPENSION SUBCUTANEOUS at 23:42

## 2022-01-01 RX ADMIN — PROPOFOL 7.52 MICROGRAM(S)/KG/MIN: 10 INJECTION, EMULSION INTRAVENOUS at 22:37

## 2022-01-01 RX ADMIN — Medication 2: at 00:25

## 2022-01-01 RX ADMIN — LIDOCAINE 1 PATCH: 4 CREAM TOPICAL at 20:06

## 2022-01-01 RX ADMIN — MEROPENEM 100 MILLIGRAM(S): 1 INJECTION INTRAVENOUS at 05:25

## 2022-01-01 RX ADMIN — Medication 0: at 17:28

## 2022-01-01 RX ADMIN — FENTANYL CITRATE 2.09 MICROGRAM(S)/KG/HR: 50 INJECTION INTRAVENOUS at 09:39

## 2022-01-01 RX ADMIN — MIDODRINE HYDROCHLORIDE 10 MILLIGRAM(S): 2.5 TABLET ORAL at 22:46

## 2022-01-01 RX ADMIN — Medication 1 TABLET(S): at 11:47

## 2022-01-01 RX ADMIN — Medication 1 APPLICATION(S): at 17:11

## 2022-01-01 RX ADMIN — MEROPENEM 100 MILLIGRAM(S): 1 INJECTION INTRAVENOUS at 17:10

## 2022-01-01 RX ADMIN — Medication 3.92 MICROGRAM(S)/KG/MIN: at 09:58

## 2022-01-01 RX ADMIN — Medication 4: at 05:43

## 2022-01-01 RX ADMIN — LIDOCAINE 1 PATCH: 4 CREAM TOPICAL at 12:05

## 2022-01-01 RX ADMIN — ENOXAPARIN SODIUM 40 MILLIGRAM(S): 100 INJECTION SUBCUTANEOUS at 17:29

## 2022-01-01 RX ADMIN — CHLORHEXIDINE GLUCONATE 15 MILLILITER(S): 213 SOLUTION TOPICAL at 05:17

## 2022-01-01 RX ADMIN — Medication 125 MILLIGRAM(S): at 19:50

## 2022-01-01 RX ADMIN — LIDOCAINE 1 PATCH: 4 CREAM TOPICAL at 00:27

## 2022-01-01 RX ADMIN — ENOXAPARIN SODIUM 40 MILLIGRAM(S): 100 INJECTION SUBCUTANEOUS at 18:02

## 2022-01-01 RX ADMIN — DEXMEDETOMIDINE HYDROCHLORIDE IN 0.9% SODIUM CHLORIDE 1.05 MICROGRAM(S)/KG/HR: 4 INJECTION INTRAVENOUS at 09:58

## 2022-01-01 RX ADMIN — MIDAZOLAM HYDROCHLORIDE 0.84 MG/KG/HR: 1 INJECTION, SOLUTION INTRAMUSCULAR; INTRAVENOUS at 09:57

## 2022-01-01 RX ADMIN — Medication 6: at 18:04

## 2022-01-01 RX ADMIN — PIPERACILLIN AND TAZOBACTAM 25 GRAM(S): 4; .5 INJECTION, POWDER, LYOPHILIZED, FOR SOLUTION INTRAVENOUS at 10:15

## 2022-01-01 RX ADMIN — CHLORHEXIDINE GLUCONATE 15 MILLILITER(S): 213 SOLUTION TOPICAL at 05:21

## 2022-01-01 RX ADMIN — DEXMEDETOMIDINE HYDROCHLORIDE IN 0.9% SODIUM CHLORIDE 1.05 MICROGRAM(S)/KG/HR: 4 INJECTION INTRAVENOUS at 09:40

## 2022-01-01 RX ADMIN — MIDODRINE HYDROCHLORIDE 10 MILLIGRAM(S): 2.5 TABLET ORAL at 05:24

## 2022-01-01 RX ADMIN — MUPIROCIN 1 APPLICATION(S): 20 OINTMENT TOPICAL at 05:43

## 2022-01-01 RX ADMIN — LIDOCAINE 1 PATCH: 4 CREAM TOPICAL at 11:48

## 2022-01-01 RX ADMIN — CHLORHEXIDINE GLUCONATE 1 APPLICATION(S): 213 SOLUTION TOPICAL at 06:51

## 2022-01-01 RX ADMIN — Medication 40 MILLIEQUIVALENT(S): at 06:49

## 2022-01-01 RX ADMIN — MIDODRINE HYDROCHLORIDE 10 MILLIGRAM(S): 2.5 TABLET ORAL at 22:01

## 2022-01-01 RX ADMIN — Medication 625 MILLIGRAM(S): at 12:38

## 2022-01-01 RX ADMIN — MIDAZOLAM HYDROCHLORIDE 0.84 MG/KG/HR: 1 INJECTION, SOLUTION INTRAMUSCULAR; INTRAVENOUS at 19:35

## 2022-01-01 RX ADMIN — MIDAZOLAM HYDROCHLORIDE 0.84 MG/KG/HR: 1 INJECTION, SOLUTION INTRAMUSCULAR; INTRAVENOUS at 00:10

## 2022-01-01 RX ADMIN — POLYETHYLENE GLYCOL 3350 17 GRAM(S): 17 POWDER, FOR SOLUTION ORAL at 11:48

## 2022-01-01 RX ADMIN — CHLORHEXIDINE GLUCONATE 1 APPLICATION(S): 213 SOLUTION TOPICAL at 09:43

## 2022-01-01 RX ADMIN — SENNA PLUS 10 MILLILITER(S): 8.6 TABLET ORAL at 12:24

## 2022-01-01 RX ADMIN — Medication 650 MILLIGRAM(S): at 04:57

## 2022-01-01 RX ADMIN — DEXMEDETOMIDINE HYDROCHLORIDE IN 0.9% SODIUM CHLORIDE 1.05 MICROGRAM(S)/KG/HR: 4 INJECTION INTRAVENOUS at 19:51

## 2022-01-01 RX ADMIN — Medication 625 MILLIGRAM(S): at 23:49

## 2022-01-01 RX ADMIN — FENTANYL CITRATE 100 MICROGRAM(S): 50 INJECTION INTRAVENOUS at 09:30

## 2022-01-01 RX ADMIN — CHLORHEXIDINE GLUCONATE 15 MILLILITER(S): 213 SOLUTION TOPICAL at 17:23

## 2022-01-01 RX ADMIN — LIDOCAINE 1 PATCH: 4 CREAM TOPICAL at 12:59

## 2022-01-01 RX ADMIN — MIDAZOLAM HYDROCHLORIDE 0.84 MG/KG/HR: 1 INJECTION, SOLUTION INTRAMUSCULAR; INTRAVENOUS at 18:14

## 2022-01-01 RX ADMIN — CHLORHEXIDINE GLUCONATE 15 MILLILITER(S): 213 SOLUTION TOPICAL at 05:28

## 2022-01-01 RX ADMIN — DEXMEDETOMIDINE HYDROCHLORIDE IN 0.9% SODIUM CHLORIDE 1.05 MICROGRAM(S)/KG/HR: 4 INJECTION INTRAVENOUS at 07:41

## 2022-01-01 RX ADMIN — Medication 1 APPLICATION(S): at 05:24

## 2022-01-01 RX ADMIN — Medication 2: at 11:47

## 2022-01-01 RX ADMIN — HUMAN INSULIN 33 UNIT(S): 100 INJECTION, SUSPENSION SUBCUTANEOUS at 06:28

## 2022-01-01 RX ADMIN — HUMAN INSULIN 30 UNIT(S): 100 INJECTION, SUSPENSION SUBCUTANEOUS at 12:23

## 2022-01-01 RX ADMIN — MIDAZOLAM HYDROCHLORIDE 0.84 MG/KG/HR: 1 INJECTION, SOLUTION INTRAMUSCULAR; INTRAVENOUS at 22:37

## 2022-01-01 RX ADMIN — SENNA PLUS 10 MILLILITER(S): 8.6 TABLET ORAL at 11:51

## 2022-01-01 RX ADMIN — Medication 12.5 GRAM(S): at 17:44

## 2022-01-01 RX ADMIN — Medication 625 MILLIGRAM(S): at 12:48

## 2022-01-01 RX ADMIN — DEXMEDETOMIDINE HYDROCHLORIDE IN 0.9% SODIUM CHLORIDE 1.05 MICROGRAM(S)/KG/HR: 4 INJECTION INTRAVENOUS at 05:37

## 2022-01-01 RX ADMIN — MEROPENEM 100 MILLIGRAM(S): 1 INJECTION INTRAVENOUS at 17:24

## 2022-01-01 RX ADMIN — Medication 1 APPLICATION(S): at 18:12

## 2022-01-01 RX ADMIN — Medication 1 APPLICATION(S): at 05:18

## 2022-01-01 RX ADMIN — Medication 650 MILLIGRAM(S): at 09:19

## 2022-01-01 RX ADMIN — SENNA PLUS 10 MILLILITER(S): 8.6 TABLET ORAL at 11:47

## 2022-01-01 RX ADMIN — ENOXAPARIN SODIUM 40 MILLIGRAM(S): 100 INJECTION SUBCUTANEOUS at 18:15

## 2022-01-01 RX ADMIN — PROPOFOL 7.52 MICROGRAM(S)/KG/MIN: 10 INJECTION, EMULSION INTRAVENOUS at 22:00

## 2022-01-01 RX ADMIN — Medication 6 MILLIGRAM(S): at 05:12

## 2022-01-01 RX ADMIN — DEXMEDETOMIDINE HYDROCHLORIDE IN 0.9% SODIUM CHLORIDE 1.05 MICROGRAM(S)/KG/HR: 4 INJECTION INTRAVENOUS at 20:29

## 2022-01-01 RX ADMIN — Medication 1 TABLET(S): at 11:48

## 2022-01-01 RX ADMIN — LIDOCAINE 1 PATCH: 4 CREAM TOPICAL at 23:00

## 2022-01-01 RX ADMIN — Medication 100 MILLIGRAM(S): at 11:05

## 2022-01-01 RX ADMIN — Medication 1 APPLICATION(S): at 05:11

## 2022-01-01 RX ADMIN — MIDAZOLAM HYDROCHLORIDE 0.84 MG/KG/HR: 1 INJECTION, SOLUTION INTRAMUSCULAR; INTRAVENOUS at 09:47

## 2022-01-01 RX ADMIN — LIDOCAINE 1 PATCH: 4 CREAM TOPICAL at 11:37

## 2022-01-01 RX ADMIN — Medication 125 MILLIGRAM(S): at 13:06

## 2022-01-01 RX ADMIN — PROPOFOL 7.52 MICROGRAM(S)/KG/MIN: 10 INJECTION, EMULSION INTRAVENOUS at 09:48

## 2022-01-01 RX ADMIN — Medication 2: at 23:49

## 2022-01-01 RX ADMIN — Medication 2: at 18:13

## 2022-01-01 RX ADMIN — LIDOCAINE 1 PATCH: 4 CREAM TOPICAL at 19:38

## 2022-01-01 RX ADMIN — Medication 1 TABLET(S): at 11:15

## 2022-01-01 RX ADMIN — Medication 650 MILLIGRAM(S): at 01:08

## 2022-01-01 RX ADMIN — Medication 4: at 12:24

## 2022-01-01 RX ADMIN — Medication 1 APPLICATION(S): at 05:10

## 2022-01-01 RX ADMIN — MIDAZOLAM HYDROCHLORIDE 4 MILLIGRAM(S): 1 INJECTION, SOLUTION INTRAMUSCULAR; INTRAVENOUS at 21:30

## 2022-01-01 RX ADMIN — CHLORHEXIDINE GLUCONATE 15 MILLILITER(S): 213 SOLUTION TOPICAL at 18:03

## 2022-01-01 RX ADMIN — MIDODRINE HYDROCHLORIDE 10 MILLIGRAM(S): 2.5 TABLET ORAL at 13:06

## 2022-01-01 RX ADMIN — Medication 6 MILLIGRAM(S): at 05:29

## 2022-01-01 RX ADMIN — CHLORHEXIDINE GLUCONATE 15 MILLILITER(S): 213 SOLUTION TOPICAL at 18:19

## 2022-01-01 RX ADMIN — ENOXAPARIN SODIUM 40 MILLIGRAM(S): 100 INJECTION SUBCUTANEOUS at 17:12

## 2022-01-01 RX ADMIN — MIDAZOLAM HYDROCHLORIDE 0.84 MG/KG/HR: 1 INJECTION, SOLUTION INTRAMUSCULAR; INTRAVENOUS at 07:41

## 2022-01-01 RX ADMIN — Medication 2: at 12:44

## 2022-01-01 RX ADMIN — Medication 4: at 07:03

## 2022-01-01 RX ADMIN — DEXMEDETOMIDINE HYDROCHLORIDE IN 0.9% SODIUM CHLORIDE 1.05 MICROGRAM(S)/KG/HR: 4 INJECTION INTRAVENOUS at 08:09

## 2022-01-01 RX ADMIN — FENTANYL CITRATE 2.09 MICROGRAM(S)/KG/HR: 50 INJECTION INTRAVENOUS at 08:08

## 2022-01-01 RX ADMIN — MEROPENEM 100 MILLIGRAM(S): 1 INJECTION INTRAVENOUS at 19:22

## 2022-01-01 RX ADMIN — Medication 2: at 23:30

## 2022-01-01 RX ADMIN — DEXMEDETOMIDINE HYDROCHLORIDE IN 0.9% SODIUM CHLORIDE 1.05 MICROGRAM(S)/KG/HR: 4 INJECTION INTRAVENOUS at 19:53

## 2022-01-01 RX ADMIN — PROPOFOL 7.52 MICROGRAM(S)/KG/MIN: 10 INJECTION, EMULSION INTRAVENOUS at 09:58

## 2022-01-01 RX ADMIN — Medication 3.92 MICROGRAM(S)/KG/MIN: at 17:43

## 2022-01-01 RX ADMIN — Medication 1 APPLICATION(S): at 05:28

## 2022-01-01 RX ADMIN — HUMAN INSULIN 33 UNIT(S): 100 INJECTION, SUSPENSION SUBCUTANEOUS at 23:31

## 2022-01-01 RX ADMIN — LIDOCAINE 1 PATCH: 4 CREAM TOPICAL at 23:33

## 2022-01-01 RX ADMIN — Medication 650 MILLIGRAM(S): at 08:00

## 2022-01-01 RX ADMIN — PROPOFOL 7.52 MICROGRAM(S)/KG/MIN: 10 INJECTION, EMULSION INTRAVENOUS at 19:35

## 2022-01-01 RX ADMIN — CHLORHEXIDINE GLUCONATE 15 MILLILITER(S): 213 SOLUTION TOPICAL at 05:23

## 2022-01-01 RX ADMIN — CHLORHEXIDINE GLUCONATE 1 APPLICATION(S): 213 SOLUTION TOPICAL at 12:43

## 2022-01-01 RX ADMIN — FENTANYL CITRATE 50 MICROGRAM(S): 50 INJECTION INTRAVENOUS at 22:20

## 2022-01-01 RX ADMIN — Medication 1 TABLET(S): at 11:38

## 2022-01-01 RX ADMIN — MEROPENEM 100 MILLIGRAM(S): 1 INJECTION INTRAVENOUS at 17:29

## 2022-01-01 RX ADMIN — HUMAN INSULIN 20 UNIT(S): 100 INJECTION, SUSPENSION SUBCUTANEOUS at 18:03

## 2022-01-01 RX ADMIN — HUMAN INSULIN 20 UNIT(S): 100 INJECTION, SUSPENSION SUBCUTANEOUS at 11:49

## 2022-01-01 RX ADMIN — CHLORHEXIDINE GLUCONATE 1 APPLICATION(S): 213 SOLUTION TOPICAL at 06:49

## 2022-01-01 RX ADMIN — PROPOFOL 7.52 MICROGRAM(S)/KG/MIN: 10 INJECTION, EMULSION INTRAVENOUS at 17:43

## 2022-01-01 RX ADMIN — ENOXAPARIN SODIUM 40 MILLIGRAM(S): 100 INJECTION SUBCUTANEOUS at 17:30

## 2022-01-01 RX ADMIN — Medication 3.92 MICROGRAM(S)/KG/MIN: at 18:12

## 2022-01-01 RX ADMIN — ENOXAPARIN SODIUM 40 MILLIGRAM(S): 100 INJECTION SUBCUTANEOUS at 17:23

## 2022-01-01 RX ADMIN — Medication 1 APPLICATION(S): at 18:18

## 2022-01-01 RX ADMIN — FENTANYL CITRATE 2.09 MICROGRAM(S)/KG/HR: 50 INJECTION INTRAVENOUS at 19:51

## 2022-01-01 RX ADMIN — MEROPENEM 100 MILLIGRAM(S): 1 INJECTION INTRAVENOUS at 18:13

## 2022-01-01 RX ADMIN — MEROPENEM 100 MILLIGRAM(S): 1 INJECTION INTRAVENOUS at 05:18

## 2022-01-01 RX ADMIN — Medication 650 MILLIGRAM(S): at 22:36

## 2022-01-01 RX ADMIN — HUMAN INSULIN 30 UNIT(S): 100 INJECTION, SUSPENSION SUBCUTANEOUS at 00:33

## 2022-01-01 RX ADMIN — MIDODRINE HYDROCHLORIDE 10 MILLIGRAM(S): 2.5 TABLET ORAL at 11:15

## 2022-01-01 RX ADMIN — CHLORHEXIDINE GLUCONATE 15 MILLILITER(S): 213 SOLUTION TOPICAL at 05:09

## 2022-01-01 RX ADMIN — LIDOCAINE 1 PATCH: 4 CREAM TOPICAL at 19:20

## 2022-01-01 RX ADMIN — Medication 625 MILLIGRAM(S): at 08:00

## 2022-01-01 RX ADMIN — Medication 2: at 18:24

## 2022-01-01 RX ADMIN — PROPOFOL 7.52 MICROGRAM(S)/KG/MIN: 10 INJECTION, EMULSION INTRAVENOUS at 18:11

## 2022-01-01 RX ADMIN — MIDAZOLAM HYDROCHLORIDE 0.84 MG/KG/HR: 1 INJECTION, SOLUTION INTRAMUSCULAR; INTRAVENOUS at 08:08

## 2022-01-01 RX ADMIN — Medication 500 MILLILITER(S): at 06:53

## 2022-01-01 RX ADMIN — PIPERACILLIN AND TAZOBACTAM 25 GRAM(S): 4; .5 INJECTION, POWDER, LYOPHILIZED, FOR SOLUTION INTRAVENOUS at 09:42

## 2022-01-01 RX ADMIN — CHLORHEXIDINE GLUCONATE 1 APPLICATION(S): 213 SOLUTION TOPICAL at 05:03

## 2022-01-01 RX ADMIN — Medication 6: at 05:09

## 2022-01-01 RX ADMIN — PROPOFOL 7.52 MICROGRAM(S)/KG/MIN: 10 INJECTION, EMULSION INTRAVENOUS at 19:48

## 2022-01-01 RX ADMIN — LIDOCAINE 1 PATCH: 4 CREAM TOPICAL at 11:16

## 2022-01-01 RX ADMIN — POTASSIUM PHOSPHATE, MONOBASIC POTASSIUM PHOSPHATE, DIBASIC 83.33 MILLIMOLE(S): 236; 224 INJECTION, SOLUTION INTRAVENOUS at 06:49

## 2022-01-01 RX ADMIN — Medication 650 MILLIGRAM(S): at 10:00

## 2022-01-01 RX ADMIN — FENTANYL CITRATE 100 MICROGRAM(S): 50 INJECTION INTRAVENOUS at 08:15

## 2022-01-01 RX ADMIN — FENTANYL CITRATE 2.09 MICROGRAM(S)/KG/HR: 50 INJECTION INTRAVENOUS at 19:50

## 2022-01-01 RX ADMIN — POLYETHYLENE GLYCOL 3350 17 GRAM(S): 17 POWDER, FOR SOLUTION ORAL at 12:26

## 2022-01-01 RX ADMIN — MIDAZOLAM HYDROCHLORIDE 0.84 MG/KG/HR: 1 INJECTION, SOLUTION INTRAMUSCULAR; INTRAVENOUS at 18:15

## 2022-01-01 RX ADMIN — CHLORHEXIDINE GLUCONATE 15 MILLILITER(S): 213 SOLUTION TOPICAL at 17:42

## 2022-01-01 RX ADMIN — MIDODRINE HYDROCHLORIDE 10 MILLIGRAM(S): 2.5 TABLET ORAL at 14:00

## 2022-01-01 RX ADMIN — MEROPENEM 100 MILLIGRAM(S): 1 INJECTION INTRAVENOUS at 18:15

## 2022-01-01 RX ADMIN — POLYETHYLENE GLYCOL 3350 17 GRAM(S): 17 POWDER, FOR SOLUTION ORAL at 11:47

## 2022-01-01 RX ADMIN — MIDAZOLAM HYDROCHLORIDE 4 MILLIGRAM(S): 1 INJECTION, SOLUTION INTRAMUSCULAR; INTRAVENOUS at 08:00

## 2022-01-01 NOTE — PROGRESS NOTE ADULT - ATTENDING COMMENTS
61 F with polio, htn, hld, DM here with L hip dislocation and acute hypoxemic respiratory failure and acute hypercapnic respiratory failure due to COVID19 ARDS requiring intubation.    Keep sedated, no need for paralysis.  P/f ratio improving, will lower fio2 to 40% and peep to 8.    Will repeat abg, wean sedation as tolerated.  On empiric abx, f/u cultures  Continue with steroids  steroid induced hyperglycemia, increase NPH today if no improvement  distributive shock, continue with vasopressors    Critically ill patient requiring frequent bedside visits with therapy changes.

## 2022-01-01 NOTE — PROGRESS NOTE ADULT - ASSESSMENT
62 y/o F, PMH of Polio (bedbound at baseline w/ chronic contractures), HTN, HLD, Type 2 DM (unknown if on insulin), presented to ED w/ c/o left hip pain and dry cough x 1 week, found to be Covid+ and have L hip dislocation for which ortho was consulted for which it was determined she needed no acute surgical intervention. Course complicated by persistent hypoxia in the setting of multifocal pneumonia 2/2 COVID and/or aspiration.     Neuro:  Baseline nonverbal but responds to commands. Currently intubated and sedated.   -c/w on fent, versed, and prop  -will try to wean versed today    Respiratory:   #Acute hypoxic respiratory failure-   -P/F 47 pre-intubation. Severe ARDS.   - s/p intubation on 12/27  -COVID +, was being treated with dexamethasone and remdesivir. decadron to stop 1/5, remdesivir discontinued upon arrival to MICU  -Empirically tx for CAP abx. Previously on azithro and CTX but urine legionella negative and pt febrile so coverage broadened to zosyn on 12/30   -Previous CT negative for PE, previously on lovenox BID however after discussion with pharmacy normal ppx dose is appropriate      Cardiac:   -Currently on levophed (started 12/28), titrate to MAP 65  #HTN  #Hyperlipidemia- Continue statin      GI:  Tube feeds  bowel regimen as pt on fent gtt    /Renal:  #Hypernatremia- increased free water to 400 q4h and will check q12BMP  #T2DM- Humulin increased from 14 to 22 U on 12/31, will increase to 30U q6h today. If insulin requirements continue to rise, may consider gtt      MSK:  #Polio- Chronic contractures. No intervention at this time. Follow-up nutrition recommendations.   #Chronic Hip dislocations- Ortho following. No acute surgical intervention needed. Will ctm.     ID:  - urine legionella neg, Azithro dc'd on 12/29  -CTX broadened to zosyn on 12/30 as pt febrile  -blood cx NGTD, repeat cultures pending  -urine cultures growing GBS  -will order sputum culture    Prophylaxis:  #Lovenox ppx    GOC:   Appreciate palliative care, pt DNR. BRANDEE signed and in chart   60 y/o F, PMH of Polio (bedbound at baseline w/ chronic contractures), HTN, HLD, Type 2 DM (unknown if on insulin), presented to ED w/ c/o left hip pain and dry cough x 1 week, found to be Covid+ and have L hip dislocation for which ortho was consulted for which it was determined she needed no acute surgical intervention. Course complicated by persistent hypoxia in the setting of multifocal pneumonia 2/2 COVID and/or aspiration.     Neuro:  Baseline nonverbal but responds to commands. Currently intubated and sedated.   -c/w prop  -Weaned off versed 12/31 and fentanyl off this AM (1/1)    Respiratory:   #Acute hypoxic respiratory failure-   -P/F 47 pre-intubation. Severe ARDS.    - s/p intubation on 12/27  - FiO2 continuously downtrended since yesterday from 80% to 50% (1/1), decrease to 40% and PEEP 8 and follow up ABG (1/1)  -COVID +, was being treated with dexamethasone and remdesivir. decadron to stop 1/5, remdesivir discontinued upon arrival to MICU  -Empirically tx for CAP abx. Previously on azithro and CTX but urine legionella negative and pt febrile so coverage broadened to zosyn on 12/30.Continue Zosyn for 5 days, anticipate end on 1/3  -Previous CT negative for PE, previously on lovenox BID however after discussion with pharmacy normal ppx dose is appropriate      Cardiac:   -Currently on levophed (started 12/28), titrate to MAP 65  #HTN  #Hyperlipidemia- Continue statin      GI:  Tube feeds  bowel regimen as pt on fent gtt    /Renal:  #Hypernatremia- increased free water to 400 q4h and will check q12BMP  #T2DM- Humulin increased from 14 to 22 U on 12/31, increased to 30U q6h (12.31). Follow up SSI requirements, likely also increased in setting of steroids. No AG.    MSK:  #Polio- Chronic contractures. No intervention at this time. Follow-up nutrition recommendations.   #Chronic Hip dislocations- Ortho following. No acute surgical intervention needed. Will ctm.     ID:  - urine legionella neg, Azithro dc'd on 12/29  -CTX broadened to zosyn on 12/30 as pt febrile. Anticipate end date 1/3  -blood cx NGTD, repeat cultures pending  -urine cultures growing GBS  -Sputum culture 1/1 NGTD    Prophylaxis:  #Lovenox ppx    GOC:   Appreciate palliative care, pt DNR. BRANDEE signed and in chart

## 2022-01-01 NOTE — PROGRESS NOTE ADULT - SUBJECTIVE AND OBJECTIVE BOX
PROGRESS NOTE:   Authored by Darius Caballero MD  Pager [phone] [facility] | [phone] [facility]    Patient is a 61y old  Female who presents with a chief complaint of Covid (31 Dec 2021 07:29)      SUBJECTIVE / OVERNIGHT EVENTS:    MEDICATIONS  (STANDING):  chlorhexidine 0.12% Liquid 15 milliLiter(s) Oral Mucosa every 12 hours  chlorhexidine 2% Cloths 1 Application(s) Topical daily  chlorhexidine 4% Liquid 1 Application(s) Topical <User Schedule>  dexAMETHasone  Injectable 6 milliGRAM(s) IV Push daily  dextrose 40% Gel 15 Gram(s) Oral once  dextrose 5%. 1000 milliLiter(s) (100 mL/Hr) IV Continuous <Continuous>  dextrose 5%. 1000 milliLiter(s) (50 mL/Hr) IV Continuous <Continuous>  dextrose 50% Injectable 25 Gram(s) IV Push once  dextrose 50% Injectable 12.5 Gram(s) IV Push once  dextrose 50% Injectable 25 Gram(s) IV Push once  enoxaparin Injectable 40 milliGRAM(s) SubCutaneous every 24 hours  fentaNYL   Infusion. 2 MICROgram(s)/kG/Hr (8.36 mL/Hr) IV Continuous <Continuous>  glucagon  Injectable 1 milliGRAM(s) IntraMuscular once  insulin lispro (ADMELOG) corrective regimen sliding scale   SubCutaneous every 6 hours  insulin NPH human recombinant 30 Unit(s) SubCutaneous every 6 hours  lidocaine   4% Patch 1 Patch Transdermal daily  midazolam Infusion 0.02 mG/kG/Hr (0.84 mL/Hr) IV Continuous <Continuous>  multivitamin 1 Tablet(s) Oral daily  norepinephrine Infusion 0.05 MICROgram(s)/kG/Min (3.92 mL/Hr) IV Continuous <Continuous>  petrolatum Ophthalmic Ointment 1 Application(s) Both EYES two times a day  piperacillin/tazobactam IVPB.. 3.375 Gram(s) IV Intermittent every 8 hours  polyethylene glycol 3350 17 Gram(s) Oral daily  propofol Infusion 30 MICROgram(s)/kG/Min (7.52 mL/Hr) IV Continuous <Continuous>  senna Syrup 10 milliLiter(s) Oral daily    MEDICATIONS  (PRN):  acetaminophen     Tablet .. 650 milliGRAM(s) Oral every 6 hours PRN Temp greater or equal to 38C (100.4F)  ALBUTerol    90 MICROgram(s) HFA Inhaler 2 Puff(s) Inhalation every 4 hours PRN Shortness of Breath and/or Wheezing      I&O's Summary    30 Dec 2021 07:01  -  31 Dec 2021 07:00  --------------------------------------------------------  IN: 2753 mL / OUT: 1515 mL / NET: 1238 mL    31 Dec 2021 07:01  -  2022 06:39  --------------------------------------------------------  IN: 3564.4 mL / OUT: 2105 mL / NET: 1459.4 mL        PHYSICAL EXAM:  Vital Signs Last 24 Hrs  T(C): 36 (2022 04:00), Max: 37.2 (31 Dec 2021 08:00)  T(F): 96.8 (2022 04:00), Max: 99 (31 Dec 2021 08:00)  HR: 51 (2022 06:00) (47 - 68)  BP: --  BP(mean): --  RR: 18 (2022 06:00) (16 - 29)  SpO2: 100% (2022 06:00) (95% - 100%)        LABS:                        8.4    19.27 )-----------( 118      ( 2022 05:28 )             25.5     -01    146<H>  |  110<H>  |  26<H>  ----------------------------<  302<H>  4.2   |  29  |  0.60    Ca    8.3<L>      2022 05:30  Phos  3.2     01-  Mg     2.60     -    TPro  5.2<L>  /  Alb  2.4<L>  /  TBili  0.3  /  DBili  <0.2  /  AST  30  /  ALT  24  /  AlkPhos  73  01-    PT/INR - ( 2022 05:28 )   PT: 10.8 sec;   INR: 0.94 ratio               Urinalysis Basic - ( 30 Dec 2021 12:34 )    Color: Yellow / Appearance: Clear / S.023 / pH: x  Gluc: x / Ketone: Negative  / Bili: Negative / Urobili: <2 mg/dL   Blood: x / Protein: 30 mg/dL / Nitrite: Negative   Leuk Esterase: Negative / RBC: 2-5 /HPF / WBC 0-2 /HPF   Sq Epi: x / Non Sq Epi: Occasional / Bacteria: x        Culture - Blood (collected 30 Dec 2021 18:26)  Source: .Blood Blood-Venous  Preliminary Report (31 Dec 2021 19:01):    No growth to date.    Culture - Blood (collected 30 Dec 2021 18:26)  Source: .Blood Blood-Peripheral  Preliminary Report (31 Dec 2021 19:01):    No growth to date.       PROGRESS NOTE:   Authored by Darius Caballero MD  Pager [phone] [facility] | [phone] [facility]    Patient is a 61y old  Female who presents with a chief complaint of Covid (31 Dec 2021 07:29)      SUBJECTIVE / OVERNIGHT EVENTS:  Afebrile. Weaned off versed.    MEDICATIONS  (STANDING):  chlorhexidine 0.12% Liquid 15 milliLiter(s) Oral Mucosa every 12 hours  chlorhexidine 2% Cloths 1 Application(s) Topical daily  chlorhexidine 4% Liquid 1 Application(s) Topical <User Schedule>  dexAMETHasone  Injectable 6 milliGRAM(s) IV Push daily  dextrose 40% Gel 15 Gram(s) Oral once  dextrose 5%. 1000 milliLiter(s) (100 mL/Hr) IV Continuous <Continuous>  dextrose 5%. 1000 milliLiter(s) (50 mL/Hr) IV Continuous <Continuous>  dextrose 50% Injectable 25 Gram(s) IV Push once  dextrose 50% Injectable 12.5 Gram(s) IV Push once  dextrose 50% Injectable 25 Gram(s) IV Push once  enoxaparin Injectable 40 milliGRAM(s) SubCutaneous every 24 hours  fentaNYL   Infusion. 2 MICROgram(s)/kG/Hr (8.36 mL/Hr) IV Continuous <Continuous>  glucagon  Injectable 1 milliGRAM(s) IntraMuscular once  insulin lispro (ADMELOG) corrective regimen sliding scale   SubCutaneous every 6 hours  insulin NPH human recombinant 30 Unit(s) SubCutaneous every 6 hours  lidocaine   4% Patch 1 Patch Transdermal daily  midazolam Infusion 0.02 mG/kG/Hr (0.84 mL/Hr) IV Continuous <Continuous>  multivitamin 1 Tablet(s) Oral daily  norepinephrine Infusion 0.05 MICROgram(s)/kG/Min (3.92 mL/Hr) IV Continuous <Continuous>  petrolatum Ophthalmic Ointment 1 Application(s) Both EYES two times a day  piperacillin/tazobactam IVPB.. 3.375 Gram(s) IV Intermittent every 8 hours  polyethylene glycol 3350 17 Gram(s) Oral daily  propofol Infusion 30 MICROgram(s)/kG/Min (7.52 mL/Hr) IV Continuous <Continuous>  senna Syrup 10 milliLiter(s) Oral daily    MEDICATIONS  (PRN):  acetaminophen     Tablet .. 650 milliGRAM(s) Oral every 6 hours PRN Temp greater or equal to 38C (100.4F)  ALBUTerol    90 MICROgram(s) HFA Inhaler 2 Puff(s) Inhalation every 4 hours PRN Shortness of Breath and/or Wheezing      I&O's Summary    30 Dec 2021 07:01  -  31 Dec 2021 07:00  --------------------------------------------------------  IN: 2753 mL / OUT: 1515 mL / NET: 1238 mL    31 Dec 2021 07:01  -  2022 06:39  --------------------------------------------------------  IN: 3564.4 mL / OUT: 2105 mL / NET: 1459.4 mL        PHYSICAL EXAM:  Vital Signs Last 24 Hrs  T(C): 36 (2022 04:00), Max: 37.2 (31 Dec 2021 08:00)  T(F): 96.8 (2022 04:00), Max: 99 (31 Dec 2021 08:00)  HR: 51 (2022 06:00) (47 - 68)  BP: --  BP(mean): --  RR: 18 (2022 06:00) (16 - 29)  SpO2: 100% (2022 06:00) (95% - 100%)    General: sedated, intubated, contractures  HEENT: NC/AT; PERRL, clear conjunctiva  Neck: supple  Respiratory: Mechanical breath sounds  Cardiovascular: +S1/S2; RRR  Abdomen: soft, NT/ND; +BS x4  Extremities: WWP, 2+ peripheral pulses b/l; no LE edema, notable muscle wasting in lower extremities  Skin: normal color and turgor; no rash  Neurological: unable to assess    LABS:                        8.4    19.27 )-----------( 118      ( 2022 05:28 )             25.5         146<H>  |  110<H>  |  26<H>  ----------------------------<  302<H>  4.2   |  29  |  0.60    Ca    8.3<L>      2022 05:30  Phos  3.2       Mg     2.60         TPro  5.2<L>  /  Alb  2.4<L>  /  TBili  0.3  /  DBili  <0.2  /  AST  30  /  ALT  24  /  AlkPhos  73      PT/INR - ( 2022 05:28 )   PT: 10.8 sec;   INR: 0.94 ratio               Urinalysis Basic - ( 30 Dec 2021 12:34 )    Color: Yellow / Appearance: Clear / S.023 / pH: x  Gluc: x / Ketone: Negative  / Bili: Negative / Urobili: <2 mg/dL   Blood: x / Protein: 30 mg/dL / Nitrite: Negative   Leuk Esterase: Negative / RBC: 2-5 /HPF / WBC 0-2 /HPF   Sq Epi: x / Non Sq Epi: Occasional / Bacteria: x        Culture - Blood (collected 30 Dec 2021 18:26)  Source: .Blood Blood-Venous  Preliminary Report (31 Dec 2021 19:01):    No growth to date.    Culture - Blood (collected 30 Dec 2021 18:26)  Source: .Blood Blood-Peripheral  Preliminary Report (31 Dec 2021 19:01):    No growth to date.

## 2022-01-02 NOTE — PROVIDER CONTACT NOTE (HYPOGLYCEMIA EVENT) - NS PROVIDER CONTACT BACKGROUND-HYPO
Age: 61y    Gender: Female    POCT Blood Glucose:  57 mg/dL (01-02-22 @ 17:29)  63 mg/dL (01-02-22 @ 17:28)  88 mg/dL (01-02-22 @ 11:45)  102 mg/dL (01-02-22 @ 05:18)  200 mg/dL (01-01-22 @ 23:29)  248 mg/dL (01-01-22 @ 18:14)      eMAR:dexAMETHasone  Injectable   6 milliGRAM(s) IV Push (01-02-22 @ 05:12)    insulin lispro (ADMELOG) corrective regimen sliding scale   2 Unit(s) SubCutaneous (01-01-22 @ 23:30)   4 Unit(s) SubCutaneous (01-01-22 @ 18:19)    insulin NPH human recombinant   33 Unit(s) SubCutaneous (01-02-22 @ 06:28)   33 Unit(s) SubCutaneous (01-01-22 @ 23:31)   33 Unit(s) SubCutaneous (01-01-22 @ 18:19)

## 2022-01-02 NOTE — PROGRESS NOTE ADULT - SUBJECTIVE AND OBJECTIVE BOX
INTERVAL HPI/OVERNIGHT EVENTS:    SUBJECTIVE: Patient seen and examined at bedside.     OBJECTIVE:    VITAL SIGNS:  ICU Vital Signs Last 24 Hrs  T(C): 37.8 (02 Jan 2022 04:00), Max: 37.8 (02 Jan 2022 04:00)  T(F): 100 (02 Jan 2022 04:00), Max: 100 (02 Jan 2022 04:00)  HR: 81 (02 Jan 2022 07:00) (53 - 105)  BP: --  BP(mean): --  ABP: 104/58 (02 Jan 2022 07:00) (95/50 - 137/70)  ABP(mean): 72 (02 Jan 2022 07:00) (65 - 96)  RR: 26 (02 Jan 2022 07:00) (17 - 34)  SpO2: 90% (02 Jan 2022 07:00) (90% - 100%)    Mode: AC/ CMV (Assist Control/ Continuous Mandatory Ventilation), RR (machine): 18, TV (machine): 300, FiO2: 40, PEEP: 8, ITime: 0.65, MAP: 15, PIP: 30    01-01 @ 07:01  -  01-02 @ 07:00  --------------------------------------------------------  IN: 2879 mL / OUT: 1935 mL / NET: 944 mL      CAPILLARY BLOOD GLUCOSE      POCT Blood Glucose.: 102 mg/dL (02 Jan 2022 05:18)      PHYSICAL EXAM:    General: NAD  HEENT: NC/AT; PERRL, clear conjunctiva  Neck: supple  Respiratory: CTA b/l  Cardiovascular: +S1/S2; RRR  Abdomen: soft, NT/ND; +BS x4  Extremities: WWP, 2+ peripheral pulses b/l; no LE edema  Skin: normal color and turgor; no rash  Neurological:    MEDICATIONS:  MEDICATIONS  (STANDING):  chlorhexidine 0.12% Liquid 15 milliLiter(s) Oral Mucosa every 12 hours  chlorhexidine 4% Liquid 1 Application(s) Topical <User Schedule>  dexAMETHasone  Injectable 6 milliGRAM(s) IV Push daily  dexMEDEtomidine Infusion 0.1 MICROgram(s)/kG/Hr (1.05 mL/Hr) IV Continuous <Continuous>  dextrose 40% Gel 15 Gram(s) Oral once  dextrose 5%. 1000 milliLiter(s) (50 mL/Hr) IV Continuous <Continuous>  dextrose 5%. 1000 milliLiter(s) (100 mL/Hr) IV Continuous <Continuous>  dextrose 50% Injectable 25 Gram(s) IV Push once  dextrose 50% Injectable 12.5 Gram(s) IV Push once  dextrose 50% Injectable 25 Gram(s) IV Push once  enoxaparin Injectable 40 milliGRAM(s) SubCutaneous every 24 hours  glucagon  Injectable 1 milliGRAM(s) IntraMuscular once  insulin lispro (ADMELOG) corrective regimen sliding scale   SubCutaneous every 6 hours  insulin NPH human recombinant 33 Unit(s) SubCutaneous every 6 hours  lidocaine   4% Patch 1 Patch Transdermal daily  multivitamin 1 Tablet(s) Oral daily  norepinephrine Infusion 0.05 MICROgram(s)/kG/Min (3.92 mL/Hr) IV Continuous <Continuous>  petrolatum Ophthalmic Ointment 1 Application(s) Both EYES two times a day  piperacillin/tazobactam IVPB.. 3.375 Gram(s) IV Intermittent every 8 hours  polyethylene glycol 3350 17 Gram(s) Oral daily  propofol Infusion 30 MICROgram(s)/kG/Min (7.52 mL/Hr) IV Continuous <Continuous>  senna Syrup 10 milliLiter(s) Oral daily    MEDICATIONS  (PRN):  acetaminophen     Tablet .. 650 milliGRAM(s) Oral every 6 hours PRN Temp greater or equal to 38C (100.4F)  ALBUTerol    90 MICROgram(s) HFA Inhaler 2 Puff(s) Inhalation every 4 hours PRN Shortness of Breath and/or Wheezing      ALLERGIES:  Allergies    No Known Allergies    Intolerances        LABS:                        8.3    14.56 )-----------( 123      ( 02 Jan 2022 03:11 )             24.9     Hemoglobin: 8.3 g/dL (01-02 @ 03:11)  Hemoglobin: 8.4 g/dL (01-01 @ 05:28)  Hemoglobin: 9.2 g/dL (12-31 @ 06:55)  Hemoglobin: 9.9 g/dL (12-30 @ 02:16)  Hemoglobin: 10.4 g/dL (12-29 @ 01:32)    CBC Full  -  ( 02 Jan 2022 03:11 )  WBC Count : 14.56 K/uL  RBC Count : 2.94 M/uL  Hemoglobin : 8.3 g/dL  Hematocrit : 24.9 %  Platelet Count - Automated : 123 K/uL  Mean Cell Volume : 84.7 fL  Mean Cell Hemoglobin : 28.2 pg  Mean Cell Hemoglobin Concentration : 33.3 gm/dL  Auto Neutrophil # : x  Auto Lymphocyte # : x  Auto Monocyte # : x  Auto Eosinophil # : x  Auto Basophil # : x  Auto Neutrophil % : x  Auto Lymphocyte % : x  Auto Monocyte % : x  Auto Eosinophil % : x  Auto Basophil % : x    01-02    144  |  107  |  30<H>  ----------------------------<  128<H>  3.4<L>   |  28  |  0.60    Ca    8.5      02 Jan 2022 03:11  Phos  1.9     01-02  Mg     2.20     01-02    TPro  5.2<L>  /  Alb  2.4<L>  /  TBili  0.3  /  DBili  <0.2  /  AST  30  /  ALT  24  /  AlkPhos  73  01-01    Creatinine Trend: 0.60<--, 0.60<--, 0.72<--, 0.84<--, 0.69<--, 0.69<--  LIVER FUNCTIONS - ( 01 Jan 2022 05:30 )  Alb: 2.4 g/dL / Pro: 5.2 g/dL / ALK PHOS: 73 U/L / ALT: 24 U/L / AST: 30 U/L / GGT: x           PT/INR - ( 02 Jan 2022 06:31 )   PT: 11.5 sec;   INR: 1.00 ratio             hs Troponin:    ABG - ( 02 Jan 2022 03:11 )  pH, Arterial: 7.54  pH, Blood: x     /  pCO2: 35    /  pO2: 68    / HCO3: 30    / Base Excess: 6.9   /  SaO2: 94.8                03:11 - ABG - pH: 7.54  |  pCO2: 35    |  pO2: 68    | Lactate:       | BE: 6.9    12:50 - ABG - pH: 7.44  |  pCO2: 44    |  pO2: 73    | Lactate:       | BE: 5.2          CSF:                      EKG:   MICROBIOLOGY:    Culture - Sputum (collected 01 Jan 2022 02:13)  Source: .Sputum Sputum  Gram Stain (01 Jan 2022 06:57):    Few polymorphonuclear leukocytes per low power field    No Squamous epithelial cells per low power field    No organisms seen per oil power field  Preliminary Report (01 Jan 2022 18:27):    Normal Respiratory Lydia present    Culture - Blood (collected 30 Dec 2021 18:26)  Source: .Blood Blood-Venous  Preliminary Report (31 Dec 2021 19:01):    No growth to date.    Culture - Blood (collected 30 Dec 2021 18:26)  Source: .Blood Blood-Peripheral  Preliminary Report (31 Dec 2021 19:01):    No growth to date.      IMAGING:      Labs, imaging, EKG personally reviewed    RADIOLOGY & ADDITIONAL TESTS: Reviewed.     INTERVAL HPI/OVERNIGHT EVENTS: No acute events overnight    SUBJECTIVE: Patient seen and examined at bedside. Sedated, intubated, unresponsive to pain.     OBJECTIVE:    VITAL SIGNS:  ICU Vital Signs Last 24 Hrs  T(C): 37.8 (02 Jan 2022 04:00), Max: 37.8 (02 Jan 2022 04:00)  T(F): 100 (02 Jan 2022 04:00), Max: 100 (02 Jan 2022 04:00)  HR: 81 (02 Jan 2022 07:00) (53 - 105)  BP: --  BP(mean): --  ABP: 104/58 (02 Jan 2022 07:00) (95/50 - 137/70)  ABP(mean): 72 (02 Jan 2022 07:00) (65 - 96)  RR: 26 (02 Jan 2022 07:00) (17 - 34)  SpO2: 90% (02 Jan 2022 07:00) (90% - 100%)    Mode: AC/ CMV (Assist Control/ Continuous Mandatory Ventilation), RR (machine): 18, TV (machine): 300, FiO2: 40, PEEP: 8, ITime: 0.65, MAP: 15, PIP: 30    01-01 @ 07:01  -  01-02 @ 07:00  --------------------------------------------------------  IN: 2879 mL / OUT: 1935 mL / NET: 944 mL      CAPILLARY BLOOD GLUCOSE      POCT Blood Glucose.: 102 mg/dL (02 Jan 2022 05:18)      PHYSICAL EXAM:    General: sedated, intubated, contractures  HEENT: NC/AT; PERRL, clear conjunctiva  Neck: supple  Respiratory: Mechanical breath sounds, lungs clear to auscultation  Cardiovascular: +S1/S2; RRR  Abdomen: soft, NT/ND; +BS x4  Extremities: WWP, 2+ peripheral pulses b/l; no LE edema, notable muscle wasting in lower extremities  Skin: normal color and turgor; no rash  Neurological: unable to assess    MEDICATIONS:  MEDICATIONS  (STANDING):  chlorhexidine 0.12% Liquid 15 milliLiter(s) Oral Mucosa every 12 hours  chlorhexidine 4% Liquid 1 Application(s) Topical <User Schedule>  dexAMETHasone  Injectable 6 milliGRAM(s) IV Push daily  dexMEDEtomidine Infusion 0.1 MICROgram(s)/kG/Hr (1.05 mL/Hr) IV Continuous <Continuous>  dextrose 40% Gel 15 Gram(s) Oral once  dextrose 5%. 1000 milliLiter(s) (50 mL/Hr) IV Continuous <Continuous>  dextrose 5%. 1000 milliLiter(s) (100 mL/Hr) IV Continuous <Continuous>  dextrose 50% Injectable 25 Gram(s) IV Push once  dextrose 50% Injectable 12.5 Gram(s) IV Push once  dextrose 50% Injectable 25 Gram(s) IV Push once  enoxaparin Injectable 40 milliGRAM(s) SubCutaneous every 24 hours  glucagon  Injectable 1 milliGRAM(s) IntraMuscular once  insulin lispro (ADMELOG) corrective regimen sliding scale   SubCutaneous every 6 hours  insulin NPH human recombinant 33 Unit(s) SubCutaneous every 6 hours  lidocaine   4% Patch 1 Patch Transdermal daily  multivitamin 1 Tablet(s) Oral daily  norepinephrine Infusion 0.05 MICROgram(s)/kG/Min (3.92 mL/Hr) IV Continuous <Continuous>  petrolatum Ophthalmic Ointment 1 Application(s) Both EYES two times a day  piperacillin/tazobactam IVPB.. 3.375 Gram(s) IV Intermittent every 8 hours  polyethylene glycol 3350 17 Gram(s) Oral daily  propofol Infusion 30 MICROgram(s)/kG/Min (7.52 mL/Hr) IV Continuous <Continuous>  senna Syrup 10 milliLiter(s) Oral daily    MEDICATIONS  (PRN):  acetaminophen     Tablet .. 650 milliGRAM(s) Oral every 6 hours PRN Temp greater or equal to 38C (100.4F)  ALBUTerol    90 MICROgram(s) HFA Inhaler 2 Puff(s) Inhalation every 4 hours PRN Shortness of Breath and/or Wheezing      ALLERGIES:  Allergies    No Known Allergies    Intolerances        LABS:                        8.3    14.56 )-----------( 123      ( 02 Jan 2022 03:11 )             24.9     Hemoglobin: 8.3 g/dL (01-02 @ 03:11)  Hemoglobin: 8.4 g/dL (01-01 @ 05:28)  Hemoglobin: 9.2 g/dL (12-31 @ 06:55)  Hemoglobin: 9.9 g/dL (12-30 @ 02:16)  Hemoglobin: 10.4 g/dL (12-29 @ 01:32)    CBC Full  -  ( 02 Jan 2022 03:11 )  WBC Count : 14.56 K/uL  RBC Count : 2.94 M/uL  Hemoglobin : 8.3 g/dL  Hematocrit : 24.9 %  Platelet Count - Automated : 123 K/uL  Mean Cell Volume : 84.7 fL  Mean Cell Hemoglobin : 28.2 pg  Mean Cell Hemoglobin Concentration : 33.3 gm/dL  Auto Neutrophil # : x  Auto Lymphocyte # : x  Auto Monocyte # : x  Auto Eosinophil # : x  Auto Basophil # : x  Auto Neutrophil % : x  Auto Lymphocyte % : x  Auto Monocyte % : x  Auto Eosinophil % : x  Auto Basophil % : x    01-02    144  |  107  |  30<H>  ----------------------------<  128<H>  3.4<L>   |  28  |  0.60    Ca    8.5      02 Jan 2022 03:11  Phos  1.9     01-02  Mg     2.20     01-02    TPro  5.2<L>  /  Alb  2.4<L>  /  TBili  0.3  /  DBili  <0.2  /  AST  30  /  ALT  24  /  AlkPhos  73  01-01    Creatinine Trend: 0.60<--, 0.60<--, 0.72<--, 0.84<--, 0.69<--, 0.69<--  LIVER FUNCTIONS - ( 01 Jan 2022 05:30 )  Alb: 2.4 g/dL / Pro: 5.2 g/dL / ALK PHOS: 73 U/L / ALT: 24 U/L / AST: 30 U/L / GGT: x           PT/INR - ( 02 Jan 2022 06:31 )   PT: 11.5 sec;   INR: 1.00 ratio             hs Troponin:    ABG - ( 02 Jan 2022 03:11 )  pH, Arterial: 7.54  pH, Blood: x     /  pCO2: 35    /  pO2: 68    / HCO3: 30    / Base Excess: 6.9   /  SaO2: 94.8                03:11 - ABG - pH: 7.54  |  pCO2: 35    |  pO2: 68    | Lactate:       | BE: 6.9    12:50 - ABG - pH: 7.44  |  pCO2: 44    |  pO2: 73    | Lactate:       | BE: 5.2      CSF:      EKG:   MICROBIOLOGY:    Culture - Sputum (collected 01 Jan 2022 02:13)  Source: .Sputum Sputum  Gram Stain (01 Jan 2022 06:57):    Few polymorphonuclear leukocytes per low power field    No Squamous epithelial cells per low power field    No organisms seen per oil power field  Preliminary Report (01 Jan 2022 18:27):    Normal Respiratory Lydia present    Culture - Blood (collected 30 Dec 2021 18:26)  Source: .Blood Blood-Venous  Preliminary Report (31 Dec 2021 19:01):    No growth to date.    Culture - Blood (collected 30 Dec 2021 18:26)  Source: .Blood Blood-Peripheral  Preliminary Report (31 Dec 2021 19:01):    No growth to date.      IMAGING:      Labs, imaging, EKG personally reviewed    RADIOLOGY & ADDITIONAL TESTS: Reviewed.

## 2022-01-02 NOTE — PROGRESS NOTE ADULT - ATTENDING COMMENTS
61 F with polio, htn, hld, DM here with L hip dislocation and acute hypoxemic respiratory failure and acute hypercapnic respiratory failure due to COVID19 ARDS requiring intubation.    Keep sedated, no need for paralysis. at this time.  P/f ratio improving, will lower fio2 to 40% and peep to 8.  Will repeat abg, wean sedation as tolerated.  On empiric abx, f/u cultures  Continue with steroids  steroid induced hyperglycemia, on NPH  hypernatremia resolving, cut back on free water  distributive shock, continue with vasopressors    Critically ill patient requiring frequent bedside visits with therapy changes.

## 2022-01-02 NOTE — PROGRESS NOTE ADULT - ASSESSMENT
60 y/o F, PMH of Polio (bedbound at baseline w/ chronic contractures), HTN, HLD, Type 2 DM (unknown if on insulin), presented to ED w/ c/o left hip pain and dry cough x 1 week, found to be Covid+ and have L hip dislocation for which ortho was consulted for which it was determined she needed no acute surgical intervention. Course complicated by persistent hypoxia in the setting of multifocal pneumonia 2/2 COVID and/or aspiration.     Neuro:  Baseline nonverbal but responds to commands. Currently intubated and sedated.   -c/w prop  -Weaned off versed 12/31 and fentanyl off this AM (1/1)    Respiratory:   #Acute hypoxic respiratory failure-   -P/F 47 pre-intubation. Severe ARDS.    - s/p intubation on 12/27  - FiO2 continuously downtrended since yesterday from 80% to 50% (1/1), decrease to 40% and PEEP 8 and follow up ABG (1/1)  -COVID +, was being treated with dexamethasone and remdesivir. decadron to stop 1/5, remdesivir discontinued upon arrival to MICU  -Empirically tx for CAP abx. Previously on azithro and CTX but urine legionella negative and pt febrile so coverage broadened to zosyn on 12/30.Continue Zosyn for 5 days, anticipate end on 1/3  -Previous CT negative for PE, previously on lovenox BID however after discussion with pharmacy normal ppx dose is appropriate      Cardiac:   -Currently on levophed (started 12/28), titrate to MAP 65  #HTN  #Hyperlipidemia- Continue statin      GI:  Tube feeds  bowel regimen as pt on fent gtt    /Renal:  #Hypernatremia- increased free water to 400 q4h and will check q12BMP  #T2DM- Humulin increased from 14 to 22 U on 12/31, increased to 30U q6h (12.31). Follow up SSI requirements, likely also increased in setting of steroids. No AG.    MSK:  #Polio- Chronic contractures. No intervention at this time. Follow-up nutrition recommendations.   #Chronic Hip dislocations- Ortho following. No acute surgical intervention needed. Will ctm.     ID:  - urine legionella neg, Azithro dc'd on 12/29  -CTX broadened to zosyn on 12/30 as pt febrile. Anticipate end date 1/3  -blood cx NGTD, repeat cultures pending  -urine cultures growing GBS  -Sputum culture 1/1 NGTD    Prophylaxis:  #Lovenox ppx    GOC:   Appreciate palliative care, pt DNR. BRANDEE signed and in chart   60 y/o F, PMH of Polio (bedbound at baseline w/ chronic contractures), HTN, HLD, Type 2 DM (unknown if on insulin), presented to ED w/ c/o left hip pain and dry cough x 1 week, found to be Covid+ and have L hip dislocation for which ortho was consulted for which it was determined she needed no acute surgical intervention. Course complicated by persistent hypoxia in the setting of multifocal pneumonia 2/2 COVID and/or aspiration.     Neuro:  Baseline nonverbal but responds to commands. Currently intubated and sedated.   - wean off prop today  -Weaned off versed 12/31 and fentanyl off this AM (1/1)    Respiratory:   #Acute hypoxic respiratory failure-   -P/F 47 pre-intubation. Severe ARDS.    - s/p intubation on 12/27  - FiO2 continuously downtrended since yesterday, now on 40% and PEEP decreased to 5. ABG pending.   -COVID +, was being treated with dexamethasone and remdesivir. decadron to stop 1/5, remdesivir discontinued upon arrival to MICU  -Empirically tx for CAP abx. Previously on azithro and CTX but urine legionella negative and pt febrile so coverage broadened to zosyn on 12/30.Continue Zosyn for 5 days, anticipate end on 1/3  -Previous CT negative for PE, previously on lovenox BID however after discussion with pharmacy normal ppx dose is appropriate  -CPAP trial later today      Cardiac:   -Currently holding levo  #HTN  #Hyperlipidemia-holding statin      GI:  Tube feeds  Rectal pouch in place  -New transaminitis, will follow up repeat CMP, if transaminases still rising will obtain RUQ sono    /Renal:  #Hypernatremia- improved significantly, will cut free water in half to 400 q8h and recheck BMP in PM. If sodium holds or decreases, will discontinue free water tomorrow  #T2DM- Humulin decreased from 33 to 25, will follow up next FS and adjust NPH accordingly.     MSK:  #Polio- Chronic contractures. No intervention at this time. Follow-up nutrition recommendations.   #Chronic Hip dislocations- Ortho following. No acute surgical intervention needed. Will ctm.     ID:  - urine legionella neg, Azithro dc'd on 12/29  -CTX broadened to zosyn on 12/30 as pt febrile. Anticipate end date 1/3  -blood cx NGTD, repeat cultures pending  -urine cultures growing GBS  -Sputum culture 1/1 NGTD    Prophylaxis:  #Lovenox ppx    GOC:   Appreciate palliative care, pt DNR. BRANDEE signed and in chart

## 2022-01-03 NOTE — PROGRESS NOTE ADULT - ASSESSMENT
62 y/o F, PMH of Polio (bedbound at baseline w/ chronic contractures), HTN, HLD, Type 2 DM (unknown if on insulin), presented to ED w/ c/o left hip pain and dry cough x 1 week, found to be Covid+ and have L hip dislocation for which ortho was consulted for which it was determined she needed no acute surgical intervention. Course complicated by persistent hypoxia in the setting of multifocal pneumonia 2/2 COVID and/or aspiration.     Neuro:  Baseline nonverbal but responds to commands. Currently intubated and sedated.   - wean off prop today  -Weaned off versed 12/31 and fentanyl off this AM (1/1)    Respiratory:   #Acute hypoxic respiratory failure-   -P/F 47 pre-intubation. Severe ARDS.    - s/p intubation on 12/27  - FiO2 continuously downtrended since yesterday, now on 40% and PEEP decreased to 5. ABG pending.   -COVID +, was being treated with dexamethasone and remdesivir. decadron to stop 1/5, remdesivir discontinued upon arrival to MICU  -Empirically tx for CAP abx. Previously on azithro and CTX but urine legionella negative and pt febrile so coverage broadened to zosyn on 12/30.Continue Zosyn for 5 days, anticipate end on 1/3  -Previous CT negative for PE, previously on lovenox BID however after discussion with pharmacy normal ppx dose is appropriate  -CPAP trial later today      Cardiac:   -Currently holding levo  #HTN  #Hyperlipidemia-holding statin      GI:  Tube feeds  Rectal pouch in place  -New transaminitis, will follow up repeat CMP, if transaminases still rising will obtain RUQ sono    /Renal:  #Hypernatremia- improved significantly, will cut free water in half to 400 q8h and recheck BMP in PM. If sodium holds or decreases, will discontinue free water tomorrow  #T2DM- Humulin decreased from 33 to 25, will follow up next FS and adjust NPH accordingly.     MSK:  #Polio- Chronic contractures. No intervention at this time. Follow-up nutrition recommendations.   #Chronic Hip dislocations- Ortho following. No acute surgical intervention needed. Will ctm.     ID:  - urine legionella neg, Azithro dc'd on 12/29  -CTX broadened to zosyn on 12/30 as pt febrile. Anticipate end date 1/3  -blood cx NGTD, repeat cultures pending  -urine cultures growing GBS  -Sputum culture 1/1 NGTD    Prophylaxis:  #Lovenox ppx    GOC:   Appreciate palliative care, pt DNR. BRANDEE signed and in chart   62 y/o F, PMH of Polio (bedbound at baseline w/ chronic contractures), HTN, HLD, Type 2 DM (unknown if on insulin), presented to ED w/ c/o left hip pain and dry cough x 1 week, found to be Covid+ and have L hip dislocation for which ortho was consulted for which it was determined she needed no acute surgical intervention. Course complicated by persistent hypoxia in the setting of multifocal pneumonia 2/2 COVID and/or aspiration.     Neuro:  Baseline nonverbal but responds to commands. Currently intubated and sedated.   -failed CPAP overnight, resedated on precedex, versed    Respiratory:   #Acute hypoxic respiratory failure-   -P/F 47 pre-intubation. Severe ARDS.    - s/p intubation on 12/27  - Overbreathing vent overnight, CXR showed subq emphysema, concern for barotrauma from dysynchrony from vent, will hold off on further cpap trials for now   -COVID +, was being treated with dexamethasone and remdesivir. Last day of decadron on 1/3, remdesivir discontinued upon arrival to MICU  -Empirically tx for CAP abx. Previously on azithro and CTX but urine legionella negative and pt febrile so coverage broadened to zosyn on 12/30.Continue Zosyn for 5 days, anticipate end on 1/3. Patient still febrile, will start meropenem pending sputum/blood/UA  -Previous CT negative for PE, previously on lovenox BID however after discussion with pharmacy normal ppx dose is appropriate  -Low threshold for stat CXR if pressure drops as patient at risk for developing PTX    Cardiac:   -Levo restarted given patient resedated  #HTN  #Hyperlipidemia-holding statin      GI:  Tube feeds  Rectal pouch in place  -Worsening transaminitis, RUQ showed fatty infiltration, suspect Covid hepatitis, however will continue to trend    /Renal:  #Hypernatremia- stable at 150, will continue free water 400 q8h.    #T2DM- Humulin decreased to 15, pt now off decadron, will monitor sugars and decrease insulin as needed    MSK:  #Polio- Chronic contractures. No intervention at this time. Follow-up nutrition recommendations.   #Chronic Hip dislocations- Ortho following. No acute surgical intervention needed. Will ctm.     ID:  - urine legionella neg, Azithro dc'd on 12/29  -CTX broadened to zosyn on 12/30 as pt febrile. Anticipate end date 1/3. However patient still febrile on 1/3, will start meropenem  -blood cx NGTD, repeat cultures pending  -urine cultures growing GBS  -Sputum culture 1/1 NGTD, repeat sent  -UA on 1/3 neg    Prophylaxis:  #Lovenox ppx    GOC:   Appreciate palliative care, pt DNR. BRANDEE signed and in chart

## 2022-01-03 NOTE — PROCEDURE NOTE - NSINDICATIONS_GEN_A_CORE
antibiotic therapy/emergency venous access
antibiotic therapy/emergency venous access/other
arterial puncture to obtain ABG's/blood sampling/critical patient/monitoring purposes
arterial puncture to obtain ABG's/critical patient/monitoring purposes

## 2022-01-03 NOTE — PROCEDURE NOTE - NSSITEPREP_SKIN_A_CORE
chlorhexidine/Adherence to aseptic technique: hand hygiene prior to donning barriers (gown, gloves), don cap and mask, sterile drape over patient
chlorhexidine/Adherence to aseptic technique: hand hygiene prior to donning barriers (gown, gloves), don cap and mask, sterile drape over patient
alcohol/chlorhexidine
chlorhexidine

## 2022-01-03 NOTE — CHART NOTE - NSCHARTNOTEFT_GEN_A_CORE
Pt with worsening respiratory status over night   Primary team will reach out to brother to update on changes   Will follow up tomorrow to offer support in order not to overwhelm family  Will continue to follow  Discussed with primary team

## 2022-01-03 NOTE — PROGRESS NOTE ADULT - ATTENDING COMMENTS
Patient examined and case reviewed in detail on bedside rounds  Critically ill and unstable on vent with severe COVID ARDS and limited mobility due to polio neuro injury  Frequent bedside visits with therapy change today.   I have personally provided 35+ minutes of critical care time concurrently with the resident/fellow; this excludes time spent on separate procedures.

## 2022-01-03 NOTE — PROCEDURE NOTE - NSPROCDETAILS_GEN_ALL_CORE
location identified, draped/prepped, sterile technique used, needle inserted/introduced
location identified, draped/prepped, sterile technique used, needle inserted/introduced/positive blood return obtained via catheter/connected to a pressurized flush line/sutured in place/hemostasis with direct pressure, dressing applied/Seldinger technique
dressing applied
location identified, draped/prepped, sterile technique used/blood seen on insertion/dressing applied/flushes easily/secured in place/sterile technique, catheter placed

## 2022-01-03 NOTE — PROGRESS NOTE ADULT - SUBJECTIVE AND OBJECTIVE BOX
INTERVAL HPI/OVERNIGHT EVENTS:    SUBJECTIVE: Patient seen and examined at bedside.     OBJECTIVE:    VITAL SIGNS:  ICU Vital Signs Last 24 Hrs  T(C): 37.9 (03 Jan 2022 04:00), Max: 38.4 (02 Jan 2022 08:00)  T(F): 100.2 (03 Jan 2022 04:00), Max: 101.2 (02 Jan 2022 08:00)  HR: 108 (03 Jan 2022 06:00) (69 - 108)  BP: --  BP(mean): --  ABP: 125/77 (03 Jan 2022 06:30) (82/45 - 130/78)  ABP(mean): 95 (03 Jan 2022 06:30) (50 - 95)  RR: 38 (03 Jan 2022 06:00) (18 - 38)  SpO2: 90% (03 Jan 2022 06:00) (90% - 100%)    Mode: AC/ CMV (Assist Control/ Continuous Mandatory Ventilation), RR (machine): 18, TV (machine): 380, FiO2: 100, PEEP: 5, MAP: 10, PIP: 21    01-02 @ 07:01  -  01-03 @ 07:00  --------------------------------------------------------  IN: 3434.8 mL / OUT: 4310 mL / NET: -875.2 mL      CAPILLARY BLOOD GLUCOSE      POCT Blood Glucose.: 206 mg/dL (03 Jan 2022 07:00)      PHYSICAL EXAM:    General: NAD  HEENT: NC/AT; PERRL, clear conjunctiva  Neck: supple  Respiratory: CTA b/l  Cardiovascular: +S1/S2; RRR  Abdomen: soft, NT/ND; +BS x4  Extremities: WWP, 2+ peripheral pulses b/l; no LE edema  Skin: normal color and turgor; no rash  Neurological:    MEDICATIONS:  MEDICATIONS  (STANDING):  albumin human  5% IVPB 250 milliLiter(s) IV Intermittent once  chlorhexidine 0.12% Liquid 15 milliLiter(s) Oral Mucosa every 12 hours  chlorhexidine 4% Liquid 1 Application(s) Topical <User Schedule>  dexMEDEtomidine Infusion 0.1 MICROgram(s)/kG/Hr (1.05 mL/Hr) IV Continuous <Continuous>  dextrose 40% Gel 15 Gram(s) Oral once  dextrose 5%. 1000 milliLiter(s) (50 mL/Hr) IV Continuous <Continuous>  dextrose 5%. 1000 milliLiter(s) (100 mL/Hr) IV Continuous <Continuous>  dextrose 50% Injectable 25 Gram(s) IV Push once  dextrose 50% Injectable 12.5 Gram(s) IV Push once  dextrose 50% Injectable 25 Gram(s) IV Push once  enoxaparin Injectable 40 milliGRAM(s) SubCutaneous every 24 hours  glucagon  Injectable 1 milliGRAM(s) IntraMuscular once  insulin lispro (ADMELOG) corrective regimen sliding scale   SubCutaneous every 6 hours  insulin NPH human recombinant 20 Unit(s) SubCutaneous every 6 hours  lidocaine   4% Patch 1 Patch Transdermal daily  midazolam Infusion 0.02 mG/kG/Hr (0.84 mL/Hr) IV Continuous <Continuous>  multivitamin 1 Tablet(s) Oral daily  norepinephrine Infusion 0.05 MICROgram(s)/kG/Min (3.92 mL/Hr) IV Continuous <Continuous>  petrolatum Ophthalmic Ointment 1 Application(s) Both EYES two times a day  piperacillin/tazobactam IVPB.. 3.375 Gram(s) IV Intermittent every 8 hours  polyethylene glycol 3350 17 Gram(s) Oral daily  propofol Infusion 30 MICROgram(s)/kG/Min (7.52 mL/Hr) IV Continuous <Continuous>  senna Syrup 10 milliLiter(s) Oral daily    MEDICATIONS  (PRN):  acetaminophen     Tablet .. 650 milliGRAM(s) Oral every 6 hours PRN Temp greater or equal to 38C (100.4F)  ALBUTerol    90 MICROgram(s) HFA Inhaler 2 Puff(s) Inhalation every 4 hours PRN Shortness of Breath and/or Wheezing      ALLERGIES:  Allergies    No Known Allergies    Intolerances        LABS:                        8.9    21.38 )-----------( 147      ( 03 Jan 2022 04:03 )             27.6     Hemoglobin: 8.9 g/dL (01-03 @ 04:03)  Hemoglobin: 8.3 g/dL (01-02 @ 03:11)  Hemoglobin: 8.4 g/dL (01-01 @ 05:28)  Hemoglobin: 9.2 g/dL (12-31 @ 06:55)  Hemoglobin: 9.9 g/dL (12-30 @ 02:16)    CBC Full  -  ( 03 Jan 2022 04:03 )  WBC Count : 21.38 K/uL  RBC Count : 3.12 M/uL  Hemoglobin : 8.9 g/dL  Hematocrit : 27.6 %  Platelet Count - Automated : 147 K/uL  Mean Cell Volume : 88.5 fL  Mean Cell Hemoglobin : 28.5 pg  Mean Cell Hemoglobin Concentration : 32.2 gm/dL  Auto Neutrophil # : x  Auto Lymphocyte # : x  Auto Monocyte # : x  Auto Eosinophil # : x  Auto Basophil # : x  Auto Neutrophil % : x  Auto Lymphocyte % : x  Auto Monocyte % : x  Auto Eosinophil % : x  Auto Basophil % : x    01-03    150<H>  |  113<H>  |  30<H>  ----------------------------<  179<H>  4.3   |  26  |  0.67    Ca    8.5      03 Jan 2022 04:03  Phos  5.0     01-03  Mg     2.40     01-03    TPro  5.8<L>  /  Alb  2.3<L>  /  TBili  0.7  /  DBili  x   /  AST  204<H>  /  ALT  158<H>  /  AlkPhos  115  01-03    Creatinine Trend: 0.67<--, 0.69<--, 0.67<--, 0.60<--, 0.60<--, 0.72<--  LIVER FUNCTIONS - ( 03 Jan 2022 04:03 )  Alb: 2.3 g/dL / Pro: 5.8 g/dL / ALK PHOS: 115 U/L / ALT: 158 U/L / AST: 204 U/L / GGT: x           PT/INR - ( 02 Jan 2022 06:31 )   PT: 11.5 sec;   INR: 1.00 ratio             hs Troponin:    ABG - ( 03 Jan 2022 06:15 )  pH, Arterial: 7.49  pH, Blood: x     /  pCO2: 36    /  pO2: 81    / HCO3: 27    / Base Excess: 3.9   /  SaO2: 95.8                06:15 - ABG - pH: 7.49  |  pCO2: 36    |  pO2: 81    | Lactate:       | BE: 3.9    04:03 - ABG - pH: 7.45  |  pCO2: 43    |  pO2: 108   | Lactate:       | BE: 5.4    22:41 - ABG - pH: 7.50  |  pCO2: 39    |  pO2: 101   | Lactate:       | BE: 6.7    16:36 - ABG - pH: 7.44  |  pCO2: 45    |  pO2: 44    | Lactate:       | BE: 5.8          CSF:                      EKG:   MICROBIOLOGY:    Culture - Sputum (collected 31 Dec 2021 23:38)  Source: .Sputum Sputum  Gram Stain (01 Jan 2022 06:57):    Few polymorphonuclear leukocytes per low power field    No Squamous epithelial cells per low power field    No organisms seen per oil power field  Final Report (02 Jan 2022 17:06):    Normal Respiratory Lydia present      IMAGING:      Labs, imaging, EKG personally reviewed    RADIOLOGY & ADDITIONAL TESTS: Reviewed.     INTERVAL HPI/OVERNIGHT EVENTS: Overnight patient was overbreathing the vent, failed CPAP, needed versed gtt, and pushes of fent, prop, ativan. She became hypoglycemic and TF restarted, D5 started. CXR showed subq emphysema. Labs concerning for new leukocytosis.     SUBJECTIVE: Patient seen and examined at bedside. Intubated, sedated, concordant with the vent.     OBJECTIVE:    VITAL SIGNS:  ICU Vital Signs Last 24 Hrs  T(C): 37.9 (03 Jan 2022 04:00), Max: 38.4 (02 Jan 2022 08:00)  T(F): 100.2 (03 Jan 2022 04:00), Max: 101.2 (02 Jan 2022 08:00)  HR: 108 (03 Jan 2022 06:00) (69 - 108)  BP: --  BP(mean): --  ABP: 125/77 (03 Jan 2022 06:30) (82/45 - 130/78)  ABP(mean): 95 (03 Jan 2022 06:30) (50 - 95)  RR: 38 (03 Jan 2022 06:00) (18 - 38)  SpO2: 90% (03 Jan 2022 06:00) (90% - 100%)    Mode: AC/ CMV (Assist Control/ Continuous Mandatory Ventilation), RR (machine): 18, TV (machine): 380, FiO2: 100, PEEP: 5, MAP: 10, PIP: 21    01-02 @ 07:01  -  01-03 @ 07:00  --------------------------------------------------------  IN: 3434.8 mL / OUT: 4310 mL / NET: -875.2 mL      CAPILLARY BLOOD GLUCOSE      POCT Blood Glucose.: 206 mg/dL (03 Jan 2022 07:00)      PHYSICAL EXAM:  General: sedated, intubated, contractures  HEENT: NC/AT; PERRL, clear conjunctiva  Neck: supple, crackles palpated   Respiratory: Mechanical breath sounds, lungs clear to auscultation  Cardiovascular: +S1/S2; RRR  Abdomen: soft, NT/ND; +BS x4  Extremities: WWP, 2+ peripheral pulses b/l; no LE edema, notable muscle wasting in lower extremities  Skin: normal color and turgor; no rash  Neurological: unable to assess    MEDICATIONS:  MEDICATIONS  (STANDING):  albumin human  5% IVPB 250 milliLiter(s) IV Intermittent once  chlorhexidine 0.12% Liquid 15 milliLiter(s) Oral Mucosa every 12 hours  chlorhexidine 4% Liquid 1 Application(s) Topical <User Schedule>  dexMEDEtomidine Infusion 0.1 MICROgram(s)/kG/Hr (1.05 mL/Hr) IV Continuous <Continuous>  dextrose 40% Gel 15 Gram(s) Oral once  dextrose 5%. 1000 milliLiter(s) (50 mL/Hr) IV Continuous <Continuous>  dextrose 5%. 1000 milliLiter(s) (100 mL/Hr) IV Continuous <Continuous>  dextrose 50% Injectable 25 Gram(s) IV Push once  dextrose 50% Injectable 12.5 Gram(s) IV Push once  dextrose 50% Injectable 25 Gram(s) IV Push once  enoxaparin Injectable 40 milliGRAM(s) SubCutaneous every 24 hours  glucagon  Injectable 1 milliGRAM(s) IntraMuscular once  insulin lispro (ADMELOG) corrective regimen sliding scale   SubCutaneous every 6 hours  insulin NPH human recombinant 20 Unit(s) SubCutaneous every 6 hours  lidocaine   4% Patch 1 Patch Transdermal daily  midazolam Infusion 0.02 mG/kG/Hr (0.84 mL/Hr) IV Continuous <Continuous>  multivitamin 1 Tablet(s) Oral daily  norepinephrine Infusion 0.05 MICROgram(s)/kG/Min (3.92 mL/Hr) IV Continuous <Continuous>  petrolatum Ophthalmic Ointment 1 Application(s) Both EYES two times a day  piperacillin/tazobactam IVPB.. 3.375 Gram(s) IV Intermittent every 8 hours  polyethylene glycol 3350 17 Gram(s) Oral daily  propofol Infusion 30 MICROgram(s)/kG/Min (7.52 mL/Hr) IV Continuous <Continuous>  senna Syrup 10 milliLiter(s) Oral daily    MEDICATIONS  (PRN):  acetaminophen     Tablet .. 650 milliGRAM(s) Oral every 6 hours PRN Temp greater or equal to 38C (100.4F)  ALBUTerol    90 MICROgram(s) HFA Inhaler 2 Puff(s) Inhalation every 4 hours PRN Shortness of Breath and/or Wheezing      ALLERGIES:  Allergies    No Known Allergies    Intolerances        LABS:                        8.9    21.38 )-----------( 147      ( 03 Jan 2022 04:03 )             27.6     Hemoglobin: 8.9 g/dL (01-03 @ 04:03)  Hemoglobin: 8.3 g/dL (01-02 @ 03:11)  Hemoglobin: 8.4 g/dL (01-01 @ 05:28)  Hemoglobin: 9.2 g/dL (12-31 @ 06:55)  Hemoglobin: 9.9 g/dL (12-30 @ 02:16)    CBC Full  -  ( 03 Jan 2022 04:03 )  WBC Count : 21.38 K/uL  RBC Count : 3.12 M/uL  Hemoglobin : 8.9 g/dL  Hematocrit : 27.6 %  Platelet Count - Automated : 147 K/uL  Mean Cell Volume : 88.5 fL  Mean Cell Hemoglobin : 28.5 pg  Mean Cell Hemoglobin Concentration : 32.2 gm/dL  Auto Neutrophil # : x  Auto Lymphocyte # : x  Auto Monocyte # : x  Auto Eosinophil # : x  Auto Basophil # : x  Auto Neutrophil % : x  Auto Lymphocyte % : x  Auto Monocyte % : x  Auto Eosinophil % : x  Auto Basophil % : x    01-03    150<H>  |  113<H>  |  30<H>  ----------------------------<  179<H>  4.3   |  26  |  0.67    Ca    8.5      03 Jan 2022 04:03  Phos  5.0     01-03  Mg     2.40     01-03    TPro  5.8<L>  /  Alb  2.3<L>  /  TBili  0.7  /  DBili  x   /  AST  204<H>  /  ALT  158<H>  /  AlkPhos  115  01-03    Creatinine Trend: 0.67<--, 0.69<--, 0.67<--, 0.60<--, 0.60<--, 0.72<--  LIVER FUNCTIONS - ( 03 Jan 2022 04:03 )  Alb: 2.3 g/dL / Pro: 5.8 g/dL / ALK PHOS: 115 U/L / ALT: 158 U/L / AST: 204 U/L / GGT: x           PT/INR - ( 02 Jan 2022 06:31 )   PT: 11.5 sec;   INR: 1.00 ratio             hs Troponin:    ABG - ( 03 Jan 2022 06:15 )  pH, Arterial: 7.49  pH, Blood: x     /  pCO2: 36    /  pO2: 81    / HCO3: 27    / Base Excess: 3.9   /  SaO2: 95.8                06:15 - ABG - pH: 7.49  |  pCO2: 36    |  pO2: 81    | Lactate:       | BE: 3.9    04:03 - ABG - pH: 7.45  |  pCO2: 43    |  pO2: 108   | Lactate:       | BE: 5.4    22:41 - ABG - pH: 7.50  |  pCO2: 39    |  pO2: 101   | Lactate:       | BE: 6.7    16:36 - ABG - pH: 7.44  |  pCO2: 45    |  pO2: 44    | Lactate:       | BE: 5.8          CSF:                      EKG:   MICROBIOLOGY:    Culture - Sputum (collected 31 Dec 2021 23:38)  Source: .Sputum Sputum  Gram Stain (01 Jan 2022 06:57):    Few polymorphonuclear leukocytes per low power field    No Squamous epithelial cells per low power field    No organisms seen per oil power field  Final Report (02 Jan 2022 17:06):    Normal Respiratory Lydia present      IMAGING:      Labs, imaging, EKG personally reviewed    RADIOLOGY & ADDITIONAL TESTS: Reviewed.

## 2022-01-03 NOTE — PROCEDURE NOTE - NSPROCNAME_GEN_A_CORE
Peripheral Line Insertion
Peripheral Line Insertion
Arterial Puncture/Cannulation
Arterial Puncture/Cannulation

## 2022-01-04 NOTE — CHART NOTE - NSCHARTNOTEFT_GEN_A_CORE
: Titi Antonio    INDICATION:  Shock and respiratory failure    PROCEDURE:  [x ] LIMITED ECHO  [x ] LIMITED CHEST  [ ] LIMITED RETROPERITONEAL  [ ] LIMITED ABDOMINAL  [ ] LIMITED DVT  [ ] NEEDLE GUIDANCE VASCULAR  [ ] NEEDLE GUIDANCE THORACENTESIS  [ ] NEEDLE GUIDANCE PARACENTESIS  [ ] NEEDLE GUIDANCE PERICARDIOCENTESIS  [ ] OTHER    FINDINGS:    Lungs: B line predominant pattern anteriorly; Small b/l pleural effusions; small bibasilar consolidation pattern.  Heart: Grossly normal LVSF     INTERPRETATION:    Small b/l pleural effusions with associated consolidations  Grossly normal LVSF     Images uploaded on [employer] : Titi Antonio    INDICATION:  Shock and respiratory failure    PROCEDURE:  [x ] LIMITED ECHO  [x ] LIMITED CHEST  [ ] LIMITED RETROPERITONEAL  [ ] LIMITED ABDOMINAL  [ ] LIMITED DVT  [ ] NEEDLE GUIDANCE VASCULAR  [ ] NEEDLE GUIDANCE THORACENTESIS  [ ] NEEDLE GUIDANCE PARACENTESIS  [ ] NEEDLE GUIDANCE PERICARDIOCENTESIS  [ ] OTHER    FINDINGS:    Lungs: B line predominant pattern anteriorly; Small b/l pleural effusions; small bibasilar consolidation pattern.  Heart: Grossly normal LVSF     INTERPRETATION:    Small b/l pleural effusions with associated consolidations  Grossly normal LVSF     Images uploaded on [email or web address]    I was present during the key portions of the procedure and immediately available during the entire procedure.    Suman KLINE

## 2022-01-04 NOTE — PROGRESS NOTE ADULT - SUBJECTIVE AND OBJECTIVE BOX
INTERVAL HPI/OVERNIGHT EVENTS: Hypogylcemic to 40s    SUBJECTIVE: Patient seen and examined at bedside.     OBJECTIVE:    VITAL SIGNS:  ICU Vital Signs Last 24 Hrs  T(C): 38.3 (2022 04:00), Max: 39 (2022 20:00)  T(F): 101 (2022 04:00), Max: 102.2 (2022 20:00)  HR: 113 (2022 06:05) (77 - 117)  BP: 112/68 (2022 21:00) (97/57 - 112/68)  BP(mean): 77 (2022 21:00) (65 - 77)  ABP: 100/60 (2022 06:05) (75/45 - 142/72)  ABP(mean): 72 (2022 06:05) (50 - 96)  RR: 18 (2022 06:05) (18 - 24)  SpO2: 96% (2022 06:05) (92% - 100%)    Mode: AC/ CMV (Assist Control/ Continuous Mandatory Ventilation), RR (machine): 18, TV (machine): 280, FiO2: 50, PEEP: 5, ITime: 0.74, MAP: 11, PIP: 37    01-03 @ 07:01  -  01-04 @ 07:00  --------------------------------------------------------  IN: 3599.9 mL / OUT: 3530 mL / NET: 69.9 mL      CAPILLARY BLOOD GLUCOSE      POCT Blood Glucose.: 194 mg/dL (2022 05:28)      PHYSICAL EXAM:    General: NAD  HEENT: NC/AT; PERRL, clear conjunctiva  Neck: supple  Respiratory: CTA b/l  Cardiovascular: +S1/S2; RRR  Abdomen: soft, NT/ND; +BS x4  Extremities: WWP, 2+ peripheral pulses b/l; no LE edema  Skin: normal color and turgor; no rash  Neurological:    MEDICATIONS:  MEDICATIONS  (STANDING):  chlorhexidine 0.12% Liquid 15 milliLiter(s) Oral Mucosa every 12 hours  chlorhexidine 4% Liquid 1 Application(s) Topical <User Schedule>  dexMEDEtomidine Infusion 0.1 MICROgram(s)/kG/Hr (1.05 mL/Hr) IV Continuous <Continuous>  dextrose 40% Gel 15 Gram(s) Oral once  dextrose 5%. 1000 milliLiter(s) (50 mL/Hr) IV Continuous <Continuous>  dextrose 5%. 1000 milliLiter(s) (100 mL/Hr) IV Continuous <Continuous>  dextrose 50% Injectable 25 Gram(s) IV Push once  dextrose 50% Injectable 12.5 Gram(s) IV Push once  dextrose 50% Injectable 25 Gram(s) IV Push once  enoxaparin Injectable 40 milliGRAM(s) SubCutaneous every 24 hours  glucagon  Injectable 1 milliGRAM(s) IntraMuscular once  insulin lispro (ADMELOG) corrective regimen sliding scale   SubCutaneous every 6 hours  lidocaine   4% Patch 1 Patch Transdermal daily  meropenem  IVPB 1000 milliGRAM(s) IV Intermittent every 12 hours  midazolam Infusion 0.02 mG/kG/Hr (0.84 mL/Hr) IV Continuous <Continuous>  multivitamin 1 Tablet(s) Oral daily  norepinephrine Infusion 0.05 MICROgram(s)/kG/Min (3.92 mL/Hr) IV Continuous <Continuous>  petrolatum Ophthalmic Ointment 1 Application(s) Both EYES two times a day  polyethylene glycol 3350 17 Gram(s) Oral daily  propofol Infusion 30 MICROgram(s)/kG/Min (7.52 mL/Hr) IV Continuous <Continuous>  senna Syrup 10 milliLiter(s) Oral daily    MEDICATIONS  (PRN):  acetaminophen     Tablet .. 650 milliGRAM(s) Oral every 6 hours PRN Temp greater or equal to 38C (100.4F)  ALBUTerol    90 MICROgram(s) HFA Inhaler 2 Puff(s) Inhalation every 4 hours PRN Shortness of Breath and/or Wheezing      ALLERGIES:  Allergies    No Known Allergies    Intolerances        LABS:                        7.5    20.90 )-----------( 152      ( 2022 03:30 )             23.7     Hemoglobin: 7.5 g/dL ( @ 03:30)  Hemoglobin: 8.9 g/dL ( @ 04:03)  Hemoglobin: 8.3 g/dL ( @ 03:11)  Hemoglobin: 8.4 g/dL ( @ 05:28)  Hemoglobin: 9.2 g/dL (12 @ 06:55)    CBC Full  -  ( 2022 03:30 )  WBC Count : 20.90 K/uL  RBC Count : 2.65 M/uL  Hemoglobin : 7.5 g/dL  Hematocrit : 23.7 %  Platelet Count - Automated : 152 K/uL  Mean Cell Volume : 89.4 fL  Mean Cell Hemoglobin : 28.3 pg  Mean Cell Hemoglobin Concentration : 31.6 gm/dL  Auto Neutrophil # : x  Auto Lymphocyte # : x  Auto Monocyte # : x  Auto Eosinophil # : x  Auto Basophil # : x  Auto Neutrophil % : x  Auto Lymphocyte % : x  Auto Monocyte % : x  Auto Eosinophil % : x  Auto Basophil % : x        156<H>  |  116<H>  |  27<H>  ----------------------------<  81  4.3   |  32<H>  |  0.61    Ca    8.5      2022 03:30  Phos  3.2     -04  Mg     2.70     -    TPro  5.8<L>  /  Alb  2.9<L>  /  TBili  0.4  /  DBili  x   /  AST  145<H>  /  ALT  156<H>  /  AlkPhos  92  -04    Creatinine Trend: 0.61<--, 0.67<--, 0.69<--, 0.67<--, 0.60<--, 0.60<--  LIVER FUNCTIONS - ( 2022 03:30 )  Alb: 2.9 g/dL / Pro: 5.8 g/dL / ALK PHOS: 92 U/L / ALT: 156 U/L / AST: 145 U/L / GGT: x           PT/INR - ( 2022 03:30 )   PT: 11.2 sec;   INR: 0.97 ratio             hs Troponin:    ABG - ( 2022 05:15 )  pH, Arterial: 7.49  pH, Blood: x     /  pCO2: 45    /  pO2: 121   / HCO3: 34    / Base Excess: 10.0  /  SaO2: 97.6                05:15 - ABG - pH: 7.49  |  pCO2: 45    |  pO2: 121   | Lactate:       | BE: 10.0       Urinalysis Basic - ( 2022 17:19 )    Color: Light Yellow / Appearance: Clear / S.014 / pH: x  Gluc: x / Ketone: Negative  / Bili: Negative / Urobili: <2 mg/dL   Blood: x / Protein: Trace / Nitrite: Negative   Leuk Esterase: Negative / RBC: 5-10 /HPF / WBC 0-2 /HPF   Sq Epi: x / Non Sq Epi: small /HPF / Bacteria: Few      CSF:                      EKG:   MICROBIOLOGY:    Culture - Sputum (collected 2022 00:43)  Source: .Sputum Sputum  Gram Stain (2022 02:51):    Few polymorphonuclear leukocytes per low power field    Few Squamous epithelial cells per low power field    Few Yeast like cells seen per oil power field    Few Gram Negative Rods seen per oil power field      IMAGING:      Labs, imaging, EKG personally reviewed    RADIOLOGY & ADDITIONAL TESTS: Reviewed.     INTERVAL HPI/OVERNIGHT EVENTS: Hypogylcemic to 40s    SUBJECTIVE: Patient seen and examined at bedside. Intubated, sedated, will try to wean versed, however goal is to stay concordant with the vent.     OBJECTIVE:    VITAL SIGNS:  ICU Vital Signs Last 24 Hrs  T(C): 38.3 (2022 04:00), Max: 39 (2022 20:00)  T(F): 101 (2022 04:00), Max: 102.2 (2022 20:00)  HR: 113 (2022 06:05) (77 - 117)  BP: 112/68 (2022 21:00) (97/57 - 112/68)  BP(mean): 77 (2022 21:00) (65 - 77)  ABP: 100/60 (2022 06:05) (75/45 - 142/72)  ABP(mean): 72 (2022 06:05) (50 - 96)  RR: 18 (2022 06:05) (18 - 24)  SpO2: 96% (2022 06:05) (92% - 100%)    Mode: AC/ CMV (Assist Control/ Continuous Mandatory Ventilation), RR (machine): 18, TV (machine): 280, FiO2: 50, PEEP: 5, ITime: 0.74, MAP: 11, PIP: 37    -03 @ 07:01  -  01-04 @ 07:00  --------------------------------------------------------  IN: 3599.9 mL / OUT: 3530 mL / NET: 69.9 mL      CAPILLARY BLOOD GLUCOSE      POCT Blood Glucose.: 194 mg/dL (2022 05:28)      PHYSICAL EXAM:  General: sedated, intubated, contractures  HEENT: NC/AT; PERRL, clear conjunctiva  Neck: supple, crackles palpated   Respiratory: Mechanical breath sounds, lungs clear to auscultation  Cardiovascular: +S1/S2; RRR  Abdomen: soft, NT/ND; +BS x4  Extremities: WWP, 2+ peripheral pulses b/l; no LE edema, notable muscle wasting in lower extremities  Skin: normal color and turgor; no rash  Neurological: unable to assess      MEDICATIONS:  MEDICATIONS  (STANDING):  chlorhexidine 0.12% Liquid 15 milliLiter(s) Oral Mucosa every 12 hours  chlorhexidine 4% Liquid 1 Application(s) Topical <User Schedule>  dexMEDEtomidine Infusion 0.1 MICROgram(s)/kG/Hr (1.05 mL/Hr) IV Continuous <Continuous>  dextrose 40% Gel 15 Gram(s) Oral once  dextrose 5%. 1000 milliLiter(s) (50 mL/Hr) IV Continuous <Continuous>  dextrose 5%. 1000 milliLiter(s) (100 mL/Hr) IV Continuous <Continuous>  dextrose 50% Injectable 25 Gram(s) IV Push once  dextrose 50% Injectable 12.5 Gram(s) IV Push once  dextrose 50% Injectable 25 Gram(s) IV Push once  enoxaparin Injectable 40 milliGRAM(s) SubCutaneous every 24 hours  glucagon  Injectable 1 milliGRAM(s) IntraMuscular once  insulin lispro (ADMELOG) corrective regimen sliding scale   SubCutaneous every 6 hours  lidocaine   4% Patch 1 Patch Transdermal daily  meropenem  IVPB 1000 milliGRAM(s) IV Intermittent every 12 hours  midazolam Infusion 0.02 mG/kG/Hr (0.84 mL/Hr) IV Continuous <Continuous>  multivitamin 1 Tablet(s) Oral daily  norepinephrine Infusion 0.05 MICROgram(s)/kG/Min (3.92 mL/Hr) IV Continuous <Continuous>  petrolatum Ophthalmic Ointment 1 Application(s) Both EYES two times a day  polyethylene glycol 3350 17 Gram(s) Oral daily  propofol Infusion 30 MICROgram(s)/kG/Min (7.52 mL/Hr) IV Continuous <Continuous>  senna Syrup 10 milliLiter(s) Oral daily    MEDICATIONS  (PRN):  acetaminophen     Tablet .. 650 milliGRAM(s) Oral every 6 hours PRN Temp greater or equal to 38C (100.4F)  ALBUTerol    90 MICROgram(s) HFA Inhaler 2 Puff(s) Inhalation every 4 hours PRN Shortness of Breath and/or Wheezing      ALLERGIES:  Allergies    No Known Allergies    Intolerances        LABS:                        7.5    20.90 )-----------( 152      ( 2022 03:30 )             23.7     Hemoglobin: 7.5 g/dL ( @ 03:30)  Hemoglobin: 8.9 g/dL ( @ 04:03)  Hemoglobin: 8.3 g/dL ( @ 03:11)  Hemoglobin: 8.4 g/dL ( @ 05:28)  Hemoglobin: 9.2 g/dL ( @ 06:55)    CBC Full  -  ( 2022 03:30 )  WBC Count : 20.90 K/uL  RBC Count : 2.65 M/uL  Hemoglobin : 7.5 g/dL  Hematocrit : 23.7 %  Platelet Count - Automated : 152 K/uL  Mean Cell Volume : 89.4 fL  Mean Cell Hemoglobin : 28.3 pg  Mean Cell Hemoglobin Concentration : 31.6 gm/dL  Auto Neutrophil # : x  Auto Lymphocyte # : x  Auto Monocyte # : x  Auto Eosinophil # : x  Auto Basophil # : x  Auto Neutrophil % : x  Auto Lymphocyte % : x  Auto Monocyte % : x  Auto Eosinophil % : x  Auto Basophil % : x        156<H>  |  116<H>  |  27<H>  ----------------------------<  81  4.3   |  32<H>  |  0.61    Ca    8.5      2022 03:30  Phos  3.2       Mg     2.70         TPro  5.8<L>  /  Alb  2.9<L>  /  TBili  0.4  /  DBili  x   /  AST  145<H>  /  ALT  156<H>  /  AlkPhos  92  04    Creatinine Trend: 0.61<--, 0.67<--, 0.69<--, 0.67<--, 0.60<--, 0.60<--  LIVER FUNCTIONS - ( 2022 03:30 )  Alb: 2.9 g/dL / Pro: 5.8 g/dL / ALK PHOS: 92 U/L / ALT: 156 U/L / AST: 145 U/L / GGT: x           PT/INR - ( 2022 03:30 )   PT: 11.2 sec;   INR: 0.97 ratio             hs Troponin:    ABG - ( 2022 05:15 )  pH, Arterial: 7.49  pH, Blood: x     /  pCO2: 45    /  pO2: 121   / HCO3: 34    / Base Excess: 10.0  /  SaO2: 97.6                05:15 - ABG - pH: 7.49  |  pCO2: 45    |  pO2: 121   | Lactate:       | BE: 10.0       Urinalysis Basic - ( 2022 17:19 )    Color: Light Yellow / Appearance: Clear / S.014 / pH: x  Gluc: x / Ketone: Negative  / Bili: Negative / Urobili: <2 mg/dL   Blood: x / Protein: Trace / Nitrite: Negative   Leuk Esterase: Negative / RBC: 5-10 /HPF / WBC 0-2 /HPF   Sq Epi: x / Non Sq Epi: small /HPF / Bacteria: Few      CSF:      EKG:   MICROBIOLOGY:    Culture - Sputum (collected 2022 00:43)  Source: .Sputum Sputum  Gram Stain (2022 02:51):    Few polymorphonuclear leukocytes per low power field    Few Squamous epithelial cells per low power field    Few Yeast like cells seen per oil power field    Few Gram Negative Rods seen per oil power field      IMAGING:      Labs, imaging, EKG personally reviewed    RADIOLOGY & ADDITIONAL TESTS: Reviewed.

## 2022-01-04 NOTE — PROVIDER CONTACT NOTE (HYPOGLYCEMIA EVENT) - NS PROVIDER CONTACT BACKGROUND-HYPO
Age: 61y    Gender: Female    POCT Blood Glucose:  42 mg/dL (01-04-22 @ 05:03)  47 mg/dL (01-04-22 @ 05:02)  125 mg/dL (01-03-22 @ 22:53)  259 mg/dL (01-03-22 @ 18:01)  188 mg/dL (01-03-22 @ 11:40)  206 mg/dL (01-03-22 @ 07:00)  175 mg/dL (01-03-22 @ 05:42)  209 mg/dL (01-03-22 @ 05:40)      eMAR:  dexAMETHasone  Injectable   6 milliGRAM(s) IV Push (01-03-22 @ 05:29)    insulin lispro (ADMELOG) corrective regimen sliding scale   6 Unit(s) SubCutaneous (01-03-22 @ 18:04)   2 Unit(s) SubCutaneous (01-03-22 @ 11:47)   4 Unit(s) SubCutaneous (01-03-22 @ 07:03)    insulin NPH human recombinant   20 Unit(s) SubCutaneous (01-03-22 @ 18:03)   20 Unit(s) SubCutaneous (01-03-22 @ 11:49)    insulin NPH human recombinant   15 Unit(s) SubCutaneous (01-03-22 @ 23:42)

## 2022-01-04 NOTE — PROGRESS NOTE ADULT - ASSESSMENT
60 y/o F, PMH of Polio (bedbound at baseline w/ chronic contractures), HTN, HLD, Type 2 DM (unknown if on insulin), presented to ED w/ c/o left hip pain and dry cough x 1 week, found to be Covid+ and have L hip dislocation for which ortho was consulted for which it was determined she needed no acute surgical intervention. Course complicated by persistent hypoxia in the setting of multifocal pneumonia 2/2 COVID and/or aspiration.     Neuro:  Baseline nonverbal but responds to commands. Currently intubated and sedated.   -failed CPAP overnight, resedated on precedex, versed    Respiratory:   #Acute hypoxic respiratory failure-   -P/F 47 pre-intubation. Severe ARDS.    - s/p intubation on 12/27  - Overbreathing vent overnight, CXR showed subq emphysema, concern for barotrauma from dysynchrony from vent, will hold off on further cpap trials for now   -COVID +, was being treated with dexamethasone and remdesivir. Last day of decadron on 1/3, remdesivir discontinued upon arrival to MICU  -Empirically tx for CAP abx. Previously on azithro and CTX but urine legionella negative and pt febrile so coverage broadened to zosyn on 12/30.Continue Zosyn for 5 days, anticipate end on 1/3. Patient still febrile, will start meropenem pending sputum/blood/UA  -Previous CT negative for PE, previously on lovenox BID however after discussion with pharmacy normal ppx dose is appropriate  -Low threshold for stat CXR if pressure drops as patient at risk for developing PTX    Cardiac:   -Levo restarted given patient resedated  #HTN  #Hyperlipidemia-holding statin      GI:  Tube feeds  Rectal pouch in place  -Worsening transaminitis, RUQ showed fatty infiltration, suspect Covid hepatitis, however will continue to trend    /Renal:  #Hypernatremia- stable at 150, will continue free water 400 q8h.    #T2DM- Humulin decreased to 15, pt now off decadron, will monitor sugars and decrease insulin as needed    MSK:  #Polio- Chronic contractures. No intervention at this time. Follow-up nutrition recommendations.   #Chronic Hip dislocations- Ortho following. No acute surgical intervention needed. Will ctm.     ID:  - urine legionella neg, Azithro dc'd on 12/29  -CTX broadened to zosyn on 12/30 as pt febrile. Anticipate end date 1/3. However patient still febrile on 1/3, will start meropenem  -blood cx NGTD, repeat cultures pending  -urine cultures growing GBS  -Sputum culture 1/1 NGTD, repeat sent  -UA on 1/3 neg    Prophylaxis:  #Lovenox ppx    GOC:   Appreciate palliative care, pt DNR. BRANDEE signed and in chart   62 y/o F, PMH of Polio (bedbound at baseline w/ chronic contractures), HTN, HLD, Type 2 DM (unknown if on insulin), presented to ED w/ c/o left hip pain and dry cough x 1 week, found to be Covid+ and have L hip dislocation for which ortho was consulted for which it was determined she needed no acute surgical intervention. Course complicated by persistent hypoxia in the setting of multifocal pneumonia 2/2 COVID and/or aspiration.     Neuro:  Baseline nonverbal but responds to commands. Currently intubated and sedated.   -continue with sedation w/ precedex, prop, will trial weaning versed as long as patient comfortable and concordant with vent    Respiratory:   #Acute hypoxic respiratory failure-   -P/F 47 pre-intubation. Severe ARDS.    - s/p intubation on 12/27  - CXR on 1/3 showed subq emphysema, concern for barotrauma from dysynchrony from vent, will hold off on further cpap trials for now   -COVID +, was being treated with dexamethasone and remdesivir. Last day of decadron on 1/3, remdesivir discontinued upon arrival to MICU  -Empirically tx for CAP abx. Previously on azithro and CTX but urine legionella negative and pt febrile so coverage broadened to zosyn on 12/30-1/3, however pt persistently febrile and started on meropenem 1/3 pending cultures  -Previous CT negative for PE, previously on lovenox BID however after discussion with pharmacy normal ppx dose is appropriate  -Low threshold for stat CXR if pressure drops as patient at risk for developing PTX    Cardiac:   -on Levo titrated to MAP 65  #HTN  #Hyperlipidemia-holding statin      GI:  Tube feeds  Rectal pouch in place  -New transaminitis, RUQ showed fatty infiltration, suspect Covid hepatitis, however will continue to trend, now downtrending    /Renal:  #Hypernatremia- increasing to 156, increased free water back to 400q4h.   #T2DM- Pt hypoglycemic to the 40s overnight, humulin discontinued, continue w/ sliding scale. Initial hyperglycemia likely 2/2 decadron    MSK:  #Polio- Chronic contractures. No intervention at this time. Follow-up nutrition recommendations.   #Chronic Hip dislocations- Ortho following. No acute surgical intervention needed. Will ctm.     ID:  - urine legionella neg, Azithro dc'd on 12/29  -CTX broadened to zosyn on 12/30-1/3. However patient still febrile on 1/3, will start meropenem  -blood cx NGTD, repeat cultures pending  -urine cultures growing GBS  -Sputum culture 1/1 NGTD, repeat sent growing yeast and GNR  -UA on 1/3 neg    Prophylaxis:  #Lovenox ppx    GOC:   Appreciate palliative care, pt DNR. BRANDEE signed and in chart

## 2022-01-04 NOTE — PROVIDER CONTACT NOTE (HYPOGLYCEMIA EVENT) - NS PROVIDER CONTACT SITUATION-HYPO
Patient intubated and sedated. Patient on Glucerna 1.5 tube feed at goal of 35 ml/hr. 
hypoglycemia episode. tube feed restarted at 35mL. MICU team aware

## 2022-01-04 NOTE — PROGRESS NOTE ADULT - ATTENDING COMMENTS
Patient examined and case reviewed in detail on bedside rounds  Critically ill and unstable on vent/pressors with COVID PNA   Frequent bedside visits with therapy change today.   I have personally provided 35+ minutes of critical care time concurrently with the resident/fellow; this excludes time spent on separate procedures.

## 2022-01-05 NOTE — CHART NOTE - NSCHARTNOTEFT_GEN_A_CORE
: Titi Antonio    INDICATION:  Shock and respiratory failure    PROCEDURE:  [ ] LIMITED ECHO  [x ] LIMITED CHEST  [ ] LIMITED RETROPERITONEAL  [ ] LIMITED ABDOMINAL  [ ] LIMITED DVT  [ ] NEEDLE GUIDANCE VASCULAR  [ ] NEEDLE GUIDANCE THORACENTESIS  [ ] NEEDLE GUIDANCE PARACENTESIS  [ ] NEEDLE GUIDANCE PERICARDIOCENTESIS  [ ] OTHER    FINDINGS:    Lungs: significant B line predominance anteriorly and posteriorly; small bibasilar consolidation pattern.    INTERPRETATION:    B lines bilaterally consistent with covid pna  Small bibasilar consolidations    Images uploaded on [employer] : Titi Antonio    INDICATION:  Shock and respiratory failure    PROCEDURE:  [ ] LIMITED ECHO  [x ] LIMITED CHEST  [ ] LIMITED RETROPERITONEAL  [ ] LIMITED ABDOMINAL  [ ] LIMITED DVT  [ ] NEEDLE GUIDANCE VASCULAR  [ ] NEEDLE GUIDANCE THORACENTESIS  [ ] NEEDLE GUIDANCE PARACENTESIS  [ ] NEEDLE GUIDANCE PERICARDIOCENTESIS  [ ] OTHER    FINDINGS:    Lungs: significant B line predominance anteriorly and posteriorly; small bibasilar consolidation pattern.    INTERPRETATION:    B lines bilaterally consistent with covid pna  Small bibasilar consolidations    Images uploaded on [employer]    I was present during the key portions of the procedure and immediately available during the entire procedure.  Suman KLINE

## 2022-01-05 NOTE — PROGRESS NOTE ADULT - SUBJECTIVE AND OBJECTIVE BOX
INTERVAL HPI/OVERNIGHT EVENTS: No acute events overnight    SUBJECTIVE: Patient seen and examined at bedside.     OBJECTIVE:    VITAL SIGNS:  ICU Vital Signs Last 24 Hrs  T(C): 38.8 (2022 04:00), Max: 38.8 (2022 04:00)  T(F): 101.8 (2022 04:00), Max: 101.8 (2022 04:00)  HR: 90 (2022 07:00) (73 - 95)  BP: --  BP(mean): --  ABP: 94/53 (2022 07:00) (86/51 - 120/66)  ABP(mean): 65 (2022 07:00) (62 - 83)  RR: 25 (2022 07:00) (22 - 28)  SpO2: 92% (2022 07:00) (91% - 97%)    Mode: AC/ CMV (Assist Control/ Continuous Mandatory Ventilation), RR (machine): 18, TV (machine): 280, FiO2: 50, PEEP: 5, ITime: 0.56, MAP: 13, PIP: 39    -04 @ 07:01  -  01-05 @ 07:00  --------------------------------------------------------  IN: 3986.7 mL / OUT: 2105 mL / NET: 1881.7 mL      CAPILLARY BLOOD GLUCOSE      POCT Blood Glucose.: 169 mg/dL (2022 05:16)      PHYSICAL EXAM:    General: NAD  HEENT: NC/AT; PERRL, clear conjunctiva  Neck: supple  Respiratory: CTA b/l  Cardiovascular: +S1/S2; RRR  Abdomen: soft, NT/ND; +BS x4  Extremities: WWP, 2+ peripheral pulses b/l; no LE edema  Skin: normal color and turgor; no rash  Neurological:    MEDICATIONS:  MEDICATIONS  (STANDING):  chlorhexidine 0.12% Liquid 15 milliLiter(s) Oral Mucosa every 12 hours  chlorhexidine 4% Liquid 1 Application(s) Topical <User Schedule>  dexMEDEtomidine Infusion 0.1 MICROgram(s)/kG/Hr (1.05 mL/Hr) IV Continuous <Continuous>  dextrose 40% Gel 15 Gram(s) Oral once  dextrose 5%. 1000 milliLiter(s) (50 mL/Hr) IV Continuous <Continuous>  dextrose 5%. 1000 milliLiter(s) (100 mL/Hr) IV Continuous <Continuous>  dextrose 50% Injectable 25 Gram(s) IV Push once  dextrose 50% Injectable 12.5 Gram(s) IV Push once  dextrose 50% Injectable 25 Gram(s) IV Push once  enoxaparin Injectable 40 milliGRAM(s) SubCutaneous every 24 hours  glucagon  Injectable 1 milliGRAM(s) IntraMuscular once  insulin lispro (ADMELOG) corrective regimen sliding scale   SubCutaneous every 6 hours  lidocaine   4% Patch 1 Patch Transdermal daily  meropenem  IVPB 1000 milliGRAM(s) IV Intermittent every 12 hours  midazolam Infusion 0.02 mG/kG/Hr (0.84 mL/Hr) IV Continuous <Continuous>  multivitamin 1 Tablet(s) Oral daily  norepinephrine Infusion 0.05 MICROgram(s)/kG/Min (3.92 mL/Hr) IV Continuous <Continuous>  petrolatum Ophthalmic Ointment 1 Application(s) Both EYES two times a day  polyethylene glycol 3350 17 Gram(s) Oral daily  propofol Infusion 30 MICROgram(s)/kG/Min (7.52 mL/Hr) IV Continuous <Continuous>  senna Syrup 10 milliLiter(s) Oral daily    MEDICATIONS  (PRN):  acetaminophen     Tablet .. 650 milliGRAM(s) Oral every 6 hours PRN Temp greater or equal to 38C (100.4F)  ALBUTerol    90 MICROgram(s) HFA Inhaler 2 Puff(s) Inhalation every 4 hours PRN Shortness of Breath and/or Wheezing      ALLERGIES:  Allergies    No Known Allergies    Intolerances        LABS:                        7.7    22.25 )-----------( 175      ( 2022 03:02 )             24.4     Hemoglobin: 7.7 g/dL ( @ 03:02)  Hemoglobin: 7.5 g/dL ( @ 03:30)  Hemoglobin: 8.9 g/dL ( @ 04:03)  Hemoglobin: 8.3 g/dL ( @ 03:11)  Hemoglobin: 8.4 g/dL ( @ 05:28)    CBC Full  -  ( 2022 03:02 )  WBC Count : 22.25 K/uL  RBC Count : 2.73 M/uL  Hemoglobin : 7.7 g/dL  Hematocrit : 24.4 %  Platelet Count - Automated : 175 K/uL  Mean Cell Volume : 89.4 fL  Mean Cell Hemoglobin : 28.2 pg  Mean Cell Hemoglobin Concentration : 31.6 gm/dL  Auto Neutrophil # : x  Auto Lymphocyte # : x  Auto Monocyte # : x  Auto Eosinophil # : x  Auto Basophil # : x  Auto Neutrophil % : x  Auto Lymphocyte % : x  Auto Monocyte % : x  Auto Eosinophil % : x  Auto Basophil % : x        145  |  108<H>  |  27<H>  ----------------------------<  180<H>  4.0   |  27  |  0.49<L>    Ca    9.0      2022 03:02  Phos  2.6       Mg     2.10         TPro  5.4<L>  /  Alb  2.3<L>  /  TBili  0.4  /  DBili  x   /  AST  63<H>  /  ALT  96<H>  /  AlkPhos  92      Creatinine Trend: 0.49<--, 0.61<--, 0.67<--, 0.69<--, 0.67<--, 0.60<--  LIVER FUNCTIONS - ( 2022 03:02 )  Alb: 2.3 g/dL / Pro: 5.4 g/dL / ALK PHOS: 92 U/L / ALT: 96 U/L / AST: 63 U/L / GGT: x           PT/INR - ( 2022 03:30 )   PT: 11.2 sec;   INR: 0.97 ratio             hs Troponin:    ABG - ( 2022 03:02 )  pH, Arterial: 7.51  pH, Blood: x     /  pCO2: 40    /  pO2: 113   / HCO3: 32    / Base Excess: 8.2   /  SaO2: 97.0                03:02 - ABG - pH: 7.51  |  pCO2: 40    |  pO2: 113   | Lactate:       | BE: 8.2        Urinalysis Basic - ( 2022 17:19 )    Color: Light Yellow / Appearance: Clear / S.014 / pH: x  Gluc: x / Ketone: Negative  / Bili: Negative / Urobili: <2 mg/dL   Blood: x / Protein: Trace / Nitrite: Negative   Leuk Esterase: Negative / RBC: 5-10 /HPF / WBC 0-2 /HPF   Sq Epi: x / Non Sq Epi: small /HPF / Bacteria: Few      CSF:                      EKG:   MICROBIOLOGY:    Culture - Sputum (collected 2022 00:43)  Source: .Sputum Sputum  Gram Stain (2022 02:51):    Few polymorphonuclear leukocytes per low power field    Few Squamous epithelial cells per low power field    Few Yeast like cells seen per oil power field    Few Gram Negative Rods seen per oil power field  Preliminary Report (2022 18:50):    Normal Respiratory Lydia present    Culture - Blood (collected 2022 18:24)  Source: .Blood Blood  Preliminary Report (2022 19:02):    No growth to date.    Culture - Blood (collected 2022 18:23)  Source: .Blood Blood  Preliminary Report (2022 19:02):    No growth to date.      IMAGING:      Labs, imaging, EKG personally reviewed    RADIOLOGY & ADDITIONAL TESTS: Reviewed.     INTERVAL HPI/OVERNIGHT EVENTS: No acute events overnight. Free water decreased    SUBJECTIVE: Patient seen and examined at bedside. Attempt to wean versed was made however patient became tachypneic and required fent to resedate the patient.     OBJECTIVE:    VITAL SIGNS:  ICU Vital Signs Last 24 Hrs  T(C): 38.8 (2022 04:00), Max: 38.8 (2022 04:00)  T(F): 101.8 (2022 04:00), Max: 101.8 (2022 04:00)  HR: 90 (2022 07:00) (73 - 95)  BP: --  BP(mean): --  ABP: 94/53 (2022 07:00) (86/51 - 120/66)  ABP(mean): 65 (2022 07:00) (62 - 83)  RR: 25 (2022 07:00) (22 - 28)  SpO2: 92% (2022 07:00) (91% - 97%)    Mode: AC/ CMV (Assist Control/ Continuous Mandatory Ventilation), RR (machine): 18, TV (machine): 280, FiO2: 50, PEEP: 5, ITime: 0.56, MAP: 13, PIP: 39    01-04 @ 07:01  -  01-05 @ 07:00  --------------------------------------------------------  IN: 3986.7 mL / OUT: 2105 mL / NET: 1881.7 mL      CAPILLARY BLOOD GLUCOSE      POCT Blood Glucose.: 169 mg/dL (2022 05:16)      PHYSICAL EXAM:    General: NAD  HEENT: NC/AT; PERRL, clear conjunctiva  Neck: supple  Respiratory: CTA b/l  Cardiovascular: +S1/S2; RRR  Abdomen: soft, NT/ND; +BS x4  Extremities: WWP, 2+ peripheral pulses b/l; no LE edema  Skin: normal color and turgor; no rash  Neurological:    MEDICATIONS:  MEDICATIONS  (STANDING):  chlorhexidine 0.12% Liquid 15 milliLiter(s) Oral Mucosa every 12 hours  chlorhexidine 4% Liquid 1 Application(s) Topical <User Schedule>  dexMEDEtomidine Infusion 0.1 MICROgram(s)/kG/Hr (1.05 mL/Hr) IV Continuous <Continuous>  dextrose 40% Gel 15 Gram(s) Oral once  dextrose 5%. 1000 milliLiter(s) (50 mL/Hr) IV Continuous <Continuous>  dextrose 5%. 1000 milliLiter(s) (100 mL/Hr) IV Continuous <Continuous>  dextrose 50% Injectable 25 Gram(s) IV Push once  dextrose 50% Injectable 12.5 Gram(s) IV Push once  dextrose 50% Injectable 25 Gram(s) IV Push once  enoxaparin Injectable 40 milliGRAM(s) SubCutaneous every 24 hours  glucagon  Injectable 1 milliGRAM(s) IntraMuscular once  insulin lispro (ADMELOG) corrective regimen sliding scale   SubCutaneous every 6 hours  lidocaine   4% Patch 1 Patch Transdermal daily  meropenem  IVPB 1000 milliGRAM(s) IV Intermittent every 12 hours  midazolam Infusion 0.02 mG/kG/Hr (0.84 mL/Hr) IV Continuous <Continuous>  multivitamin 1 Tablet(s) Oral daily  norepinephrine Infusion 0.05 MICROgram(s)/kG/Min (3.92 mL/Hr) IV Continuous <Continuous>  petrolatum Ophthalmic Ointment 1 Application(s) Both EYES two times a day  polyethylene glycol 3350 17 Gram(s) Oral daily  propofol Infusion 30 MICROgram(s)/kG/Min (7.52 mL/Hr) IV Continuous <Continuous>  senna Syrup 10 milliLiter(s) Oral daily    MEDICATIONS  (PRN):  acetaminophen     Tablet .. 650 milliGRAM(s) Oral every 6 hours PRN Temp greater or equal to 38C (100.4F)  ALBUTerol    90 MICROgram(s) HFA Inhaler 2 Puff(s) Inhalation every 4 hours PRN Shortness of Breath and/or Wheezing      ALLERGIES:  Allergies    No Known Allergies    Intolerances        LABS:                        7.7    22.25 )-----------( 175      ( 2022 03:02 )             24.4     Hemoglobin: 7.7 g/dL ( @ 03:02)  Hemoglobin: 7.5 g/dL ( @ 03:30)  Hemoglobin: 8.9 g/dL ( @ 04:03)  Hemoglobin: 8.3 g/dL ( @ 03:11)  Hemoglobin: 8.4 g/dL ( @ 05:28)    CBC Full  -  ( 2022 03:02 )  WBC Count : 22.25 K/uL  RBC Count : 2.73 M/uL  Hemoglobin : 7.7 g/dL  Hematocrit : 24.4 %  Platelet Count - Automated : 175 K/uL  Mean Cell Volume : 89.4 fL  Mean Cell Hemoglobin : 28.2 pg  Mean Cell Hemoglobin Concentration : 31.6 gm/dL  Auto Neutrophil # : x  Auto Lymphocyte # : x  Auto Monocyte # : x  Auto Eosinophil # : x  Auto Basophil # : x  Auto Neutrophil % : x  Auto Lymphocyte % : x  Auto Monocyte % : x  Auto Eosinophil % : x  Auto Basophil % : x        145  |  108<H>  |  27<H>  ----------------------------<  180<H>  4.0   |  27  |  0.49<L>    Ca    9.0      2022 03:02  Phos  2.6     -  Mg     2.10         TPro  5.4<L>  /  Alb  2.3<L>  /  TBili  0.4  /  DBili  x   /  AST  63<H>  /  ALT  96<H>  /  AlkPhos  92      Creatinine Trend: 0.49<--, 0.61<--, 0.67<--, 0.69<--, 0.67<--, 0.60<--  LIVER FUNCTIONS - ( 2022 03:02 )  Alb: 2.3 g/dL / Pro: 5.4 g/dL / ALK PHOS: 92 U/L / ALT: 96 U/L / AST: 63 U/L / GGT: x           PT/INR - ( 2022 03:30 )   PT: 11.2 sec;   INR: 0.97 ratio             hs Troponin:    ABG - ( 2022 03:02 )  pH, Arterial: 7.51  pH, Blood: x     /  pCO2: 40    /  pO2: 113   / HCO3: 32    / Base Excess: 8.2   /  SaO2: 97.0                03:02 - ABG - pH: 7.51  |  pCO2: 40    |  pO2: 113   | Lactate:       | BE: 8.2        Urinalysis Basic - ( 2022 17:19 )    Color: Light Yellow / Appearance: Clear / S.014 / pH: x  Gluc: x / Ketone: Negative  / Bili: Negative / Urobili: <2 mg/dL   Blood: x / Protein: Trace / Nitrite: Negative   Leuk Esterase: Negative / RBC: 5-10 /HPF / WBC 0-2 /HPF   Sq Epi: x / Non Sq Epi: small /HPF / Bacteria: Few      CSF:                      EKG:   MICROBIOLOGY:    Culture - Sputum (collected 2022 00:43)  Source: .Sputum Sputum  Gram Stain (2022 02:51):    Few polymorphonuclear leukocytes per low power field    Few Squamous epithelial cells per low power field    Few Yeast like cells seen per oil power field    Few Gram Negative Rods seen per oil power field  Preliminary Report (2022 18:50):    Normal Respiratory Lydia present    Culture - Blood (collected 2022 18:24)  Source: .Blood Blood  Preliminary Report (2022 19:02):    No growth to date.    Culture - Blood (collected 2022 18:23)  Source: .Blood Blood  Preliminary Report (2022 19:02):    No growth to date.      IMAGING:      Labs, imaging, EKG personally reviewed    RADIOLOGY & ADDITIONAL TESTS: Reviewed.     INTERVAL HPI/OVERNIGHT EVENTS: No acute events overnight. Free water decreased    SUBJECTIVE: Patient seen and examined at bedside. Attempt to wean versed was made however patient became tachypneic and required fent to resedate the patient.     OBJECTIVE:    VITAL SIGNS:  ICU Vital Signs Last 24 Hrs  T(C): 38.8 (2022 04:00), Max: 38.8 (2022 04:00)  T(F): 101.8 (2022 04:00), Max: 101.8 (2022 04:00)  HR: 90 (2022 07:00) (73 - 95)  BP: --  BP(mean): --  ABP: 94/53 (2022 07:00) (86/51 - 120/66)  ABP(mean): 65 (2022 07:00) (62 - 83)  RR: 25 (2022 07:00) (22 - 28)  SpO2: 92% (2022 07:00) (91% - 97%)    Mode: AC/ CMV (Assist Control/ Continuous Mandatory Ventilation), RR (machine): 18, TV (machine): 280, FiO2: 50, PEEP: 5, ITime: 0.56, MAP: 13, PIP: 39    01-04 @ 07:01  -  01-05 @ 07:00  --------------------------------------------------------  IN: 3986.7 mL / OUT: 2105 mL / NET: 1881.7 mL      CAPILLARY BLOOD GLUCOSE      POCT Blood Glucose.: 169 mg/dL (2022 05:16)      PHYSICAL EXAM:  General: sedated, intubated, contractures  HEENT: NC/AT; PERRL, clear conjunctiva  Neck: supple, crackles palpated   Respiratory: Mechanical breath sounds, lungs clear to auscultation  Cardiovascular: +S1/S2; RRR  Abdomen: soft, NT/ND; +BS x4  Extremities: WWP, 2+ peripheral pulses b/l; no LE edema, notable muscle wasting in lower extremities  Skin: normal color and turgor; no rash  Neurological: unable to assess      MEDICATIONS:  MEDICATIONS  (STANDING):  chlorhexidine 0.12% Liquid 15 milliLiter(s) Oral Mucosa every 12 hours  chlorhexidine 4% Liquid 1 Application(s) Topical <User Schedule>  dexMEDEtomidine Infusion 0.1 MICROgram(s)/kG/Hr (1.05 mL/Hr) IV Continuous <Continuous>  dextrose 40% Gel 15 Gram(s) Oral once  dextrose 5%. 1000 milliLiter(s) (50 mL/Hr) IV Continuous <Continuous>  dextrose 5%. 1000 milliLiter(s) (100 mL/Hr) IV Continuous <Continuous>  dextrose 50% Injectable 25 Gram(s) IV Push once  dextrose 50% Injectable 12.5 Gram(s) IV Push once  dextrose 50% Injectable 25 Gram(s) IV Push once  enoxaparin Injectable 40 milliGRAM(s) SubCutaneous every 24 hours  glucagon  Injectable 1 milliGRAM(s) IntraMuscular once  insulin lispro (ADMELOG) corrective regimen sliding scale   SubCutaneous every 6 hours  lidocaine   4% Patch 1 Patch Transdermal daily  meropenem  IVPB 1000 milliGRAM(s) IV Intermittent every 12 hours  midazolam Infusion 0.02 mG/kG/Hr (0.84 mL/Hr) IV Continuous <Continuous>  multivitamin 1 Tablet(s) Oral daily  norepinephrine Infusion 0.05 MICROgram(s)/kG/Min (3.92 mL/Hr) IV Continuous <Continuous>  petrolatum Ophthalmic Ointment 1 Application(s) Both EYES two times a day  polyethylene glycol 3350 17 Gram(s) Oral daily  propofol Infusion 30 MICROgram(s)/kG/Min (7.52 mL/Hr) IV Continuous <Continuous>  senna Syrup 10 milliLiter(s) Oral daily    MEDICATIONS  (PRN):  acetaminophen     Tablet .. 650 milliGRAM(s) Oral every 6 hours PRN Temp greater or equal to 38C (100.4F)  ALBUTerol    90 MICROgram(s) HFA Inhaler 2 Puff(s) Inhalation every 4 hours PRN Shortness of Breath and/or Wheezing      ALLERGIES:  Allergies    No Known Allergies    Intolerances        LABS:                        7.7    22.25 )-----------( 175      ( 2022 03:02 )             24.4     Hemoglobin: 7.7 g/dL ( @ 03:02)  Hemoglobin: 7.5 g/dL ( @ 03:30)  Hemoglobin: 8.9 g/dL ( @ 04:03)  Hemoglobin: 8.3 g/dL ( @ 03:11)  Hemoglobin: 8.4 g/dL ( @ 05:28)    CBC Full  -  ( 2022 03:02 )  WBC Count : 22.25 K/uL  RBC Count : 2.73 M/uL  Hemoglobin : 7.7 g/dL  Hematocrit : 24.4 %  Platelet Count - Automated : 175 K/uL  Mean Cell Volume : 89.4 fL  Mean Cell Hemoglobin : 28.2 pg  Mean Cell Hemoglobin Concentration : 31.6 gm/dL  Auto Neutrophil # : x  Auto Lymphocyte # : x  Auto Monocyte # : x  Auto Eosinophil # : x  Auto Basophil # : x  Auto Neutrophil % : x  Auto Lymphocyte % : x  Auto Monocyte % : x  Auto Eosinophil % : x  Auto Basophil % : x        145  |  108<H>  |  27<H>  ----------------------------<  180<H>  4.0   |  27  |  0.49<L>    Ca    9.0      2022 03:02  Phos  2.6       Mg     2.10         TPro  5.4<L>  /  Alb  2.3<L>  /  TBili  0.4  /  DBili  x   /  AST  63<H>  /  ALT  96<H>  /  AlkPhos  92      Creatinine Trend: 0.49<--, 0.61<--, 0.67<--, 0.69<--, 0.67<--, 0.60<--  LIVER FUNCTIONS - ( 2022 03:02 )  Alb: 2.3 g/dL / Pro: 5.4 g/dL / ALK PHOS: 92 U/L / ALT: 96 U/L / AST: 63 U/L / GGT: x           PT/INR - ( 2022 03:30 )   PT: 11.2 sec;   INR: 0.97 ratio             hs Troponin:    ABG - ( 2022 03:02 )  pH, Arterial: 7.51  pH, Blood: x     /  pCO2: 40    /  pO2: 113   / HCO3: 32    / Base Excess: 8.2   /  SaO2: 97.0                03:02 - ABG - pH: 7.51  |  pCO2: 40    |  pO2: 113   | Lactate:       | BE: 8.2        Urinalysis Basic - ( 2022 17:19 )    Color: Light Yellow / Appearance: Clear / S.014 / pH: x  Gluc: x / Ketone: Negative  / Bili: Negative / Urobili: <2 mg/dL   Blood: x / Protein: Trace / Nitrite: Negative   Leuk Esterase: Negative / RBC: 5-10 /HPF / WBC 0-2 /HPF   Sq Epi: x / Non Sq Epi: small /HPF / Bacteria: Few      CSF:                      EKG:   MICROBIOLOGY:    Culture - Sputum (collected 2022 00:43)  Source: .Sputum Sputum  Gram Stain (2022 02:51):    Few polymorphonuclear leukocytes per low power field    Few Squamous epithelial cells per low power field    Few Yeast like cells seen per oil power field    Few Gram Negative Rods seen per oil power field  Preliminary Report (2022 18:50):    Normal Respiratory Lydia present    Culture - Blood (collected 2022 18:24)  Source: .Blood Blood  Preliminary Report (2022 19:02):    No growth to date.    Culture - Blood (collected 2022 18:23)  Source: .Blood Blood  Preliminary Report (2022 19:02):    No growth to date.      IMAGING:      Labs, imaging, EKG personally reviewed    RADIOLOGY & ADDITIONAL TESTS: Reviewed.

## 2022-01-05 NOTE — PROGRESS NOTE ADULT - ASSESSMENT
60 y/o F, PMH of Polio (bedbound at baseline w/ chronic contractures), HTN, HLD, Type 2 DM (unknown if on insulin), presented to ED w/ c/o left hip pain and dry cough x 1 week, found to be Covid+ and have L hip dislocation for which ortho was consulted for which it was determined she needed no acute surgical intervention. Course complicated by persistent hypoxia in the setting of multifocal pneumonia 2/2 COVID and/or aspiration.     Neuro:  Baseline nonverbal but responds to commands. Currently intubated and sedated.   -continue with sedation w/ precedex, prop, will trial weaning versed as long as patient comfortable and concordant with vent    Respiratory:   #Acute hypoxic respiratory failure-   -P/F 47 pre-intubation. Severe ARDS.    - s/p intubation on 12/27  - CXR on 1/3 showed subq emphysema, concern for barotrauma from dysynchrony from vent, will hold off on further cpap trials for now   -COVID +, was being treated with dexamethasone and remdesivir. Last day of decadron on 1/3, remdesivir discontinued upon arrival to MICU  -Empirically tx for CAP abx. Previously on azithro and CTX but urine legionella negative and pt febrile so coverage broadened to zosyn on 12/30-1/3, however pt persistently febrile and started on meropenem 1/3 pending cultures  -Previous CT negative for PE, previously on lovenox BID however after discussion with pharmacy normal ppx dose is appropriate  -Low threshold for stat CXR if pressure drops as patient at risk for developing PTX    Cardiac:   -on Levo titrated to MAP 65  #HTN  #Hyperlipidemia-holding statin      GI:  Tube feeds  Rectal pouch in place  -New transaminitis, RUQ showed fatty infiltration, suspect Covid hepatitis, however will continue to trend, now downtrending    /Renal:  #Hypernatremia- increasing to 156, increased free water back to 400q4h.   #T2DM- Pt hypoglycemic to the 40s overnight, humulin discontinued, continue w/ sliding scale. Initial hyperglycemia likely 2/2 decadron    MSK:  #Polio- Chronic contractures. No intervention at this time. Follow-up nutrition recommendations.   #Chronic Hip dislocations- Ortho following. No acute surgical intervention needed. Will ctm.     ID:  - urine legionella neg, Azithro dc'd on 12/29  -CTX broadened to zosyn on 12/30-1/3. However patient still febrile on 1/3, will start meropenem  -blood cx NGTD, repeat cultures pending  -urine cultures growing GBS  -Sputum culture 1/1 NGTD, repeat sent growing yeast and GNR  -UA on 1/3 neg    Prophylaxis:  #Lovenox ppx    GOC:   Appreciate palliative care, pt DNR. BRANDEE signed and in chart   60 y/o F, PMH of Polio (bedbound at baseline w/ chronic contractures), HTN, HLD, Type 2 DM (unknown if on insulin), presented to ED w/ c/o left hip pain and dry cough x 1 week, found to be Covid+ and have L hip dislocation for which ortho was consulted for which it was determined she needed no acute surgical intervention. Course complicated by persistent hypoxia in the setting of multifocal pneumonia 2/2 COVID and/or aspiration.     Neuro:  Baseline nonverbal but responds to commands. Currently intubated and sedated.   -continue with sedation w/ precedex, prop, will trial weaning versed as long as patient comfortable and concordant with vent    Respiratory:   #Acute hypoxic respiratory failure-   -P/F 48 pre-intubation. Severe ARDS.    - s/p intubation on 12/27  - CXR on 1/3 showed subq emphysema, concern for barotrauma from dysynchrony from vent, will hold off on further cpap trials for now   -COVID +, was being treated with dexamethasone and remdesivir. Last day of decadron on 1/3, remdesivir discontinued upon arrival to MICU  -Empirically tx for CAP abx. Previously on azithro and CTX but urine legionella negative and pt febrile so coverage broadened to zosyn on 12/30-1/3, however pt persistently febrile and started on meropenem 1/3 pending cultures  -Previous CT negative for PE, previously on lovenox BID however after discussion with pharmacy normal ppx dose is appropriate  -Low threshold for stat CXR if pressure drops as patient at risk for developing PTX    Cardiac:   -on Levo titrated to MAP 65  #HTN  #Hyperlipidemia-holding statin      GI:  Tube feeds  Rectal pouch in place  -New transaminitis, RUQ showed fatty infiltration, suspect Covid hepatitis, however will continue to trend, now downtrending    /Renal:  #Hypernatremia- decreasing to 145, free water changed to 200q6h   #T2DM- c/w ISS    MSK:  #Polio- Chronic contractures. No intervention at this time. Follow-up nutrition recommendations.   #Chronic Hip dislocations- Ortho following. No acute surgical intervention needed. Will ctm.     ID:  - urine legionella neg, Azithro dc'd on 12/29  -CTX broadened to zosyn on 12/30-1/3. However patient still febrile on 1/3, c/w meropenem started 1/3  -blood cx NGTD, cultures ordered for today  -urine cultures growing GBS  -Sputum culture 1/1 NGTD, repeat sent growing yeast and GNR  -UA on 1/3 neg    Prophylaxis:  #Lovenox ppx    GOC:   Appreciate palliative care, pt DNR. BRANDEE signed and in chart   60 y/o F, PMH of Polio (bedbound at baseline w/ chronic contractures), HTN, HLD, Type 2 DM (unknown if on insulin), presented to ED w/ c/o left hip pain and dry cough x 1 week, found to be Covid+ and have L hip dislocation for which ortho was consulted for which it was determined she needed no acute surgical intervention. Course complicated by persistent hypoxia in the setting of multifocal pneumonia 2/2 COVID and/or aspiration.     Neuro:  Baseline nonverbal but responds to commands. Currently intubated and sedated.   -continue with sedation w/ precedex, prop, will trial weaning versed as long as patient comfortable and concordant with vent    Respiratory:   #Acute hypoxic respiratory failure-   -P/F 48 pre-intubation. Severe ARDS.    - s/p intubation on 12/27  - CXR on 1/3 showed subq emphysema, concern for barotrauma from dysynchrony from vent, will hold off on further cpap trials for now   -COVID +, was being treated with dexamethasone and remdesivir. Last day of decadron on 1/3, remdesivir discontinued upon arrival to MICU  -Empirically tx for CAP abx. Previously on azithro and CTX but urine legionella negative and pt febrile so coverage broadened to zosyn on 12/30-1/3, however pt persistently febrile and started on meropenem 1/3 pending cultures  -Previous CT negative for PE, previously on lovenox BID however after discussion with pharmacy normal ppx dose is appropriate  -Low threshold for stat CXR if pressure drops as patient at risk for developing PTX    Cardiac:   -on Levo titrated to MAP 65  #HTN  #Hyperlipidemia-holding statin      GI:  Tube feeds  Rectal pouch in place  -New transaminitis, RUQ showed fatty infiltration, suspect Covid hepatitis, however will continue to trend, now downtrending    /Renal:  #Hypernatremia- decreasing to 145, free water changed to 200q6h   #T2DM- c/w ISS    MSK:  #Polio- Chronic contractures. No intervention at this time. Follow-up nutrition recommendations.   #Chronic Hip dislocations- Ortho following. No acute surgical intervention needed. Will ctm.     ID:  - urine legionella neg, Azithro dc'd on 12/29  -CTX broadened to zosyn on 12/30-1/3. However patient still febrile on 1/3, c/w meropenem started 1/3  -blood cx NGTD, cultures ordered for today  -urine cultures growing GBS  -Sputum culture 1/1 NGTD, repeat sent growing yeast and GNR  -UA on 1/3 neg  - will continue to monitor on patt until tomorrow, if patient febrile, will look for other sources of infection    Prophylaxis:  #Lovenox ppx    GOC:   Appreciate palliative care, pt DNR. BRANEDE signed and in chart  As patient may not be able to tolerate extubation at this time, possibility of trach, however would require extensive discussion with family

## 2022-01-06 NOTE — PROGRESS NOTE ADULT - ATTENDING COMMENTS
Patient examined and case reviewed in detail on bedside rounds  Critically ill and unstable on vent/pressors with severe COVID ARDS Trach is a consideration  Frequent bedside visits with therapy change today.   I have personally provided 35+ minutes of critical care time concurrently with the resident/fellow; this excludes time spent on separate procedures.

## 2022-01-06 NOTE — PROGRESS NOTE ADULT - ASSESSMENT
62 y/o F, PMH of Polio (bedbound at baseline w/ chronic contractures), HTN, HLD, Type 2 DM (unknown if on insulin), presented to ED w/ c/o left hip pain and dry cough x 1 week, found to be Covid+ and have L hip dislocation for which ortho was consulted for which it was determined she needed no acute surgical intervention. Course complicated by persistent hypoxia in the setting of multifocal pneumonia 2/2 COVID and/or aspiration.     Neuro:  Baseline nonverbal but responds to commands. Currently intubated and sedated.   -continue with sedation w/ precedex, prop, will trial weaning versed as long as patient comfortable and concordant with vent    Respiratory:   #Acute hypoxic respiratory failure-   -P/F 48 pre-intubation. Severe ARDS.    - s/p intubation on 12/27  - CXR on 1/3 showed subq emphysema, concern for barotrauma from dysynchrony from vent, will hold off on further cpap trials for now   -COVID +, was being treated with dexamethasone and remdesivir. Last day of decadron on 1/3, remdesivir discontinued upon arrival to MICU  -Empirically tx for CAP abx. Previously on azithro and CTX but urine legionella negative and pt febrile so coverage broadened to zosyn on 12/30-1/3, however pt persistently febrile and started on meropenem 1/3 pending cultures  -Previous CT negative for PE, previously on lovenox BID however after discussion with pharmacy normal ppx dose is appropriate  -Low threshold for stat CXR if pressure drops as patient at risk for developing PTX    Cardiac:   -on Levo titrated to MAP 65  #HTN  #Hyperlipidemia-holding statin      GI:  Tube feeds  Rectal pouch in place  -New transaminitis, RUQ showed fatty infiltration, suspect Covid hepatitis, however will continue to trend, now downtrending    /Renal:  #Hypernatremia- decreasing to 145, free water changed to 300q6h   #T2DM- c/w ISS    MSK:  #Polio- Chronic contractures. No intervention at this time. Follow-up nutrition recommendations.   #Chronic Hip dislocations- Ortho following. No acute surgical intervention needed. Will ctm.     ID:  - urine legionella neg, Azithro dc'd on 12/29  -CTX broadened to zosyn on 12/30-1/3. However patient still febrile on 1/3, c/w meropenem started 1/3  -blood cx NGTD, cultures ordered for today  -urine cultures growing GBS  -Sputum culture 1/1 NGTD, repeat sent growing yeast and GNR  -UA on 1/3 neg  - will continue to monitor on patt until tomorrow, if patient febrile, will look for other sources of infection    Prophylaxis:  #Lovenox ppx    GOC:   Appreciate palliative care, pt DNR. BRANDEE signed and in chart  As patient may not be able to tolerate extubation at this time, possibility of trach, however would require extensive discussion with family    60 y/o F, PMH of Polio (bedbound at baseline w/ chronic contractures), HTN, HLD, Type 2 DM (unknown if on insulin), presented to ED w/ c/o left hip pain and dry cough x 1 week, found to be Covid+ and have L hip dislocation for which ortho was consulted for which it was determined she needed no acute surgical intervention. Course complicated by persistent hypoxia in the setting of multifocal pneumonia 2/2 COVID and/or aspiration.     Neuro:  Baseline nonverbal but responds to commands. Currently intubated and sedated.   -continue with sedation w/ precedex, prop, versed  -add fentanyl to keep patient synchronous with vent    Respiratory:   #Acute hypoxic respiratory failure-   -P/F 48 pre-intubation. Severe ARDS.    - s/p intubation on 12/27  - CXR on 1/3 showed subq emphysema, concern for barotrauma from dysynchrony from vent, will hold off on further cpap trials for now   -COVID +, was being treated with dexamethasone and remdesivir. Last day of decadron on 1/3, remdesivir discontinued upon arrival to MICU  -Previous CT negative for PE, previously on lovenox BID however after discussion with pharmacy normal ppx dose is appropriate  -Low threshold for stat CXR if pressure drops as patient at risk for developing PTX    Cardiac:   -on Levo titrated to MAP 65, rapidly increasing pressor requirements today, per discussion with family would like to continue with pressors  #HTN  #Hyperlipidemia-holding statin      GI:  Tube feeds  Rectal pouch in place  -New transaminitis, RUQ showed fatty infiltration, suspect Covid hepatitis, however will continue to trend, now downtrending    /Renal:  #Hypernatremia- increased to 150, free water changed to 300q6h   #T2DM- c/w ISS    MSK:  #Polio- Chronic contractures. No intervention at this time. Follow-up nutrition recommendations.   #Chronic Hip dislocations- Ortho following. No acute surgical intervention needed. Will ctm.     ID:  - urine legionella neg, Lg coronel'hiram on 12/29  -CTX broadened to zosyn on 12/30-1/3. However patient still febrile on 1/3, c/w meropenem started 1/3  -blood cx NGTD, repeat cultures collected on 1/5, pending result  -urine cultures growing GBS  -Sputum culture 1/1 NGTD, repeat sent growing yeast and GNR  -UA on 1/3 neg  -Given persistent fever through patt, will need to rule out c diff. Specimen sent and patient empirically treated with PO vanc as she is becoming hemodynamically unstable. Vanc 1/6-     Prophylaxis:  #Lovenox ppx    GOC:   Appreciate palliative care, pt DNR. BRANDEE signed and in chart  Patient unlikely to be extubated as weaning trials have not been successful, per goals of care discussion with family, would NOT like trach for the time being. However currently patient is too unstable for any procedure as she has rapidly escalating pressor rquirements.

## 2022-01-06 NOTE — PROGRESS NOTE ADULT - SUBJECTIVE AND OBJECTIVE BOX
INTERVAL HPI/OVERNIGHT EVENTS:    SUBJECTIVE: Patient seen and examined at bedside.     OBJECTIVE:    VITAL SIGNS:  ICU Vital Signs Last 24 Hrs  T(C): 37.9 (06 Jan 2022 04:00), Max: 38.8 (05 Jan 2022 08:00)  T(F): 100.2 (06 Jan 2022 04:00), Max: 101.8 (05 Jan 2022 08:00)  HR: 132 (06 Jan 2022 07:10) (87 - 133)  BP: 97/55 (05 Jan 2022 20:00) (93/55 - 109/62)  BP(mean): 65 (05 Jan 2022 20:00) (61 - 69)  ABP: 111/65 (06 Jan 2022 06:00) (73/45 - 122/67)  ABP(mean): 81 (06 Jan 2022 06:00) (55 - 86)  RR: 26 (06 Jan 2022 06:00) (21 - 31)  SpO2: 99% (06 Jan 2022 07:10) (85% - 100%)    Mode: AC/ CMV (Assist Control/ Continuous Mandatory Ventilation), RR (machine): 18, TV (machine): 280, FiO2: 40, PEEP: 5, ITime: 0.78, MAP: 16, PIP: 45    01-05 @ 07:01  -  01-06 @ 07:00  --------------------------------------------------------  IN: 2494.7 mL / OUT: 2705 mL / NET: -210.3 mL      CAPILLARY BLOOD GLUCOSE      POCT Blood Glucose.: 234 mg/dL (06 Jan 2022 05:13)      PHYSICAL EXAM:    General: NAD  HEENT: NC/AT; PERRL, clear conjunctiva  Neck: supple  Respiratory: CTA b/l  Cardiovascular: +S1/S2; RRR  Abdomen: soft, NT/ND; +BS x4  Extremities: WWP, 2+ peripheral pulses b/l; no LE edema  Skin: normal color and turgor; no rash  Neurological:    MEDICATIONS:  MEDICATIONS  (STANDING):  ALBUTerol    90 MICROgram(s) HFA Inhaler 2 Puff(s) Inhalation every 12 hours  chlorhexidine 0.12% Liquid 15 milliLiter(s) Oral Mucosa every 12 hours  chlorhexidine 4% Liquid 1 Application(s) Topical <User Schedule>  dexMEDEtomidine Infusion 0.1 MICROgram(s)/kG/Hr (1.05 mL/Hr) IV Continuous <Continuous>  dextrose 40% Gel 15 Gram(s) Oral once  dextrose 5%. 1000 milliLiter(s) (50 mL/Hr) IV Continuous <Continuous>  dextrose 5%. 1000 milliLiter(s) (100 mL/Hr) IV Continuous <Continuous>  dextrose 50% Injectable 25 Gram(s) IV Push once  dextrose 50% Injectable 12.5 Gram(s) IV Push once  dextrose 50% Injectable 25 Gram(s) IV Push once  enoxaparin Injectable 40 milliGRAM(s) SubCutaneous every 24 hours  glucagon  Injectable 1 milliGRAM(s) IntraMuscular once  insulin lispro (ADMELOG) corrective regimen sliding scale   SubCutaneous every 6 hours  lidocaine   4% Patch 1 Patch Transdermal daily  meropenem  IVPB 1000 milliGRAM(s) IV Intermittent every 12 hours  midazolam Infusion 0.02 mG/kG/Hr (0.84 mL/Hr) IV Continuous <Continuous>  midodrine 10 milliGRAM(s) Oral every 8 hours  multivitamin 1 Tablet(s) Oral daily  norepinephrine Infusion 0.05 MICROgram(s)/kG/Min (3.92 mL/Hr) IV Continuous <Continuous>  petrolatum Ophthalmic Ointment 1 Application(s) Both EYES two times a day  propofol Infusion 30 MICROgram(s)/kG/Min (7.52 mL/Hr) IV Continuous <Continuous>    MEDICATIONS  (PRN):  acetaminophen    Suspension .. 625 milliGRAM(s) Oral every 8 hours PRN Temp greater or equal to 38C (100.4F)  ALBUTerol    90 MICROgram(s) HFA Inhaler 2 Puff(s) Inhalation every 4 hours PRN Shortness of Breath and/or Wheezing      ALLERGIES:  Allergies    No Known Allergies    Intolerances        LABS:                        7.6    25.69 )-----------( 221      ( 06 Jan 2022 05:17 )             24.5     Hemoglobin: 7.6 g/dL (01-06 @ 05:17)  Hemoglobin: 7.7 g/dL (01-05 @ 03:02)  Hemoglobin: 7.5 g/dL (01-04 @ 03:30)  Hemoglobin: 8.9 g/dL (01-03 @ 04:03)  Hemoglobin: 8.3 g/dL (01-02 @ 03:11)    CBC Full  -  ( 06 Jan 2022 05:17 )  WBC Count : 25.69 K/uL  RBC Count : 2.74 M/uL  Hemoglobin : 7.6 g/dL  Hematocrit : 24.5 %  Platelet Count - Automated : 221 K/uL  Mean Cell Volume : 89.4 fL  Mean Cell Hemoglobin : 27.7 pg  Mean Cell Hemoglobin Concentration : 31.0 gm/dL  Auto Neutrophil # : x  Auto Lymphocyte # : x  Auto Monocyte # : x  Auto Eosinophil # : x  Auto Basophil # : x  Auto Neutrophil % : x  Auto Lymphocyte % : x  Auto Monocyte % : x  Auto Eosinophil % : x  Auto Basophil % : x    01-06    150<H>  |  113<H>  |  25<H>  ----------------------------<  257<H>  4.3   |  27  |  0.49<L>    Ca    9.3      06 Jan 2022 05:17  Phos  3.2     01-06  Mg     2.20     01-06    TPro  5.8<L>  /  Alb  2.2<L>  /  TBili  0.6  /  DBili  x   /  AST  62<H>  /  ALT  73<H>  /  AlkPhos  124<H>  01-06    Creatinine Trend: 0.49<--, 0.49<--, 0.61<--, 0.67<--, 0.69<--, 0.67<--  LIVER FUNCTIONS - ( 06 Jan 2022 05:17 )  Alb: 2.2 g/dL / Pro: 5.8 g/dL / ALK PHOS: 124 U/L / ALT: 73 U/L / AST: 62 U/L / GGT: x               hs Troponin:    ABG - ( 06 Jan 2022 05:17 )  pH, Arterial: 7.47  pH, Blood: x     /  pCO2: 42    /  pO2: 55    / HCO3: 31    / Base Excess: 6.3   /  SaO2: 87.2                05:17 - ABG - pH: 7.47  |  pCO2: 42    |  pO2: 55    | Lactate:       | BE: 6.3          CSF:                      EKG:   MICROBIOLOGY:    Culture - Sputum (collected 03 Jan 2022 20:00)  Source: .Sputum Sputum  Gram Stain (04 Jan 2022 02:51):    Few polymorphonuclear leukocytes per low power field    Few Squamous epithelial cells per low power field    Few Yeast like cells seen per oil power field    Few Gram Negative Rods seen per oil power field  Final Report (05 Jan 2022 16:48):    Normal Respiratory Lydia present    Culture - Blood (collected 03 Jan 2022 18:24)  Source: .Blood Blood  Preliminary Report (04 Jan 2022 19:02):    No growth to date.    Culture - Blood (collected 03 Jan 2022 18:23)  Source: .Blood Blood  Preliminary Report (04 Jan 2022 19:02):    No growth to date.      IMAGING:      Labs, imaging, EKG personally reviewed    RADIOLOGY & ADDITIONAL TESTS: Reviewed.     INTERVAL HPI/OVERNIGHT EVENTS: Pt febrile overnight, free water increased, unable to wean any sedation, required additional pushes of sedatives to keep concordant with the vent.     SUBJECTIVE: Patient seen and examined at bedside. Intubated and sedated.     OBJECTIVE:    VITAL SIGNS:  ICU Vital Signs Last 24 Hrs  T(C): 37.9 (06 Jan 2022 04:00), Max: 38.8 (05 Jan 2022 08:00)  T(F): 100.2 (06 Jan 2022 04:00), Max: 101.8 (05 Jan 2022 08:00)  HR: 132 (06 Jan 2022 07:10) (87 - 133)  BP: 97/55 (05 Jan 2022 20:00) (93/55 - 109/62)  BP(mean): 65 (05 Jan 2022 20:00) (61 - 69)  ABP: 111/65 (06 Jan 2022 06:00) (73/45 - 122/67)  ABP(mean): 81 (06 Jan 2022 06:00) (55 - 86)  RR: 26 (06 Jan 2022 06:00) (21 - 31)  SpO2: 99% (06 Jan 2022 07:10) (85% - 100%)    Mode: AC/ CMV (Assist Control/ Continuous Mandatory Ventilation), RR (machine): 18, TV (machine): 280, FiO2: 40, PEEP: 5, ITime: 0.78, MAP: 16, PIP: 45    01-05 @ 07:01  -  01-06 @ 07:00  --------------------------------------------------------  IN: 2494.7 mL / OUT: 2705 mL / NET: -210.3 mL      CAPILLARY BLOOD GLUCOSE      POCT Blood Glucose.: 234 mg/dL (06 Jan 2022 05:13)      PHYSICAL EXAM:  General: sedated, intubated, contractures  HEENT: NC/AT; PERRL, clear conjunctiva  Neck: supple, crackles palpated   Respiratory: Mechanical breath sounds, lungs clear to auscultation  Cardiovascular: +S1/S2; RRR  Abdomen: soft, NT/ND; +BS x4  Extremities: WWP, 2+ peripheral pulses b/l; no LE edema, notable muscle wasting in lower extremities  Skin: normal color and turgor; no rash  Neurological: unable to assess      MEDICATIONS:  MEDICATIONS  (STANDING):  ALBUTerol    90 MICROgram(s) HFA Inhaler 2 Puff(s) Inhalation every 12 hours  chlorhexidine 0.12% Liquid 15 milliLiter(s) Oral Mucosa every 12 hours  chlorhexidine 4% Liquid 1 Application(s) Topical <User Schedule>  dexMEDEtomidine Infusion 0.1 MICROgram(s)/kG/Hr (1.05 mL/Hr) IV Continuous <Continuous>  dextrose 40% Gel 15 Gram(s) Oral once  dextrose 5%. 1000 milliLiter(s) (50 mL/Hr) IV Continuous <Continuous>  dextrose 5%. 1000 milliLiter(s) (100 mL/Hr) IV Continuous <Continuous>  dextrose 50% Injectable 25 Gram(s) IV Push once  dextrose 50% Injectable 12.5 Gram(s) IV Push once  dextrose 50% Injectable 25 Gram(s) IV Push once  enoxaparin Injectable 40 milliGRAM(s) SubCutaneous every 24 hours  glucagon  Injectable 1 milliGRAM(s) IntraMuscular once  insulin lispro (ADMELOG) corrective regimen sliding scale   SubCutaneous every 6 hours  lidocaine   4% Patch 1 Patch Transdermal daily  meropenem  IVPB 1000 milliGRAM(s) IV Intermittent every 12 hours  midazolam Infusion 0.02 mG/kG/Hr (0.84 mL/Hr) IV Continuous <Continuous>  midodrine 10 milliGRAM(s) Oral every 8 hours  multivitamin 1 Tablet(s) Oral daily  norepinephrine Infusion 0.05 MICROgram(s)/kG/Min (3.92 mL/Hr) IV Continuous <Continuous>  petrolatum Ophthalmic Ointment 1 Application(s) Both EYES two times a day  propofol Infusion 30 MICROgram(s)/kG/Min (7.52 mL/Hr) IV Continuous <Continuous>    MEDICATIONS  (PRN):  acetaminophen    Suspension .. 625 milliGRAM(s) Oral every 8 hours PRN Temp greater or equal to 38C (100.4F)  ALBUTerol    90 MICROgram(s) HFA Inhaler 2 Puff(s) Inhalation every 4 hours PRN Shortness of Breath and/or Wheezing      ALLERGIES:  Allergies    No Known Allergies    Intolerances        LABS:                        7.6    25.69 )-----------( 221      ( 06 Jan 2022 05:17 )             24.5     Hemoglobin: 7.6 g/dL (01-06 @ 05:17)  Hemoglobin: 7.7 g/dL (01-05 @ 03:02)  Hemoglobin: 7.5 g/dL (01-04 @ 03:30)  Hemoglobin: 8.9 g/dL (01-03 @ 04:03)  Hemoglobin: 8.3 g/dL (01-02 @ 03:11)    CBC Full  -  ( 06 Jan 2022 05:17 )  WBC Count : 25.69 K/uL  RBC Count : 2.74 M/uL  Hemoglobin : 7.6 g/dL  Hematocrit : 24.5 %  Platelet Count - Automated : 221 K/uL  Mean Cell Volume : 89.4 fL  Mean Cell Hemoglobin : 27.7 pg  Mean Cell Hemoglobin Concentration : 31.0 gm/dL  Auto Neutrophil # : x  Auto Lymphocyte # : x  Auto Monocyte # : x  Auto Eosinophil # : x  Auto Basophil # : x  Auto Neutrophil % : x  Auto Lymphocyte % : x  Auto Monocyte % : x  Auto Eosinophil % : x  Auto Basophil % : x    01-06    150<H>  |  113<H>  |  25<H>  ----------------------------<  257<H>  4.3   |  27  |  0.49<L>    Ca    9.3      06 Jan 2022 05:17  Phos  3.2     01-06  Mg     2.20     01-06    TPro  5.8<L>  /  Alb  2.2<L>  /  TBili  0.6  /  DBili  x   /  AST  62<H>  /  ALT  73<H>  /  AlkPhos  124<H>  01-06    Creatinine Trend: 0.49<--, 0.49<--, 0.61<--, 0.67<--, 0.69<--, 0.67<--  LIVER FUNCTIONS - ( 06 Jan 2022 05:17 )  Alb: 2.2 g/dL / Pro: 5.8 g/dL / ALK PHOS: 124 U/L / ALT: 73 U/L / AST: 62 U/L / GGT: x               hs Troponin:    ABG - ( 06 Jan 2022 05:17 )  pH, Arterial: 7.47  pH, Blood: x     /  pCO2: 42    /  pO2: 55    / HCO3: 31    / Base Excess: 6.3   /  SaO2: 87.2                05:17 - ABG - pH: 7.47  |  pCO2: 42    |  pO2: 55    | Lactate:       | BE: 6.3          CSF:                      EKG:   MICROBIOLOGY:    Culture - Sputum (collected 03 Jan 2022 20:00)  Source: .Sputum Sputum  Gram Stain (04 Jan 2022 02:51):    Few polymorphonuclear leukocytes per low power field    Few Squamous epithelial cells per low power field    Few Yeast like cells seen per oil power field    Few Gram Negative Rods seen per oil power field  Final Report (05 Jan 2022 16:48):    Normal Respiratory Lydia present    Culture - Blood (collected 03 Jan 2022 18:24)  Source: .Blood Blood  Preliminary Report (04 Jan 2022 19:02):    No growth to date.    Culture - Blood (collected 03 Jan 2022 18:23)  Source: .Blood Blood  Preliminary Report (04 Jan 2022 19:02):    No growth to date.      IMAGING:      Labs, imaging, EKG personally reviewed    RADIOLOGY & ADDITIONAL TESTS: Reviewed.

## 2022-01-07 NOTE — CHART NOTE - NSCHARTNOTEFT_GEN_A_CORE
Reason for Disposition  • [1] Sore throat with cough/cold symptoms AND [2] present < 5 days   (all triage questions negative)    Protocols used: SORE THROAT-A-    Patient started with cold symptoms on Sunday, 5/3/2020.  Started with a scratchy throat.  Patient is currently having a headache, mild sore throat, mild sinus pressure.  Patient had nasal drainage on Monday, not having any currently. Patient states has swollen gland to one side of neck.  Has an infrequent dry cough. States she is a smoker and it is not uncommon to cough.   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  Symptoms are improved by taking multi symptom cold medication, Day-quil.   Has not had any recent travels or exposure to COVID + patient.  Denies fever, shortness of breath or difficulty breathing.  Patient is off of work currently.  Patient is asking about testing for COVID-19 as it was recommended by patients employer.  Patient states, \"I feel like I have a cold.\"   Please advise.     : Titi Antonio    INDICATION:  Respiratory failure    PROCEDURE:  [ ] LIMITED ECHO  [x ] LIMITED CHEST  [ ] LIMITED RETROPERITONEAL  [ ] LIMITED ABDOMINAL  [ ] LIMITED DVT  [ ] NEEDLE GUIDANCE VASCULAR  [ ] NEEDLE GUIDANCE THORACENTESIS  [ ] NEEDLE GUIDANCE PARACENTESIS  [ ] NEEDLE GUIDANCE PERICARDIOCENTESIS  [ ] OTHER    FINDINGS:    Lungs: Diffuse B-line pattern with lung sliding b/l.        INTERPRETATION:    No PTX     Images uploaded on [employer] : Titi Antonio    INDICATION:  Respiratory failure    PROCEDURE:  [ ] LIMITED ECHO  [x ] LIMITED CHEST  [ ] LIMITED RETROPERITONEAL  [ ] LIMITED ABDOMINAL  [ ] LIMITED DVT  [ ] NEEDLE GUIDANCE VASCULAR  [ ] NEEDLE GUIDANCE THORACENTESIS  [ ] NEEDLE GUIDANCE PARACENTESIS  [ ] NEEDLE GUIDANCE PERICARDIOCENTESIS  [ ] OTHER    FINDINGS:    Lungs: Diffuse B-line pattern with lung sliding b/l.        INTERPRETATION:    No PTX     Images uploaded on [employer]    I was present during the key portions of the procedure and immediately available during the entire procedure.  Suman KLINE

## 2022-01-07 NOTE — PROGRESS NOTE ADULT - ASSESSMENT
62 y/o F, PMH of Polio (bedbound at baseline w/ chronic contractures), HTN, HLD, Type 2 DM (unknown if on insulin), presented to ED w/ c/o left hip pain and dry cough x 1 week, found to be Covid+ and have L hip dislocation for which ortho was consulted for which it was determined she needed no acute surgical intervention. Course complicated by persistent hypoxia in the setting of multifocal pneumonia 2/2 COVID and/or aspiration.     Neuro:  Baseline nonverbal but responds to commands. Currently intubated and sedated.   -continue with sedation w/ precedex, prop, versed  -add fentanyl to keep patient synchronous with vent    Respiratory:   #Acute hypoxic respiratory failure-   -P/F 47 pre-intubation. Severe ARDS.    - s/p intubation on 12/27  - CXR on 1/3 showed subq emphysema, concern for barotrauma from dysynchrony from vent, will hold off on further cpap trials for now   -COVID +, was being treated with dexamethasone and remdesivir. Last day of decadron on 1/3, remdesivir discontinued upon arrival to MICU  -Previous CT negative for PE, previously on lovenox BID however after discussion with pharmacy normal ppx dose is appropriate  -Low threshold for stat CXR if pressure drops as patient at risk for developing PTX    Cardiac:   -on Levo titrated to MAP 65, rapidly increasing pressor requirements today, per discussion with family would like to continue with pressors  #HTN  #Hyperlipidemia-holding statin      GI:  Tube feeds  Rectal pouch in place  -New transaminitis, RUQ showed fatty infiltration, suspect Covid hepatitis, however will continue to trend, now downtrending    /Renal:  #Hypernatremia- increased to 150, free water changed to 300q6h   #T2DM- c/w ISS    MSK:  #Polio- Chronic contractures. No intervention at this time. Follow-up nutrition recommendations.   #Chronic Hip dislocations- Ortho following. No acute surgical intervention needed. Will ctm.     ID:  - urine legionella neg, Lg coronel'hiram on 12/29  -CTX broadened to zosyn on 12/30-1/3. However patient still febrile on 1/3, c/w meropenem started 1/3  -blood cx NGTD, repeat cultures collected on 1/5, pending result  -urine cultures growing GBS  -Sputum culture 1/1 NGTD, repeat sent growing yeast and GNR  -UA on 1/3 neg  -Given persistent fever through patt, will need to rule out c diff. Specimen sent and patient empirically treated with PO vanc as she is becoming hemodynamically unstable. Vanc 1/6-     Prophylaxis:  #Lovenox ppx    GOC:   Appreciate palliative care, pt DNR. BRANDEE signed and in chart  Patient unlikely to be extubated as weaning trials have not been successful, per goals of care discussion with family, would NOT like trach for the time being. However currently patient is too unstable for any procedure as she has rapidly escalating pressor rquirements.  60 y/o F, PMH of Polio (bedbound at baseline w/ chronic contractures), HTN, HLD, Type 2 DM (unknown if on insulin), presented to ED w/ c/o left hip pain and dry cough x 1 week, found to be Covid+ and have L hip dislocation for which ortho was consulted for which it was determined she needed no acute surgical intervention. Course complicated by persistent hypoxia in the setting of multifocal pneumonia 2/2 COVID and/or aspiration, however pt persistently febrile despite broad spectrum antibiotics.     Neuro:  Baseline nonverbal but responds to commands. Currently intubated and sedated.   -continue with sedation w/ precedex, prop, versed, fent      Respiratory:   #Acute hypoxic respiratory failure-   -P/F 47 pre-intubation. Severe ARDS.    - s/p intubation on 12/27  - CXR on 1/3 showed subq emphysema, concern for barotrauma from dysynchrony from vent, will hold off on further cpap trials for now   -COVID +, was being treated with dexamethasone and remdesivir. Last day of decadron on 1/3, remdesivir discontinued upon arrival to MICU  -Previous CT negative for PE, previously on lovenox BID however after discussion with pharmacy normal ppx dose is appropriate  -Low threshold for stat CXR if pressure drops as patient at risk for developing PTX  -Increasing O2 requirements  -Family does not want trach    Cardiac:   -on Levo titrated to MAP 65, rapidly increasing pressor requirements today, per discussion with family would like to continue with pressors  #HTN  #Hyperlipidemia-holding statin      GI:  Tube feeds  Rectal pouch in place  -New transaminitis, RUQ showed fatty infiltration, suspect Covid hepatitis, however will continue to trend, now downtrending    /Renal:  #Hypernatremia- increased to 150, free water changed to 300q6h   #T2DM- c/w ISS    MSK:  #Polio- Chronic contractures. No intervention at this time. Follow-up nutrition recommendations.   #Chronic Hip dislocations- Ortho following. No acute surgical intervention needed. Will ctm.     ID:  - urine legionella neg, Lg dc'd on 12/29  -CTX broadened to zosyn on 12/30-1/3. However patient still febrile on 1/3, c/w meropenem started 1/3  -blood cx NGTD, repeat cultures collected on 1/5, pending result  -urine cultures growing GBS  -Sputum culture 1/1 NGTD, repeat sent growing yeast and GNR  -UA on 1/3 neg  -Given persistent fever through patt, will need to rule out c diff. Specimen sent and patient empirically treated with PO vanc as she is becoming hemodynamically unstable. Vanc 1/6-     Prophylaxis:  #Lovenox ppx    GOC:   Appreciate palliative care, pt DNR. BRANDEE signed and in chart  Patient unlikely to be extubated as weaning trials have not been successful, per goals of care discussion with family, would NOT like trach for the time being. However currently patient is too unstable for any procedure as she has rapidly escalating pressor rquirements.  60 y/o F, PMH of Polio (bedbound at baseline w/ chronic contractures), HTN, HLD, Type 2 DM (unknown if on insulin), presented to ED w/ c/o left hip pain and dry cough x 1 week, found to be Covid+ and have L hip dislocation for which ortho was consulted for which it was determined she needed no acute surgical intervention. Course complicated by persistent hypoxia in the setting of multifocal pneumonia 2/2 COVID and/or aspiration, however pt persistently febrile despite broad spectrum antibiotics.     Neuro:  Baseline nonverbal but responds to commands. Currently intubated and sedated.   -continue with sedation w/ precedex, prop, versed, fent      Respiratory:   #Acute hypoxic respiratory failure-   -P/F 47 pre-intubation. Severe ARDS.    - s/p intubation on 12/27  - CXR on 1/3 showed subq emphysema, concern for barotrauma from dysynchrony from vent, will hold off on further cpap trials for now   -COVID +, was being treated with dexamethasone and remdesivir. Last day of decadron on 1/3, remdesivir discontinued upon arrival to MICU  -Previous CT negative for PE  -Low threshold for stat CXR if pressure drops as patient at risk for developing PTX  -Increasing O2 requirements  -Family does not want trach  -Pt having high peak pressures, PTX ruled out    Cardiac:   -on Levo titrated to MAP 65, rapidly increasing pressor requirements today, per discussion with family would like to continue with pressors  #HTN  #Hyperlipidemia-holding statin      GI:  Tube feeds held for high residuals (approx 750cc)  Rectal pouch in place  -New transaminitis, RUQ showed fatty infiltration, suspect Covid hepatitis, however will continue to trend, now downtrending  -US showed large stool burden, however no indication of toxic megacolon    /Renal:  #Hypernatremia- dropped to 149, free water maintained at 300q6h   #T2DM- c/w ISS    MSK:  #Polio- Chronic contractures. No intervention at this time. Follow-up nutrition recommendations.   #Chronic Hip dislocations- Ortho following. No acute surgical intervention needed. Will ctm.     ID:  - urine legionella neg, Azithro dc'd on 12/29  -CTX broadened to zosyn on 12/30-1/3. However patient still febrile on 1/3, c/w meropenem started 1/3  -blood cx NGTD, repeat cultures collected on 1/5, pending result  -urine cultures growing GBS  -Sputum culture 1/1 NGTD, repeat sent growing yeast and GNR  -UA on 1/3 neg  -Given persistent fever through patt, will need to rule out c diff. Specimen sent and patient empirically treated with PO vanc as she is becoming hemodynamically unstable. Vanc 1/6- , given poor GI function added IV Flagyl on 1/7 -     Prophylaxis:  #Lovenox ppx    GOC:   Appreciate palliative care, pt DNR. BRANDEE signed and in chart  Patient unlikely to be extubated as weaning trials have not been successful, per goals of care discussion with family, would NOT like trach for the time being. However currently patient is too unstable for any procedure as she has rapidly escalating pressor rquirements. Family to come to bedside today to discuss further goals of care

## 2022-01-07 NOTE — CHART NOTE - NSCHARTNOTEFT_GEN_A_CORE
61F w/ polio and COVID19 intubated on 12/27, clinical course complicated by persistent fevers despite broad spectrum abx coverage. Family at bedside today for goals of care. Primary decision maker is Elizabeth Fatima (brother) who is present with his sister.     Discussed that patient has been on maximum therapy, including high dose pressors, broad spectrum antibiotics, and ventilatory support without improvement in clinical condition. Expressed concern that patient is overall declining and that escalating care including additional pressors will not change her outcome. Family understands that the patient is declining, agree with capping pressors and not escalating further medical treatments or interventions. They do not want to withdraw any care that she is currently receiving but do not want to add anything either. They understand that patient may pass. Given this, pressors capped: levo @ 0.47. Goals of care to be continued      Adry Aguilera MD, PGY-3  Internal Medicine  Pager: [phone]

## 2022-01-07 NOTE — PROGRESS NOTE ADULT - SUBJECTIVE AND OBJECTIVE BOX
INTERVAL HPI/OVERNIGHT EVENTS: Persistently febrile, empiric treatment for c diff started    SUBJECTIVE: Patient seen and examined at bedside.     OBJECTIVE:    VITAL SIGNS:  ICU Vital Signs Last 24 Hrs  T(C): 37.3 (07 Jan 2022 04:00), Max: 39.3 (06 Jan 2022 16:00)  T(F): 99.1 (07 Jan 2022 04:00), Max: 102.8 (06 Jan 2022 16:00)  HR: 125 (07 Jan 2022 07:23) (117 - 140)  BP: --  BP(mean): --  ABP: 101/84 (07 Jan 2022 06:00) (92/50 - 129/72)  ABP(mean): 88 (07 Jan 2022 06:00) (62 - 91)  RR: 20 (07 Jan 2022 06:00) (16 - 32)  SpO2: 100% (07 Jan 2022 07:23) (89% - 100%)    Mode: AC/ CMV (Assist Control/ Continuous Mandatory Ventilation), RR (machine): 18, TV (machine): 280, FiO2: 80, PEEP: 5, ITime: 0.5, MAP: 10, PIP: 40    01-06 @ 07:01  -  01-07 @ 07:00  --------------------------------------------------------  IN: 4234.2 mL / OUT: 1960 mL / NET: 2274.2 mL      CAPILLARY BLOOD GLUCOSE      POCT Blood Glucose.: 206 mg/dL (07 Jan 2022 05:34)      PHYSICAL EXAM:    General: NAD  HEENT: NC/AT; PERRL, clear conjunctiva  Neck: supple  Respiratory: CTA b/l  Cardiovascular: +S1/S2; RRR  Abdomen: soft, NT/ND; +BS x4  Extremities: WWP, 2+ peripheral pulses b/l; no LE edema  Skin: normal color and turgor; no rash  Neurological:    MEDICATIONS:  MEDICATIONS  (STANDING):  ALBUTerol    90 MICROgram(s) HFA Inhaler 2 Puff(s) Inhalation every 12 hours  chlorhexidine 0.12% Liquid 15 milliLiter(s) Oral Mucosa every 12 hours  chlorhexidine 4% Liquid 1 Application(s) Topical <User Schedule>  dexMEDEtomidine Infusion 0.1 MICROgram(s)/kG/Hr (1.05 mL/Hr) IV Continuous <Continuous>  dextrose 40% Gel 15 Gram(s) Oral once  dextrose 5%. 1000 milliLiter(s) (100 mL/Hr) IV Continuous <Continuous>  dextrose 5%. 1000 milliLiter(s) (50 mL/Hr) IV Continuous <Continuous>  dextrose 50% Injectable 25 Gram(s) IV Push once  dextrose 50% Injectable 12.5 Gram(s) IV Push once  dextrose 50% Injectable 25 Gram(s) IV Push once  enoxaparin Injectable 40 milliGRAM(s) SubCutaneous every 24 hours  fentaNYL   Infusion. 0.5 MICROgram(s)/kG/Hr (2.09 mL/Hr) IV Continuous <Continuous>  glucagon  Injectable 1 milliGRAM(s) IntraMuscular once  insulin lispro (ADMELOG) corrective regimen sliding scale   SubCutaneous every 6 hours  lidocaine   4% Patch 1 Patch Transdermal daily  meropenem  IVPB 1000 milliGRAM(s) IV Intermittent every 12 hours  midazolam Infusion 0.02 mG/kG/Hr (0.84 mL/Hr) IV Continuous <Continuous>  midodrine 10 milliGRAM(s) Oral every 8 hours  multivitamin 1 Tablet(s) Oral daily  norepinephrine Infusion 0.05 MICROgram(s)/kG/Min (3.92 mL/Hr) IV Continuous <Continuous>  petrolatum Ophthalmic Ointment 1 Application(s) Both EYES two times a day  propofol Infusion 30 MICROgram(s)/kG/Min (7.52 mL/Hr) IV Continuous <Continuous>  vancomycin    Solution 125 milliGRAM(s) Oral every 6 hours    MEDICATIONS  (PRN):  acetaminophen    Suspension .. 625 milliGRAM(s) Oral every 8 hours PRN Temp greater or equal to 38C (100.4F)  ALBUTerol    90 MICROgram(s) HFA Inhaler 2 Puff(s) Inhalation every 4 hours PRN Shortness of Breath and/or Wheezing      ALLERGIES:  Allergies    No Known Allergies    Intolerances        LABS:                        7.2    28.77 )-----------( 250      ( 07 Jan 2022 02:07 )             23.5     Hemoglobin: 7.2 g/dL (01-07 @ 02:07)  Hemoglobin: 7.2 g/dL (01-06 @ 16:12)  Hemoglobin: 7.6 g/dL (01-06 @ 05:17)  Hemoglobin: 7.7 g/dL (01-05 @ 03:02)  Hemoglobin: 7.5 g/dL (01-04 @ 03:30)    CBC Full  -  ( 07 Jan 2022 02:07 )  WBC Count : 28.77 K/uL  RBC Count : 2.51 M/uL  Hemoglobin : 7.2 g/dL  Hematocrit : 23.5 %  Platelet Count - Automated : 250 K/uL  Mean Cell Volume : 93.6 fL  Mean Cell Hemoglobin : 28.7 pg  Mean Cell Hemoglobin Concentration : 30.6 gm/dL  Auto Neutrophil # : x  Auto Lymphocyte # : x  Auto Monocyte # : x  Auto Eosinophil # : x  Auto Basophil # : x  Auto Neutrophil % : x  Auto Lymphocyte % : x  Auto Monocyte % : x  Auto Eosinophil % : x  Auto Basophil % : x    01-07    149<H>  |  113<H>  |  24<H>  ----------------------------<  217<H>  4.6   |  29  |  0.60    Ca    9.2      07 Jan 2022 02:07  Phos  4.2     01-07  Mg     2.20     01-07    TPro  5.8<L>  /  Alb  2.1<L>  /  TBili  0.4  /  DBili  x   /  AST  74<H>  /  ALT  69<H>  /  AlkPhos  127<H>  01-07    Creatinine Trend: 0.60<--, 0.57<--, 0.49<--, 0.49<--, 0.61<--, 0.67<--  LIVER FUNCTIONS - ( 07 Jan 2022 02:07 )  Alb: 2.1 g/dL / Pro: 5.8 g/dL / ALK PHOS: 127 U/L / ALT: 69 U/L / AST: 74 U/L / GGT: x               hs Troponin:    ABG - ( 06 Jan 2022 16:12 )  pH, Arterial: 7.36  pH, Blood: x     /  pCO2: 54    /  pO2: 192   / HCO3: 30    / Base Excess: 4.4   /  SaO2: 97.4                16:12 - ABG - pH: 7.36  |  pCO2: 54    |  pO2: 192   | Lactate:       | BE: 4.4          CSF:                      EKG:   MICROBIOLOGY:    Culture - Blood (collected 05 Jan 2022 22:54)  Source: .Blood Blood-Venous  Preliminary Report (06 Jan 2022 23:01):    No growth to date.    Culture - Blood (collected 05 Jan 2022 22:54)  Source: .Blood Blood-Peripheral  Preliminary Report (06 Jan 2022 23:01):    No growth to date.    Culture - Nose (collected 05 Jan 2022 18:49)  Source: .Nose  Preliminary Report (06 Jan 2022 22:28):    No Staphylococcus aureus isolated to date      IMAGING:      Labs, imaging, EKG personally reviewed    RADIOLOGY & ADDITIONAL TESTS: Reviewed.     INTERVAL HPI/OVERNIGHT EVENTS: Persistently febrile, empiric treatment for c diff started    SUBJECTIVE: Patient seen and examined at bedside. Abdomen was more distended, large volume residuals present    OBJECTIVE:    VITAL SIGNS:  ICU Vital Signs Last 24 Hrs  T(C): 37.3 (07 Jan 2022 04:00), Max: 39.3 (06 Jan 2022 16:00)  T(F): 99.1 (07 Jan 2022 04:00), Max: 102.8 (06 Jan 2022 16:00)  HR: 125 (07 Jan 2022 07:23) (117 - 140)  BP: --  BP(mean): --  ABP: 101/84 (07 Jan 2022 06:00) (92/50 - 129/72)  ABP(mean): 88 (07 Jan 2022 06:00) (62 - 91)  RR: 20 (07 Jan 2022 06:00) (16 - 32)  SpO2: 100% (07 Jan 2022 07:23) (89% - 100%)    Mode: AC/ CMV (Assist Control/ Continuous Mandatory Ventilation), RR (machine): 18, TV (machine): 280, FiO2: 80, PEEP: 5, ITime: 0.5, MAP: 10, PIP: 40    01-06 @ 07:01  -  01-07 @ 07:00  --------------------------------------------------------  IN: 4234.2 mL / OUT: 1960 mL / NET: 2274.2 mL      CAPILLARY BLOOD GLUCOSE      POCT Blood Glucose.: 206 mg/dL (07 Jan 2022 05:34)      PHYSICAL EXAM:  General: sedated, intubated, contractures  HEENT: NC/AT; PERRL, clear conjunctiva  Neck: supple  Respiratory: Mechanical breath sounds, lungs clear to auscultation  Cardiovascular: +S1/S2; RRR  Abdomen: soft, NT; distention noted, high volume residuals   Extremities: WWP, 2+ peripheral pulses b/l; no LE edema, notable muscle wasting in lower extremities  Skin: normal color and turgor; no rash  Neurological: unable to assess    MEDICATIONS:  MEDICATIONS  (STANDING):  ALBUTerol    90 MICROgram(s) HFA Inhaler 2 Puff(s) Inhalation every 12 hours  chlorhexidine 0.12% Liquid 15 milliLiter(s) Oral Mucosa every 12 hours  chlorhexidine 4% Liquid 1 Application(s) Topical <User Schedule>  dexMEDEtomidine Infusion 0.1 MICROgram(s)/kG/Hr (1.05 mL/Hr) IV Continuous <Continuous>  dextrose 40% Gel 15 Gram(s) Oral once  dextrose 5%. 1000 milliLiter(s) (100 mL/Hr) IV Continuous <Continuous>  dextrose 5%. 1000 milliLiter(s) (50 mL/Hr) IV Continuous <Continuous>  dextrose 50% Injectable 25 Gram(s) IV Push once  dextrose 50% Injectable 12.5 Gram(s) IV Push once  dextrose 50% Injectable 25 Gram(s) IV Push once  enoxaparin Injectable 40 milliGRAM(s) SubCutaneous every 24 hours  fentaNYL   Infusion. 0.5 MICROgram(s)/kG/Hr (2.09 mL/Hr) IV Continuous <Continuous>  glucagon  Injectable 1 milliGRAM(s) IntraMuscular once  insulin lispro (ADMELOG) corrective regimen sliding scale   SubCutaneous every 6 hours  lidocaine   4% Patch 1 Patch Transdermal daily  meropenem  IVPB 1000 milliGRAM(s) IV Intermittent every 12 hours  midazolam Infusion 0.02 mG/kG/Hr (0.84 mL/Hr) IV Continuous <Continuous>  midodrine 10 milliGRAM(s) Oral every 8 hours  multivitamin 1 Tablet(s) Oral daily  norepinephrine Infusion 0.05 MICROgram(s)/kG/Min (3.92 mL/Hr) IV Continuous <Continuous>  petrolatum Ophthalmic Ointment 1 Application(s) Both EYES two times a day  propofol Infusion 30 MICROgram(s)/kG/Min (7.52 mL/Hr) IV Continuous <Continuous>  vancomycin    Solution 125 milliGRAM(s) Oral every 6 hours    MEDICATIONS  (PRN):  acetaminophen    Suspension .. 625 milliGRAM(s) Oral every 8 hours PRN Temp greater or equal to 38C (100.4F)  ALBUTerol    90 MICROgram(s) HFA Inhaler 2 Puff(s) Inhalation every 4 hours PRN Shortness of Breath and/or Wheezing      ALLERGIES:  Allergies    No Known Allergies    Intolerances        LABS:                        7.2    28.77 )-----------( 250      ( 07 Jan 2022 02:07 )             23.5     Hemoglobin: 7.2 g/dL (01-07 @ 02:07)  Hemoglobin: 7.2 g/dL (01-06 @ 16:12)  Hemoglobin: 7.6 g/dL (01-06 @ 05:17)  Hemoglobin: 7.7 g/dL (01-05 @ 03:02)  Hemoglobin: 7.5 g/dL (01-04 @ 03:30)    CBC Full  -  ( 07 Jan 2022 02:07 )  WBC Count : 28.77 K/uL  RBC Count : 2.51 M/uL  Hemoglobin : 7.2 g/dL  Hematocrit : 23.5 %  Platelet Count - Automated : 250 K/uL  Mean Cell Volume : 93.6 fL  Mean Cell Hemoglobin : 28.7 pg  Mean Cell Hemoglobin Concentration : 30.6 gm/dL  Auto Neutrophil # : x  Auto Lymphocyte # : x  Auto Monocyte # : x  Auto Eosinophil # : x  Auto Basophil # : x  Auto Neutrophil % : x  Auto Lymphocyte % : x  Auto Monocyte % : x  Auto Eosinophil % : x  Auto Basophil % : x    01-07    149<H>  |  113<H>  |  24<H>  ----------------------------<  217<H>  4.6   |  29  |  0.60    Ca    9.2      07 Jan 2022 02:07  Phos  4.2     01-07  Mg     2.20     01-07    TPro  5.8<L>  /  Alb  2.1<L>  /  TBili  0.4  /  DBili  x   /  AST  74<H>  /  ALT  69<H>  /  AlkPhos  127<H>  01-07    Creatinine Trend: 0.60<--, 0.57<--, 0.49<--, 0.49<--, 0.61<--, 0.67<--  LIVER FUNCTIONS - ( 07 Jan 2022 02:07 )  Alb: 2.1 g/dL / Pro: 5.8 g/dL / ALK PHOS: 127 U/L / ALT: 69 U/L / AST: 74 U/L / GGT: x               hs Troponin:    ABG - ( 06 Jan 2022 16:12 )  pH, Arterial: 7.36  pH, Blood: x     /  pCO2: 54    /  pO2: 192   / HCO3: 30    / Base Excess: 4.4   /  SaO2: 97.4                16:12 - ABG - pH: 7.36  |  pCO2: 54    |  pO2: 192   | Lactate:       | BE: 4.4          CSF:    EKG:   MICROBIOLOGY:    Culture - Blood (collected 05 Jan 2022 22:54)  Source: .Blood Blood-Venous  Preliminary Report (06 Jan 2022 23:01):    No growth to date.    Culture - Blood (collected 05 Jan 2022 22:54)  Source: .Blood Blood-Peripheral  Preliminary Report (06 Jan 2022 23:01):    No growth to date.    Culture - Nose (collected 05 Jan 2022 18:49)  Source: .Nose  Preliminary Report (06 Jan 2022 22:28):    No Staphylococcus aureus isolated to date      IMAGING:      Labs, imaging, EKG personally reviewed    RADIOLOGY & ADDITIONAL TESTS: Reviewed.     INTERVAL HPI/OVERNIGHT EVENTS: Persistently febrile, empiric treatment for c diff started    SUBJECTIVE: Patient seen and examined at bedside. Abdomen was more distended, large volume residuals present. High peaking to the 50s, PTX ruled out. Overall patient is declining, family to come visit to further discuss goals of care.     OBJECTIVE:    VITAL SIGNS:  ICU Vital Signs Last 24 Hrs  T(C): 37.3 (07 Jan 2022 04:00), Max: 39.3 (06 Jan 2022 16:00)  T(F): 99.1 (07 Jan 2022 04:00), Max: 102.8 (06 Jan 2022 16:00)  HR: 125 (07 Jan 2022 07:23) (117 - 140)  BP: --  BP(mean): --  ABP: 101/84 (07 Jan 2022 06:00) (92/50 - 129/72)  ABP(mean): 88 (07 Jan 2022 06:00) (62 - 91)  RR: 20 (07 Jan 2022 06:00) (16 - 32)  SpO2: 100% (07 Jan 2022 07:23) (89% - 100%)    Mode: AC/ CMV (Assist Control/ Continuous Mandatory Ventilation), RR (machine): 18, TV (machine): 280, FiO2: 80, PEEP: 5, ITime: 0.5, MAP: 10, PIP: 40    01-06 @ 07:01  -  01-07 @ 07:00  --------------------------------------------------------  IN: 4234.2 mL / OUT: 1960 mL / NET: 2274.2 mL      CAPILLARY BLOOD GLUCOSE      POCT Blood Glucose.: 206 mg/dL (07 Jan 2022 05:34)      PHYSICAL EXAM:  General: sedated, intubated, contractures  HEENT: NC/AT; PERRL, clear conjunctiva  Neck: supple  Respiratory: Mechanical breath sounds, lungs clear to auscultation  Cardiovascular: +S1/S2; RRR  Abdomen: soft, NT; distention noted, high volume residuals   Extremities: WWP, 2+ peripheral pulses b/l; no LE edema, notable muscle wasting in lower extremities  Skin: normal color and turgor; no rash  Neurological: unable to assess    MEDICATIONS:  MEDICATIONS  (STANDING):  ALBUTerol    90 MICROgram(s) HFA Inhaler 2 Puff(s) Inhalation every 12 hours  chlorhexidine 0.12% Liquid 15 milliLiter(s) Oral Mucosa every 12 hours  chlorhexidine 4% Liquid 1 Application(s) Topical <User Schedule>  dexMEDEtomidine Infusion 0.1 MICROgram(s)/kG/Hr (1.05 mL/Hr) IV Continuous <Continuous>  dextrose 40% Gel 15 Gram(s) Oral once  dextrose 5%. 1000 milliLiter(s) (100 mL/Hr) IV Continuous <Continuous>  dextrose 5%. 1000 milliLiter(s) (50 mL/Hr) IV Continuous <Continuous>  dextrose 50% Injectable 25 Gram(s) IV Push once  dextrose 50% Injectable 12.5 Gram(s) IV Push once  dextrose 50% Injectable 25 Gram(s) IV Push once  enoxaparin Injectable 40 milliGRAM(s) SubCutaneous every 24 hours  fentaNYL   Infusion. 0.5 MICROgram(s)/kG/Hr (2.09 mL/Hr) IV Continuous <Continuous>  glucagon  Injectable 1 milliGRAM(s) IntraMuscular once  insulin lispro (ADMELOG) corrective regimen sliding scale   SubCutaneous every 6 hours  lidocaine   4% Patch 1 Patch Transdermal daily  meropenem  IVPB 1000 milliGRAM(s) IV Intermittent every 12 hours  midazolam Infusion 0.02 mG/kG/Hr (0.84 mL/Hr) IV Continuous <Continuous>  midodrine 10 milliGRAM(s) Oral every 8 hours  multivitamin 1 Tablet(s) Oral daily  norepinephrine Infusion 0.05 MICROgram(s)/kG/Min (3.92 mL/Hr) IV Continuous <Continuous>  petrolatum Ophthalmic Ointment 1 Application(s) Both EYES two times a day  propofol Infusion 30 MICROgram(s)/kG/Min (7.52 mL/Hr) IV Continuous <Continuous>  vancomycin    Solution 125 milliGRAM(s) Oral every 6 hours    MEDICATIONS  (PRN):  acetaminophen    Suspension .. 625 milliGRAM(s) Oral every 8 hours PRN Temp greater or equal to 38C (100.4F)  ALBUTerol    90 MICROgram(s) HFA Inhaler 2 Puff(s) Inhalation every 4 hours PRN Shortness of Breath and/or Wheezing      ALLERGIES:  Allergies    No Known Allergies    Intolerances        LABS:                        7.2    28.77 )-----------( 250      ( 07 Jan 2022 02:07 )             23.5     Hemoglobin: 7.2 g/dL (01-07 @ 02:07)  Hemoglobin: 7.2 g/dL (01-06 @ 16:12)  Hemoglobin: 7.6 g/dL (01-06 @ 05:17)  Hemoglobin: 7.7 g/dL (01-05 @ 03:02)  Hemoglobin: 7.5 g/dL (01-04 @ 03:30)    CBC Full  -  ( 07 Jan 2022 02:07 )  WBC Count : 28.77 K/uL  RBC Count : 2.51 M/uL  Hemoglobin : 7.2 g/dL  Hematocrit : 23.5 %  Platelet Count - Automated : 250 K/uL  Mean Cell Volume : 93.6 fL  Mean Cell Hemoglobin : 28.7 pg  Mean Cell Hemoglobin Concentration : 30.6 gm/dL  Auto Neutrophil # : x  Auto Lymphocyte # : x  Auto Monocyte # : x  Auto Eosinophil # : x  Auto Basophil # : x  Auto Neutrophil % : x  Auto Lymphocyte % : x  Auto Monocyte % : x  Auto Eosinophil % : x  Auto Basophil % : x    01-07    149<H>  |  113<H>  |  24<H>  ----------------------------<  217<H>  4.6   |  29  |  0.60    Ca    9.2      07 Jan 2022 02:07  Phos  4.2     01-07  Mg     2.20     01-07    TPro  5.8<L>  /  Alb  2.1<L>  /  TBili  0.4  /  DBili  x   /  AST  74<H>  /  ALT  69<H>  /  AlkPhos  127<H>  01-07    Creatinine Trend: 0.60<--, 0.57<--, 0.49<--, 0.49<--, 0.61<--, 0.67<--  LIVER FUNCTIONS - ( 07 Jan 2022 02:07 )  Alb: 2.1 g/dL / Pro: 5.8 g/dL / ALK PHOS: 127 U/L / ALT: 69 U/L / AST: 74 U/L / GGT: x               hs Troponin:    ABG - ( 06 Jan 2022 16:12 )  pH, Arterial: 7.36  pH, Blood: x     /  pCO2: 54    /  pO2: 192   / HCO3: 30    / Base Excess: 4.4   /  SaO2: 97.4                16:12 - ABG - pH: 7.36  |  pCO2: 54    |  pO2: 192   | Lactate:       | BE: 4.4          CSF:    EKG:   MICROBIOLOGY:    Culture - Blood (collected 05 Jan 2022 22:54)  Source: .Blood Blood-Venous  Preliminary Report (06 Jan 2022 23:01):    No growth to date.    Culture - Blood (collected 05 Jan 2022 22:54)  Source: .Blood Blood-Peripheral  Preliminary Report (06 Jan 2022 23:01):    No growth to date.    Culture - Nose (collected 05 Jan 2022 18:49)  Source: .Nose  Preliminary Report (06 Jan 2022 22:28):    No Staphylococcus aureus isolated to date      IMAGING:      Labs, imaging, EKG personally reviewed    RADIOLOGY & ADDITIONAL TESTS: Reviewed.     INTERVAL HPI/OVERNIGHT EVENTS: Persistently febrile, empiric treatment for c diff started    SUBJECTIVE: Patient seen and examined at bedside. Abdomen was more distended, large volume residuals present. High peaking to the 50s, PTX ruled out. Overall patient is declining, family to come visit to further discuss goals of care.     OBJECTIVE:    VITAL SIGNS:  ICU Vital Signs Last 24 Hrs  T(C): 37.3 (07 Jan 2022 04:00), Max: 39.3 (06 Jan 2022 16:00)  T(F): 99.1 (07 Jan 2022 04:00), Max: 102.8 (06 Jan 2022 16:00)  HR: 125 (07 Jan 2022 07:23) (117 - 140)  BP: --  BP(mean): --  ABP: 101/84 (07 Jan 2022 06:00) (92/50 - 129/72)  ABP(mean): 88 (07 Jan 2022 06:00) (62 - 91)  RR: 20 (07 Jan 2022 06:00) (16 - 32)  SpO2: 100% (07 Jan 2022 07:23) (89% - 100%)    Mode: AC/ CMV (Assist Control/ Continuous Mandatory Ventilation), RR (machine): 18, TV (machine): 280, FiO2: 80, PEEP: 5, ITime: 0.5, MAP: 10, PIP: 40    01-06 @ 07:01  -  01-07 @ 07:00  --------------------------------------------------------  IN: 4234.2 mL / OUT: 1960 mL / NET: 2274.2 mL      CAPILLARY BLOOD GLUCOSE      POCT Blood Glucose.: 206 mg/dL (07 Jan 2022 05:34)      PHYSICAL EXAM:  General: sedated, intubated, contractures  HEENT: NC/AT; PERRL, clear conjunctiva  Neck: supple  Respiratory: Mechanical breath sounds, lungs clear to auscultation, high peak pressures  Cardiovascular: +S1/S2; RRR  Abdomen: soft, NT; distention noted, high volume residuals   Extremities: WWP, 2+ peripheral pulses b/l; no LE edema, notable muscle wasting in lower extremities  Skin: normal color and turgor; no rash  Neurological: unable to assess    MEDICATIONS:  MEDICATIONS  (STANDING):  ALBUTerol    90 MICROgram(s) HFA Inhaler 2 Puff(s) Inhalation every 12 hours  chlorhexidine 0.12% Liquid 15 milliLiter(s) Oral Mucosa every 12 hours  chlorhexidine 4% Liquid 1 Application(s) Topical <User Schedule>  dexMEDEtomidine Infusion 0.1 MICROgram(s)/kG/Hr (1.05 mL/Hr) IV Continuous <Continuous>  dextrose 40% Gel 15 Gram(s) Oral once  dextrose 5%. 1000 milliLiter(s) (100 mL/Hr) IV Continuous <Continuous>  dextrose 5%. 1000 milliLiter(s) (50 mL/Hr) IV Continuous <Continuous>  dextrose 50% Injectable 25 Gram(s) IV Push once  dextrose 50% Injectable 12.5 Gram(s) IV Push once  dextrose 50% Injectable 25 Gram(s) IV Push once  enoxaparin Injectable 40 milliGRAM(s) SubCutaneous every 24 hours  fentaNYL   Infusion. 0.5 MICROgram(s)/kG/Hr (2.09 mL/Hr) IV Continuous <Continuous>  glucagon  Injectable 1 milliGRAM(s) IntraMuscular once  insulin lispro (ADMELOG) corrective regimen sliding scale   SubCutaneous every 6 hours  lidocaine   4% Patch 1 Patch Transdermal daily  meropenem  IVPB 1000 milliGRAM(s) IV Intermittent every 12 hours  midazolam Infusion 0.02 mG/kG/Hr (0.84 mL/Hr) IV Continuous <Continuous>  midodrine 10 milliGRAM(s) Oral every 8 hours  multivitamin 1 Tablet(s) Oral daily  norepinephrine Infusion 0.05 MICROgram(s)/kG/Min (3.92 mL/Hr) IV Continuous <Continuous>  petrolatum Ophthalmic Ointment 1 Application(s) Both EYES two times a day  propofol Infusion 30 MICROgram(s)/kG/Min (7.52 mL/Hr) IV Continuous <Continuous>  vancomycin    Solution 125 milliGRAM(s) Oral every 6 hours    MEDICATIONS  (PRN):  acetaminophen    Suspension .. 625 milliGRAM(s) Oral every 8 hours PRN Temp greater or equal to 38C (100.4F)  ALBUTerol    90 MICROgram(s) HFA Inhaler 2 Puff(s) Inhalation every 4 hours PRN Shortness of Breath and/or Wheezing      ALLERGIES:  Allergies    No Known Allergies    Intolerances        LABS:                        7.2    28.77 )-----------( 250      ( 07 Jan 2022 02:07 )             23.5     Hemoglobin: 7.2 g/dL (01-07 @ 02:07)  Hemoglobin: 7.2 g/dL (01-06 @ 16:12)  Hemoglobin: 7.6 g/dL (01-06 @ 05:17)  Hemoglobin: 7.7 g/dL (01-05 @ 03:02)  Hemoglobin: 7.5 g/dL (01-04 @ 03:30)    CBC Full  -  ( 07 Jan 2022 02:07 )  WBC Count : 28.77 K/uL  RBC Count : 2.51 M/uL  Hemoglobin : 7.2 g/dL  Hematocrit : 23.5 %  Platelet Count - Automated : 250 K/uL  Mean Cell Volume : 93.6 fL  Mean Cell Hemoglobin : 28.7 pg  Mean Cell Hemoglobin Concentration : 30.6 gm/dL  Auto Neutrophil # : x  Auto Lymphocyte # : x  Auto Monocyte # : x  Auto Eosinophil # : x  Auto Basophil # : x  Auto Neutrophil % : x  Auto Lymphocyte % : x  Auto Monocyte % : x  Auto Eosinophil % : x  Auto Basophil % : x    01-07    149<H>  |  113<H>  |  24<H>  ----------------------------<  217<H>  4.6   |  29  |  0.60    Ca    9.2      07 Jan 2022 02:07  Phos  4.2     01-07  Mg     2.20     01-07    TPro  5.8<L>  /  Alb  2.1<L>  /  TBili  0.4  /  DBili  x   /  AST  74<H>  /  ALT  69<H>  /  AlkPhos  127<H>  01-07    Creatinine Trend: 0.60<--, 0.57<--, 0.49<--, 0.49<--, 0.61<--, 0.67<--  LIVER FUNCTIONS - ( 07 Jan 2022 02:07 )  Alb: 2.1 g/dL / Pro: 5.8 g/dL / ALK PHOS: 127 U/L / ALT: 69 U/L / AST: 74 U/L / GGT: x               hs Troponin:    ABG - ( 06 Jan 2022 16:12 )  pH, Arterial: 7.36  pH, Blood: x     /  pCO2: 54    /  pO2: 192   / HCO3: 30    / Base Excess: 4.4   /  SaO2: 97.4                16:12 - ABG - pH: 7.36  |  pCO2: 54    |  pO2: 192   | Lactate:       | BE: 4.4          CSF:    EKG:   MICROBIOLOGY:    Culture - Blood (collected 05 Jan 2022 22:54)  Source: .Blood Blood-Venous  Preliminary Report (06 Jan 2022 23:01):    No growth to date.    Culture - Blood (collected 05 Jan 2022 22:54)  Source: .Blood Blood-Peripheral  Preliminary Report (06 Jan 2022 23:01):    No growth to date.    Culture - Nose (collected 05 Jan 2022 18:49)  Source: .Nose  Preliminary Report (06 Jan 2022 22:28):    No Staphylococcus aureus isolated to date      IMAGING:      Labs, imaging, EKG personally reviewed    RADIOLOGY & ADDITIONAL TESTS: Reviewed.

## 2022-01-08 NOTE — CHART NOTE - NSCHARTNOTESELECT_GEN_ALL_CORE
Goals of Care/Event Note
pocus
Accept/Transfer Note
Event Note
Palliative Care/Event Note
pocus
pocus

## 2022-01-08 NOTE — PROGRESS NOTE ADULT - ASSESSMENT
62 y/o F, PMH of Polio (bedbound at baseline w/ chronic contractures), HTN, HLD, Type 2 DM (unknown if on insulin), presented to ED w/ c/o left hip pain and dry cough x 1 week, found to be Covid+ and have L hip dislocation for which ortho was consulted for which it was determined she needed no acute surgical intervention. Course complicated by persistent hypoxia in the setting of multifocal pneumonia 2/2 COVID and/or aspiration, however pt persistently febrile despite broad spectrum antibiotics.     Neuro:  Baseline nonverbal but responds to commands. Currently intubated and sedated.   -continue with sedation w/ precedex, prop, versed, fent    Respiratory:   #Acute hypoxic respiratory failure-   -P/F 47 pre-intubation. Severe ARDS.    - s/p intubation on 12/27  - CXR on 1/3 showed subq emphysema, concern for barotrauma from dysynchrony from vent, will hold off on further cpap trials for now   -COVID +, was being treated with dexamethasone and remdesivir. Last day of decadron on 1/3, remdesivir discontinued upon arrival to MICU  -Previous CT negative for PE  -Low threshold for stat CXR if pressure drops as patient at risk for developing PTX  -Increasing O2 requirements  -Family does not want trach  -Pt having high peak pressures, PTX ruled out    Cardiac:   -on Levo titrated to MAP 65, rapidly increasing pressor requirements today, per discussion with family would like to continue with pressors  #HTN  #Hyperlipidemia-holding statin      GI:  Tube feeds held for high residuals (approx 750cc)  Rectal pouch in place  -New transaminitis, RUQ showed fatty infiltration, suspect Covid hepatitis, however will continue to trend, now downtrending  -US showed large stool burden, however no indication of toxic megacolon    /Renal:  #Hypernatremia- dropped to 149, free water maintained at 300q6h   #T2DM- c/w ISS    MSK:  #Polio- Chronic contractures. No intervention at this time. Follow-up nutrition recommendations.   #Chronic Hip dislocations- Ortho following. No acute surgical intervention needed. Will ctm.     ID:  - urine legionella neg, Azithro dc'd on 12/29  -CTX broadened to zosyn on 12/30-1/3. However patient still febrile on 1/3, c/w meropenem started 1/3  -blood cx NGTD, repeat cultures collected on 1/5, pending result  -urine cultures growing GBS  -Sputum culture 1/1 NGTD, repeat sent growing yeast and GNR  -UA on 1/3 neg  -Given persistent fever through patt, will need to rule out c diff. Specimen sent and patient empirically treated with PO vanc as she is becoming hemodynamically unstable. Vanc 1/6- , given poor GI function added IV Flagyl on 1/7 -     Prophylaxis:  #Lovenox ppx    GOC:   Appreciate palliative care, pt DNR. BRANDEE signed and in chart  Patient unlikely to be extubated as weaning trials have not been successful, per goals of care discussion with family, would NOT like trach for the time being. However currently patient is too unstable for any procedure as she has rapidly escalating pressor rquirements. Family to come to bedside today to discuss further goals of care 60 y/o F, PMH of Polio (bedbound at baseline w/ chronic contractures), HTN, HLD, Type 2 DM (unknown if on insulin), presented to ED w/ c/o left hip pain and dry cough x 1 week, found to be Covid+ and have L hip dislocation for which ortho was consulted for which it was determined she needed no acute surgical intervention. Course complicated by persistent hypoxia in the setting of multifocal pneumonia 2/2 COVID and/or aspiration, however pt persistently febrile despite broad spectrum antibiotics.     Neuro:  Baseline nonverbal but responds to commands. Currently intubated and sedated.   -continue with sedation w/ precedex, prop, versed, fent    Respiratory:   #Acute hypoxic respiratory failure-   -P/F 47 pre-intubation. Severe ARDS.    - s/p intubation on 12/27  - CXR on 1/3 showed subq emphysema, concern for barotrauma from dysynchrony from vent, will hold off on further cpap trials for now   -COVID +, was being treated with dexamethasone and remdesivir. Last day of decadron on 1/3, remdesivir discontinued upon arrival to MICU  -Previous CT negative for PE  -Low threshold for stat CXR if pressure drops as patient at risk for developing PTX  -Increasing O2 requirements   -Family does not want trach  -Pt having high peak pressures, PTX ruled out    Cardiac:   -on Levo gtt, pressors capped after GOC discussion with family   #HTN  #Hyperlipidemia-holding statin      GI:  Tube feeds held for high residuals (approx 750cc)  Rectal pouch in place  -New transaminitis, RUQ showed fatty infiltration, suspect Covid hepatitis, however will continue to trend, now downtrending  -US showed large stool burden, however no indication of toxic megacolon    /Renal:  #Hypernatremia- dropped to 148, free water maintained at 350q6h   #T2DM- c/w ISS    MSK:  #Polio- Chronic contractures. No intervention at this time. Follow-up nutrition recommendations.   #Chronic Hip dislocations- Ortho following. No acute surgical intervention needed. Will ctm.     ID:  - urine legionella neg, Azithro dc'd on 12/29  -CTX broadened to zosyn on 12/30-1/3. However patient still febrile on 1/3, c/w meropenem started 1/3  -blood cx NGTD, repeat cultures collected on 1/5, NGTD   -urine cultures growing GBS  -Sputum culture 1/1 NGTD, repeat sent growing yeast and GNR  -UA on 1/3 neg  - C/w patt  - D/c vanc/flagyl (1/8) given negative C. diff     Prophylaxis:  #Lovenox ppx    GOC:   Appreciate palliative care, pt DNR. BRANDEE signed and in chart  Patient unlikely to be extubated as weaning trials have not been successful, per goals of care discussion with family, would NOT like trach for the time being. Pressors capped, will continue to discuss GOC with family  62 y/o F, PMH of Polio (bedbound at baseline w/ chronic contractures), HTN, HLD, Type 2 DM (unknown if on insulin), presented to ED w/ c/o left hip pain and dry cough x 1 week, found to be Covid+ and have L hip dislocation for which ortho was consulted for which it was determined she needed no acute surgical intervention. Course complicated by persistent hypoxia in the setting of multifocal pneumonia 2/2 COVID and/or aspiration, however pt persistently febrile despite broad spectrum antibiotics.     Neuro:  Baseline nonverbal but responds to commands. Currently intubated and sedated.   -continue with sedation w/ precedex, prop, versed, fent    Respiratory:   #Acute hypoxic respiratory failure-   -P/F 47 pre-intubation. Severe ARDS.    - s/p intubation on 12/27  - CXR on 1/3 showed subq emphysema, concern for barotrauma from dysynchrony from vent, will hold off on further cpap trials for now   -COVID +, was being treated with dexamethasone and remdesivir. Last day of decadron on 1/3, remdesivir discontinued upon arrival to MICU  -Previous CT negative for PE  -Low threshold for stat CXR if pressure drops as patient at risk for developing PTX  -Increasing O2 requirements   -Family does not want trach  -Pt having high peak pressures, PTX ruled out    Cardiac:   -on Levo gtt, pressors capped after GOC discussion with family   #HTN  #Hyperlipidemia-holding statin      GI:  Tube feeds held for high residuals (approx 750cc)  Rectal pouch in place  -New transaminitis, RUQ showed fatty infiltration, suspect Covid hepatitis, however will continue to trend, now downtrending  -US showed large stool burden, however no indication of toxic megacolon    /Renal:  #Hypernatremia- dropped to 148, free water maintained at 350q6h   #T2DM- c/w ISS    MSK:  #Polio- Chronic contractures. No intervention at this time. Follow-up nutrition recommendations.   #Chronic Hip dislocations- Ortho following. No acute surgical intervention needed. Will ctm.     ID:  - urine legionella neg, Azithro dc'd on 12/29  -CTX broadened to zosyn on 12/30-1/3. However patient still febrile on 1/3, c/w meropenem started 1/3  -blood cx NGTD, repeat cultures collected on 1/5, NGTD   -urine cultures growing GBS  -Sputum culture 1/1 NGTD, repeat sent growing yeast and GNR  -UA on 1/3 neg  - C/w patt  - D/c vanc/flagyl (1/8) given negative C. diff     Prophylaxis:  #Lovenox ppx  #Anemia with Hb 6.5 today, no active evidence of bleeding  will hold off on transfusion given no escalation in care     GOC:   Appreciate palliative care, pt DNR. BRANDEE signed and in chart  Patient unlikely to be extubated as weaning trials have not been successful, per goals of care discussion with family, would NOT like trach for the time being. Pressors capped, will continue to discuss GOC with family

## 2022-01-08 NOTE — PROGRESS NOTE ADULT - PROVIDER SPECIALTY LIST ADULT
Anesthesia
MICU
MICU
Palliative Care
MICU
Hospitalist
Hospitalist

## 2022-01-08 NOTE — CHART NOTE - NSCHARTNOTEFT_GEN_A_CORE
I was notified by the nursing staff that the patient was PEA on the monitor. The patient is DNR. Upon my assessment of the patient, she had no palpable pulses, heart and lung sounds were absent, there was no spontaneous respiratory activity on the ventilator, arterial line was flat and could not detect a blood pressure. The patient also had no verbal or painful stimuli, and the pupils were fixed and dilated. The patient was pronounced dead at 18:14 on 1/8/2022. Attending Dr. Salcedo made aware. Will notify the family.

## 2022-01-08 NOTE — PROGRESS NOTE ADULT - SUBJECTIVE AND OBJECTIVE BOX
MD MARK Rincon [facility]  Pager: [phone] / MARK [phone]     INTERVAL HPI/OVERNIGHT EVENTS: Vanc and flagyl discontinued     SUBJECTIVE: Patient seen and examined at bedside. Abdomen was more distended, large volume residuals present. High peaking to the 50s, PTX ruled out. Overall patient is declining, family to come visit to further discuss goals of care.     OBJECTIVE:    VITAL SIGNS:  ICU Vital Signs Last 24 Hrs  T(C): 37.2 (08 Jan 2022 04:00), Max: 37.8 (08 Jan 2022 00:00)  T(F): 99 (08 Jan 2022 04:00), Max: 100 (08 Jan 2022 00:00)  HR: 115 (08 Jan 2022 06:36) (106 - 127)  BP: 99/53 (07 Jan 2022 22:00) (91/53 - 115/66)  BP(mean): 64 (07 Jan 2022 22:00) (62 - 77)  ABP: 89/46 (08 Jan 2022 06:00) (89/46 - 140/75)  ABP(mean): 60 (08 Jan 2022 06:00) (47 - 99)  RR: 18 (08 Jan 2022 06:00) (14 - 27)  SpO2: 100% (08 Jan 2022 06:36) (88% - 100%)    Mode: AC/ CMV (Assist Control/ Continuous Mandatory Ventilation), RR (machine): 18, TV (machine): 280, FiO2: 80, PEEP: 5, ITime: 0.7, MAP: 14, PIP: 58    01-06 @ 07:01 - 01-07 @ 07:00  --------------------------------------------------------  IN: 4234.2 mL / OUT: 2035 mL / NET: 2199.2 mL    01-07 @ 07:01 - 01-08 @ 06:55  --------------------------------------------------------  IN: 3490.6 mL / OUT: 1975 mL / NET: 1515.6 mL      CAPILLARY BLOOD GLUCOSE      POCT Blood Glucose.: 136 mg/dL (08 Jan 2022 05:36)      PHYSICAL EXAM:    General: sedated, intubated, contractures  HEENT: NC/AT; PERRL, clear conjunctiva  Neck: supple  Respiratory: Mechanical breath sounds, lungs clear to auscultation, high peak pressures  Cardiovascular: +S1/S2; RRR  Abdomen: soft, NT; distention noted, high volume residuals   Extremities: WWP, 2+ peripheral pulses b/l; no LE edema, notable muscle wasting in lower extremities  Skin: normal color and turgor; no rash  Neurological: unable to assess    MEDICATIONS:  MEDICATIONS  (STANDING):  ALBUTerol    90 MICROgram(s) HFA Inhaler 2 Puff(s) Inhalation every 12 hours  chlorhexidine 0.12% Liquid 15 milliLiter(s) Oral Mucosa every 12 hours  chlorhexidine 4% Liquid 1 Application(s) Topical <User Schedule>  dexMEDEtomidine Infusion 0.1 MICROgram(s)/kG/Hr (1.05 mL/Hr) IV Continuous <Continuous>  dextrose 40% Gel 15 Gram(s) Oral once  dextrose 5%. 1000 milliLiter(s) (50 mL/Hr) IV Continuous <Continuous>  dextrose 5%. 1000 milliLiter(s) (100 mL/Hr) IV Continuous <Continuous>  dextrose 50% Injectable 25 Gram(s) IV Push once  dextrose 50% Injectable 12.5 Gram(s) IV Push once  dextrose 50% Injectable 25 Gram(s) IV Push once  enoxaparin Injectable 40 milliGRAM(s) SubCutaneous every 24 hours  fentaNYL   Infusion. 0.5 MICROgram(s)/kG/Hr (2.09 mL/Hr) IV Continuous <Continuous>  glucagon  Injectable 1 milliGRAM(s) IntraMuscular once  insulin lispro (ADMELOG) corrective regimen sliding scale   SubCutaneous every 6 hours  lidocaine   4% Patch 1 Patch Transdermal daily  meropenem  IVPB 1000 milliGRAM(s) IV Intermittent every 12 hours  midazolam Infusion 0.02 mG/kG/Hr (0.84 mL/Hr) IV Continuous <Continuous>  midodrine 10 milliGRAM(s) Oral every 8 hours  multivitamin 1 Tablet(s) Oral daily  norepinephrine Infusion 0.05 MICROgram(s)/kG/Min (3.92 mL/Hr) IV Continuous <Continuous>  petrolatum Ophthalmic Ointment 1 Application(s) Both EYES two times a day  propofol Infusion 30 MICROgram(s)/kG/Min (7.52 mL/Hr) IV Continuous <Continuous>    MEDICATIONS  (PRN):  acetaminophen    Suspension .. 625 milliGRAM(s) Oral every 8 hours PRN Temp greater or equal to 38C (100.4F)  ALBUTerol    90 MICROgram(s) HFA Inhaler 2 Puff(s) Inhalation every 4 hours PRN Shortness of Breath and/or Wheezing      ALLERGIES:  Allergies    No Known Allergies    Intolerances        LABS:                        7.2    28.77 )-----------( 250      ( 07 Jan 2022 02:07 )             23.5     01-07    149<H>  |  113<H>  |  24<H>  ----------------------------<  217<H>  4.6   |  29  |  0.60    Ca    9.2      07 Jan 2022 02:07  Phos  4.2     01-07  Mg     2.20     01-07    TPro  5.8<L>  /  Alb  2.1<L>  /  TBili  0.4  /  DBili  x   /  AST  74<H>  /  ALT  69<H>  /  AlkPhos  127<H>  01-07          RADIOLOGY & ADDITIONAL TESTS: Reviewed. MD MARK Rincon [facility]  Pager: [phone] / MARK [phone]     INTERVAL HPI/OVERNIGHT EVENTS: Vanc and flagyl discontinued given negative C. diff PCR.     SUBJECTIVE: Patient seen and examined at bedside. High peaking to the 40s-50s, PTX ruled out. Overall patient is declining.     OBJECTIVE:    VITAL SIGNS:  ICU Vital Signs Last 24 Hrs  T(C): 37.2 (08 Jan 2022 04:00), Max: 37.8 (08 Jan 2022 00:00)  T(F): 99 (08 Jan 2022 04:00), Max: 100 (08 Jan 2022 00:00)  HR: 115 (08 Jan 2022 06:36) (106 - 127)  BP: 99/53 (07 Jan 2022 22:00) (91/53 - 115/66)  BP(mean): 64 (07 Jan 2022 22:00) (62 - 77)  ABP: 89/46 (08 Jan 2022 06:00) (89/46 - 140/75)  ABP(mean): 60 (08 Jan 2022 06:00) (47 - 99)  RR: 18 (08 Jan 2022 06:00) (14 - 27)  SpO2: 100% (08 Jan 2022 06:36) (88% - 100%)    Mode: AC/ CMV (Assist Control/ Continuous Mandatory Ventilation), RR (machine): 18, TV (machine): 280, FiO2: 80, PEEP: 5, ITime: 0.7, MAP: 14, PIP: 58    01-06 @ 07:01 - 01-07 @ 07:00  --------------------------------------------------------  IN: 4234.2 mL / OUT: 2035 mL / NET: 2199.2 mL    01-07 @ 07:01 - 01-08 @ 06:55  --------------------------------------------------------  IN: 3490.6 mL / OUT: 1975 mL / NET: 1515.6 mL      CAPILLARY BLOOD GLUCOSE      POCT Blood Glucose.: 136 mg/dL (08 Jan 2022 05:36)      PHYSICAL EXAM:    General: sedated, intubated, contractures  HEENT: NC/AT; PERRL, clear conjunctiva  Neck: supple  Respiratory: Mechanical breath sounds, lungs clear to auscultation, high peak pressures  Cardiovascular: +S1/S2; RRR  Abdomen: soft, NT; distention noted, high volume residuals   Extremities: WWP, 2+ peripheral pulses b/l; no LE edema, notable muscle wasting in lower extremities  Skin: normal color and turgor; no rash  Neurological: unable to assess    MEDICATIONS:  MEDICATIONS  (STANDING):  ALBUTerol    90 MICROgram(s) HFA Inhaler 2 Puff(s) Inhalation every 12 hours  chlorhexidine 0.12% Liquid 15 milliLiter(s) Oral Mucosa every 12 hours  chlorhexidine 4% Liquid 1 Application(s) Topical <User Schedule>  dexMEDEtomidine Infusion 0.1 MICROgram(s)/kG/Hr (1.05 mL/Hr) IV Continuous <Continuous>  dextrose 40% Gel 15 Gram(s) Oral once  dextrose 5%. 1000 milliLiter(s) (50 mL/Hr) IV Continuous <Continuous>  dextrose 5%. 1000 milliLiter(s) (100 mL/Hr) IV Continuous <Continuous>  dextrose 50% Injectable 25 Gram(s) IV Push once  dextrose 50% Injectable 12.5 Gram(s) IV Push once  dextrose 50% Injectable 25 Gram(s) IV Push once  enoxaparin Injectable 40 milliGRAM(s) SubCutaneous every 24 hours  fentaNYL   Infusion. 0.5 MICROgram(s)/kG/Hr (2.09 mL/Hr) IV Continuous <Continuous>  glucagon  Injectable 1 milliGRAM(s) IntraMuscular once  insulin lispro (ADMELOG) corrective regimen sliding scale   SubCutaneous every 6 hours  lidocaine   4% Patch 1 Patch Transdermal daily  meropenem  IVPB 1000 milliGRAM(s) IV Intermittent every 12 hours  midazolam Infusion 0.02 mG/kG/Hr (0.84 mL/Hr) IV Continuous <Continuous>  midodrine 10 milliGRAM(s) Oral every 8 hours  multivitamin 1 Tablet(s) Oral daily  norepinephrine Infusion 0.05 MICROgram(s)/kG/Min (3.92 mL/Hr) IV Continuous <Continuous>  petrolatum Ophthalmic Ointment 1 Application(s) Both EYES two times a day  propofol Infusion 30 MICROgram(s)/kG/Min (7.52 mL/Hr) IV Continuous <Continuous>    MEDICATIONS  (PRN):  acetaminophen    Suspension .. 625 milliGRAM(s) Oral every 8 hours PRN Temp greater or equal to 38C (100.4F)  ALBUTerol    90 MICROgram(s) HFA Inhaler 2 Puff(s) Inhalation every 4 hours PRN Shortness of Breath and/or Wheezing      ALLERGIES:  Allergies    No Known Allergies    Intolerances        LABS:                        7.2    28.77 )-----------( 250      ( 07 Jan 2022 02:07 )             23.5     01-07    149<H>  |  113<H>  |  24<H>  ----------------------------<  217<H>  4.6   |  29  |  0.60    Ca    9.2      07 Jan 2022 02:07  Phos  4.2     01-07  Mg     2.20     01-07    TPro  5.8<L>  /  Alb  2.1<L>  /  TBili  0.4  /  DBili  x   /  AST  74<H>  /  ALT  69<H>  /  AlkPhos  127<H>  01-07          RADIOLOGY & ADDITIONAL TESTS: Reviewed.

## 2022-01-08 NOTE — PROGRESS NOTE ADULT - ATTENDING COMMENTS
61-year-old female with significant past medical history of previous polio who is bedbound with restrictive lung disease comes in for Covid pneumonia.  Patient has had a prolonged course in the MICU intubated sedated and has not responded well to treatment.  Patient continues to have high plateau and peak pressures which are not amenable to changes in the ventilator.  Patient was treated for superimposed pneumonia with antibiotics.  Hemoglobin today is 6.5 after repeat with no signs of active bleeding.  Slight worsening of renal function.  Discussion with family has indicated she is now DNR and will cap pressors.  Overall prognosis is grim.  We will continue to have ongoing discussions with the family.

## 2022-01-10 LAB
CULTURE RESULTS: SIGNIFICANT CHANGE UP
CULTURE RESULTS: SIGNIFICANT CHANGE UP
SPECIMEN SOURCE: SIGNIFICANT CHANGE UP
SPECIMEN SOURCE: SIGNIFICANT CHANGE UP
